# Patient Record
Sex: FEMALE | Race: WHITE | NOT HISPANIC OR LATINO | Employment: FULL TIME | URBAN - METROPOLITAN AREA
[De-identification: names, ages, dates, MRNs, and addresses within clinical notes are randomized per-mention and may not be internally consistent; named-entity substitution may affect disease eponyms.]

---

## 2017-02-08 ENCOUNTER — ALLSCRIPTS OFFICE VISIT (OUTPATIENT)
Dept: OTHER | Facility: OTHER | Age: 47
End: 2017-02-08

## 2017-02-22 ENCOUNTER — ALLSCRIPTS OFFICE VISIT (OUTPATIENT)
Dept: OTHER | Facility: OTHER | Age: 47
End: 2017-02-22

## 2017-03-08 ENCOUNTER — ALLSCRIPTS OFFICE VISIT (OUTPATIENT)
Dept: OTHER | Facility: OTHER | Age: 47
End: 2017-03-08

## 2017-03-24 ENCOUNTER — TRANSCRIBE ORDERS (OUTPATIENT)
Dept: ADMINISTRATIVE | Facility: HOSPITAL | Age: 47
End: 2017-03-24

## 2017-03-24 DIAGNOSIS — Z12.31 SCREENING MAMMOGRAM FOR HIGH-RISK PATIENT: Primary | ICD-10-CM

## 2017-03-30 ENCOUNTER — HOSPITAL ENCOUNTER (OUTPATIENT)
Dept: RADIOLOGY | Facility: HOSPITAL | Age: 47
Discharge: HOME | End: 2017-03-30
Attending: OBSTETRICS & GYNECOLOGY
Payer: COMMERCIAL

## 2017-03-30 DIAGNOSIS — Z12.31 SCREENING MAMMOGRAM FOR HIGH-RISK PATIENT: ICD-10-CM

## 2017-03-30 PROCEDURE — G0202 SCR MAMMO BI INCL CAD: HCPCS

## 2017-04-10 ENCOUNTER — ALLSCRIPTS OFFICE VISIT (OUTPATIENT)
Dept: OTHER | Facility: OTHER | Age: 47
End: 2017-04-10

## 2017-05-23 ENCOUNTER — ALLSCRIPTS OFFICE VISIT (OUTPATIENT)
Dept: OTHER | Facility: OTHER | Age: 47
End: 2017-05-23

## 2017-06-28 ENCOUNTER — ALLSCRIPTS OFFICE VISIT (OUTPATIENT)
Dept: OTHER | Facility: OTHER | Age: 47
End: 2017-06-28

## 2017-10-05 ENCOUNTER — ALLSCRIPTS OFFICE VISIT (OUTPATIENT)
Dept: OTHER | Facility: OTHER | Age: 47
End: 2017-10-05

## 2017-10-27 NOTE — PROGRESS NOTES
Assessment  1  BMI 26 0-26 9,adult (V85 22) (Z68 26)   2  Obesity (278 00) (E66 9)    Discussion/Summary  The patient was counseled regarding risk factor reductions,-- impressions,-- risks and benefits of treatment options  Chief Complaint  pt present to follow up on medications  af/rma      History of Present Illness  10d vacationa lot of beer, ate a lot toobeen weighing selfgain later noted after vacationon TLCstopped fastin for over 1m prior to the ioakyrfy4d [place] on itto have controlled wt while off for the time not takennot gain but did not lose       Review of Systems    Cardiovascular: no chest pain,-- no palpitations-- and-- no lower extremity edema  Respiratory: no orthopnea-- and-- no shortness of breath during exertion  Psychiatric: no anxiety  Active Problems  1  BMI 26 0-26 9,adult (V85 22) (Z68 26)   2  Hearing loss (389 9) (H91 90)   3  Holosystolic murmur (869 9) (A14 3)   4  Immunization due (V05 9) (Z23)   5  Obesity (278 00) (E66 9)   6  S/P hysterectomy with oophorectomy (V88 01) (Z90 710,Z90 721)   7  Screening breast examination (V76 10) (Z12 31)   8  Screening for cardiovascular, respiratory, and genitourinary diseases   (V81 2,V81 4,V81 6) (Z13 6,Z13 83,Z13 89)   9  Screening for diabetes mellitus (DM) (V77 1) (Z13 1)   10  Well adult on routine health check (V70 0) (Z00 00)    Past Medical History  1  History of Acute maxillary sinusitis (461 0) (J01 00)   2  History of acute pharyngitis (V12 69) (Z87 09)   3  History of acute sinusitis (V12 69) (Z87 09)   4  History of Stye In Right Upper Eyelid (373 11)    Surgical History  1  History of Appendectomy   2  History of Hysteroscopy With Endometrial Ablation   3  History of Tubal Ligation    Family History  Mother    1  Family history of Breast CA   2  Family history of Motor neuron disease  Father    3  Family history of Cancer of neck  Family History    4   Family history of [place]'s disease    The family history was reviewed and updated today  Social History   · Former smoker (P47 98) (F30 775)  The social history was reviewed and updated today  Current Meds   1  Aspirin 81 MG Oral Tablet Chewable; CHEW AND SWALLOW 1 TABLET DAILY Recorded   2  Calcium Citrate + TABS; 1 tablet daily; Therapy: (Alex Stanley) to Recorded   3  Estradiol 2 MG Oral Tablet; TAKE 1 TABLET DAILY AS DIRECTED; Therapy: 82SHI8147 to (Evaluate:20Myn1009); Last Rx:02Wuw1213 Ordered   4  Magnesium 500 MG Oral Capsule; Take as directed Recorded   5  Phentermine HCl - 37 5 MG Oral Tablet; 1 Every Day in am;   Therapy: 37BXV5132 to (Last Rx:80Jgz8063) Ordered   6  Vitamin D3 5000 UNIT Oral Tablet; Take 1 tablet daily Recorded    Allergies  1  No Known Drug Allergies    Vitals  Vital Signs    Recorded: 60ZPD1140 09:02AM   Temperature 97 6 F   Heart Rate 76   Respiration 16   Systolic 889   Diastolic 76   Height 5 ft 6 in   Weight 164 lb 8 oz   BMI Calculated 26 55   BSA Calculated 1 84     Physical Exam    Constitutional   General appearance: No acute distress, well appearing and well nourished  Eyes   Conjunctiva and lids: No swelling, erythema or discharge  Pupils and irises: Equal, round and reactive to light  Ears, Nose, Mouth, and Throat   External inspection of ears and nose: Normal     Otoscopic examination: Tympanic membranes translucent with normal light reflex  Canals patent without erythema  Nasal mucosa, septum, and turbinates: Normal without edema or erythema  Oropharynx: Normal with no erythema, edema, exudate or lesions  Pulmonary   Respiratory effort: No increased work of breathing or signs of respiratory distress  Auscultation of lungs: Clear to auscultation  Cardiovascular   Auscultation of heart: Normal rate and rhythm, normal S1 and S2, without murmurs  Examination of extremities for edema and/or varicosities: Normal     Carotid pulses: Normal     Abdomen   Abdomen: Non-tender, no masses  Lymphatic   Palpation of lymph nodes in neck: No lymphadenopathy  Musculoskeletal   Gait and station: Normal     Digits and nails: Normal without clubbing or cyanosis  Skin   Skin and subcutaneous tissue: Normal without rashes or lesions  Psychiatric   Mood and affect: Normal          Results/Data  PHQ-2 Adult Depression Screening 93TSM2591 06:17PM Jesse De Paz     Test Name Result Flag Reference   PHQ-2 Adult Depression Score 0     Over the last two weeks, how often have you been bothered by any of the following problems?   Little interest or pleasure in doing things: Not at all - 0  Feeling down, depressed, or hopeless: Not at all - 0   PHQ-2 Adult Depression Screening Negative         Provider Comments    cont fastincont TLCshow wt loss during txwean in futureto lose another 8-10# as goal      Future Appointments    Date/Time Provider Specialty Site   11/06/2017 03:15 PM Jesse De Paz, [address] [place] [address]     Signatures   Electronically signed by : Suraj Holt DO; Oct  5 2017  6:31PM EST                       (Author)

## 2018-01-12 VITALS
TEMPERATURE: 98.2 F | BODY MASS INDEX: 27.54 KG/M2 | HEIGHT: 66 IN | RESPIRATION RATE: 22 BRPM | WEIGHT: 171.38 LBS | HEART RATE: 86 BPM | DIASTOLIC BLOOD PRESSURE: 76 MMHG | SYSTOLIC BLOOD PRESSURE: 124 MMHG

## 2018-01-12 VITALS
TEMPERATURE: 97.2 F | DIASTOLIC BLOOD PRESSURE: 80 MMHG | HEIGHT: 66 IN | HEART RATE: 100 BPM | BODY MASS INDEX: 31.02 KG/M2 | RESPIRATION RATE: 16 BRPM | WEIGHT: 193 LBS | SYSTOLIC BLOOD PRESSURE: 122 MMHG

## 2018-01-13 VITALS
HEIGHT: 66 IN | HEART RATE: 76 BPM | TEMPERATURE: 98.5 F | SYSTOLIC BLOOD PRESSURE: 128 MMHG | DIASTOLIC BLOOD PRESSURE: 86 MMHG | WEIGHT: 177 LBS | BODY MASS INDEX: 28.45 KG/M2 | RESPIRATION RATE: 20 BRPM

## 2018-01-13 VITALS
HEART RATE: 76 BPM | HEIGHT: 66 IN | WEIGHT: 164.5 LBS | RESPIRATION RATE: 16 BRPM | TEMPERATURE: 97.6 F | SYSTOLIC BLOOD PRESSURE: 104 MMHG | BODY MASS INDEX: 26.44 KG/M2 | DIASTOLIC BLOOD PRESSURE: 76 MMHG

## 2018-01-14 VITALS
TEMPERATURE: 98.2 F | HEART RATE: 70 BPM | RESPIRATION RATE: 18 BRPM | DIASTOLIC BLOOD PRESSURE: 84 MMHG | BODY MASS INDEX: 30.05 KG/M2 | WEIGHT: 187 LBS | HEIGHT: 66 IN | SYSTOLIC BLOOD PRESSURE: 148 MMHG

## 2018-01-14 NOTE — PROGRESS NOTES
Assessment    1  BMI 30 0-30 9,adult (V85 30) (Z68 30)   2  Obesity, Class I, BMI 30-34 9 (278 00) (E66 9)   3  S/P hysterectomy with oophorectomy (V88 01) (Z90 710,Z90 721)   4  Holosystolic murmur (190 9) (B21 3)    Plan  BMI 30 0-30 9,adult    · Phentermine HCl - 37 5 MG Oral Tablet; 1 Every Day in am  Obesity, Class I, BMI 30-34 9    · Begin a limited exercise program ; Status:Complete;   Done: 28VZA1486   · Eat a low fat and low cholesterol diet ; Status:Complete;   Done: 70YQY7710   · Shared Decision Making Aid given; Status:Complete;   Done: 25HEF5742   · Some eating tips that can help you lose weight ; Status:Complete;   Done: 76KNI6855   · We recommend that you bring your body mass index down to 26 ; Status:Complete;    Done: 26KXL8554  S/P hysterectomy with oophorectomy    · Estradiol 2 MG Oral Tablet; TAKE 1 TABLET DAILY AS DIRECTED    Discussion/Summary  The patient was counseled regarding risk factor reductions, impressions, risks and benefits of treatment options  Chief Complaint  pt present for a follow up on medications  hg      History of Present Illness  Denies palpitations, chest pain, mood changes, irritability, WILHELM, edema or syncope  Use as an adjunct to diet/exercise program aware  Accepts risks of medication  continues with diet  exercises regularly  no significant valvulopathy on past echo  no anxiety  happy with progress  bp improved       Review of Systems    Constitutional: not feeling poorly and not feeling tired  Cardiovascular: no chest pain and no lower extremity edema  Respiratory: no orthopnea and no shortness of breath during exertion  Active Problems    1  BMI 30 0-30 9,adult (V85 30) (Z68 30)   2  Breast cancer screening, high risk patient (V76 11) (Z12 39)   3  Hearing loss (389 9) (H91 90)   4  Holosystolic murmur (813 8) (T28 2)   5  Immunization due (V05 9) (Z23)   6  Obesity, Class I, BMI 30-34 9 (278 00) (E66 9)   7   S/P hysterectomy with oophorectomy (V88 01) (Z90 710,Z90 721)   8  Screening for cardiovascular, respiratory, and genitourinary diseases   (V81 2,V81 4,V81 6) (Z13 6,Z13 83,Z13 89)   9  Well adult on routine health check (V70 0) (Z00 00)    Past Medical History    1  History of Acute maxillary sinusitis (461 0) (J01 00)   2  History of acute pharyngitis (V12 69) (Z87 09)   3  History of acute sinusitis (V12 69) (Z87 09)   4  History of Stye In Right Upper Eyelid (373 11)    Surgical History    1  History of Appendectomy   2  History of Hysteroscopy With Endometrial Ablation   3  History of Tubal Ligation    Family History  Mother    1  Family history of Breast CA   2  Family history of Motor neuron disease  Father    3  Family history of Cancer of neck  Family History    4  Family history of [place]'s disease    The family history was reviewed and updated today  Social History    · Former smoker (U57 27) (S13 218)  The social history was reviewed and updated today  Current Meds   1  Aspirin 81 MG Oral Tablet Chewable; CHEW AND SWALLOW 1 TABLET DAILY Recorded   2  Calcium Citrate + TABS; Take as directed Recorded   3  Estradiol 2 MG Oral Tablet; TAKE 1 TABLET DAILY AS DIRECTED; Therapy: 92XNY6871 to (Evaluate:17Nov2016); Last Rx:16Nov2016 Ordered   4  Magnesium 500 MG Oral Capsule; Take as directed Recorded   5  Phentermine HCl - 37 5 MG Oral Tablet; 1 Every Day in am;   Therapy: 97SGB5769 to (Evaluate:69Wrh2984); Last Rx:89Joy1881 Ordered   6  Vitamin D3 5000 UNIT Oral Tablet; Take 1 tablet daily Recorded    Allergies    1  No Known Drug Allergies    Vitals  Vital Signs    Recorded: 74BKK0304 04:26PM Recorded: 06DOE4945 04:06PM   Temperature  98 2 F   Heart Rate  72   Respiration  16   Systolic 604 402   Diastolic 80 82   Height  5 ft 6 in   Weight  182 lb    BMI Calculated  29 38   BSA Calculated  1 92     Physical Exam    Constitutional   General appearance: No acute distress, well appearing and well nourished      Eyes   Conjunctiva and lids: No swelling, erythema or discharge  Pupils and irises: Equal, round and reactive to light  Ears, Nose, Mouth, and Throat   External inspection of ears and nose: Normal     Otoscopic examination: Tympanic membranes translucent with normal light reflex  Canals patent without erythema  Nasal mucosa, septum, and turbinates: Normal without edema or erythema  Oropharynx: Normal with no erythema, edema, exudate or lesions  Pulmonary   Respiratory effort: No increased work of breathing or signs of respiratory distress  Auscultation of lungs: Clear to auscultation  Cardiovascular   Auscultation of heart: Normal rate and rhythm, normal S1 and S2, without murmurs  Examination of extremities for edema and/or varicosities: Normal     Carotid pulses: Normal     Abdomen   Abdomen: Non-tender, no masses  Lymphatic   Palpation of lymph nodes in neck: No lymphadenopathy  Musculoskeletal   Gait and station: Normal     Digits and nails: Normal without clubbing or cyanosis  Inspection/palpation of joints, bones, and muscles: Normal     Skin   Skin and subcutaneous tissue: Normal without rashes or lesions      Psychiatric   Mood and affect: Normal          Provider Comments    bp improved  recheck monthly  short term plan  watch bp  cc echo given to pt  endocarditis prophylaxis not needed  bmi improving  goals advised  s/p hyster stable        Future Appointments    Date/Time Provider Specialty Site   04/10/2017 04:30 PM [facility] [address]     Signatures   Electronically signed by : Garry Duane, DO; Mar  8 2017  8:14PM EST                       (Author)

## 2018-01-16 NOTE — PROGRESS NOTES
Assessment    1  Well adult on routine health check (V70 0) (Z00 00)   2  S/P hysterectomy with oophorectomy (V88 01) (Z90 710,Z90 721)   3  History of Appendectomy   4  Former smoker (V15 82) (V57 940)   5  Encounter for preventive health examination (V70 0) (Z00 00)   6  Need for vaccination (V05 9) (Z23)   7  BMI 30 0-30 9,adult (V85 30) (Z68 30)   8  Obesity, Class I, BMI 30-34 9 (278 00) (E66 9)   9  Encounter for screening for cardiovascular disorders (V81 2) (Z13 6)    Plan  Encounter for screening for cardiovascular disorders    · (1) COMPREHENSIVE METABOLIC PANEL; Status:Active; Requested for:16Nov2016;    · (1) LIPID PANEL, FASTING; Status:Active; Requested for:16Nov2016;   Need for vaccination    · Fluzone Quadrivalent Intramuscular Suspension; 0 5ml; To Be Done:  03LMN8043  Obesity, Class I, BMI 30-34 9    · Begin a limited exercise program ; Status:Complete;   Done: 14VGG9535 04:01PM   · Begin or continue regular aerobic exercise  Gradually work up to at least 3 sessions of 30  minutes of exercise a week ; Status:Complete;   Done: 67QAB0632 04:01PM   · Eat a low fat and low cholesterol diet ; Status:Complete;   Done: 24YXE8087 04:01PM   · Keep a diary of when and what you eat ; Status:Complete;   Done: 62QOG3833 04:01PM   · Shared Decision Making Aid given; Status:Complete;   Done: 22AZE2028 04:01PM   · Some eating tips that can help you lose weight ; Status:Complete;   Done: 92ENX0386  04:01PM   · Stretch and warm up your muscles during the first 10 minutes , then cool down your  muscles for the last 10 minutes of exercise ; Status:Complete;   Done: 63OWO7708  04:01PM   · There are many exercise options for seniors ; Status:Complete;   Done: 25SDK8115  04:01PM   · We encourage all of our patients to exercise regularly    30 minutes of exercise or physical  activity five or more days a week is recommended for children and adults ;  Status:Complete;   Done: 73BUA5011 04:01PM   · We recommend that you bring your body mass index down to 26 ; Status:Complete;    Done: 24DRM5956 04:01PM   · We recommend you modify your diet to achieve and maintain a healthy weight  Being  overweight may increase your risk for developing health problems such as diabetes,  heart disease, and cancer  Avoid high fat foods and eat a balanced diet rich  in fruits and vegetables  The combination of a reduced-calorie diet and increased  physical activity is recommended    Please let us know if you would like to  learn more about your nutrition and calorie needs, and additional options including  weight loss programs that can help you achieve your goals ; Status:Complete;   Done:  82NOO1479 04:01PM   · We want you to follow the Therapeutic Lifestyle Changes (TLC) diet ; Status:Complete;    Done: 30DRO0247 04:01PM   · Call [phone] if: You are considering suicide ; Status:Complete;   Done:  73III9898 04:01PM   · Call [phone] if: You are having difficulty sleeping (insomnia) ; Status:Complete;    Done: 34VZJ4877 04:01PM   · Call [phone] if: You are urinating too frequently ; Status:Complete;   Done:  59AHI6820 04:01PM   · Call [phone] if: You feel thirsty most of the time ; Status:Complete;   Done:  24KGF4842 04:01PM   · Call [phone] if: You feel your heart is beating very fast or skipping beats ;  Status:Complete;   Done: 42DCK0168 04:01PM   · Call [phone] if: You have feelings of extreme sadness and feelings of  hopelessness ; Status:Complete;   Done: 03DVQ3203 04:01PM   · Call [phone] if: You have pain in your abdomen ; Status:Complete;   Done:  16XVY7366 04:01PM   · Call [phone] if: You have symptoms of sleep apnea ; Status:Complete;   Done:  67QTE0196 04:01PM   · Call 911 if: You have sudden or severe chest pain with shortness of breath, rapid  breathing, or cough ; Status:Complete;   Done: 41PII7815 04:01PM   · Seek Immediate Medical Attention if: You experience a new kind of chest pain (angina)  or pressure ; Status:Complete;   Done: 51RNQ4855 04:01PM  S/P hysterectomy with oophorectomy    · Estradiol 2 MG Oral Tablet; TAKE 1 TABLET DAILY AS DIRECTED    Discussion/Summary  health maintenance visit cervical cancer screening is managed by obgyn Breast cancer screening: breast cancer screening is managed by obgyn  Chief Complaint  pt present for CPE  ac/cma      History of Present Illness  HM, Adult Female: The patient is being seen for a health maintenance evaluation  General Health:   Screening:   HPI: pt is here for a full physical    pt would like her flu shot      Review of Systems    Constitutional: No fever, no chills, feels well, no tiredness, no recent weight gain or weight loss  Eyes: No complaints of eye pain, no red eyes, no eyesight problems, no discharge, no dry eyes, no itching of eyes  ENT: no complaints of earache, no loss of hearing, no nose bleeds, no nasal discharge, no sore throat, no hoarseness  Cardiovascular: No complaints of slow heart rate, no fast heart rate, no chest pain, no palpitations, no leg claudication, no lower extremity edema  Respiratory: No complaints of shortness of breath, no wheezing, no cough, no SOB on exertion, no orthopnea, no PND  Gastrointestinal: No complaints of abdominal pain, no constipation, no nausea or vomiting, no diarrhea, no bloody stools  Genitourinary: No complaints of dysuria, no incontinence, no pelvic pain, no dysmenorrhea, no vaginal discharge or bleeding  Musculoskeletal: No complaints of arthralgias, no myalgias, no joint swelling or stiffness, no limb pain or swelling  Integumentary: No complaints of skin rash or lesions, no itching, no skin wounds, no breast pain or lump  Neurological: No complaints of headache, no confusion, no convulsions, no numbness, no dizziness or fainting, no tingling, no limb weakness, no difficulty walking     Psychiatric: Not suicidal, no sleep disturbance, no anxiety or depression, no change in personality, no emotional problems  Endocrine: No complaints of proptosis, no hot flashes, no muscle weakness, no deepening of the voice, no feelings of weakness  Hematologic/Lymphatic: No complaints of swollen glands, no swollen glands in the neck, does not bleed easily, does not bruise easily  Active Problems    1  Acute maxillary sinusitis (461 0) (J01 00)   2  Encounter for screening mammogram for malignant neoplasm of breast (V76 12)   (Z12 31)   3  Hearing Loss (389 9)   4  Holosystolic murmur (510 1) (K56 9)   5  Well adult on routine health check (V70 0) (Z00 00)    Past Medical History    · History of acute pharyngitis (V12 69) (Z87 09)   · History of acute sinusitis (V12 69) (Z87 09)   · History of Stye In Right Upper Eyelid (373 11)    Surgical History    · History of Appendectomy   · History of Hysteroscopy With Endometrial Ablation   · History of Tubal Ligation    Family History  Mother    · Family history of Breast CA   · Family history of Motor neuron disease  Father    · Family history of Cancer of neck  Family History    · Family history of [place]'s disease    Social History    · Former smoker (L05 16) (Y22 548)    Current Meds   1  Aspirin 81 MG Oral Tablet Chewable; CHEW AND SWALLOW 1 TABLET DAILY   Recorded   2  Calcium Citrate + TABS; Take as directed Recorded   3  Magnesium 500 MG Oral Capsule; Take as directed Recorded   4  Vitamin D3 5000 UNIT Oral Tablet; Take 1 tablet daily Recorded    Allergies    1  No Known Drug Allergies    Vitals   Recorded: 27FVQ5779 54:55GK   Systolic 606   Diastolic 78   Heart Rate 76   Respiration 16   Temperature 97 4 F   Height 5 ft 6 in   Weight 189 lb    BMI Calculated 30 51   BSA Calculated 1 96     Physical Exam    Constitutional   General appearance: No acute distress, well appearing and well nourished  Head and Face   Head and face: Normal     Palpation of the face and sinuses: No sinus tenderness      Eyes Conjunctiva and lids: No swelling, erythema or discharge  Pupils and irises: Equal, round, reactive to light  Ears, Nose, Mouth, and Throat   External inspection of ears and nose: Normal     Otoscopic examination: Tympanic membranes translucent with normal light reflex  Canals patent without erythema  Hearing: Normal     Nasal mucosa, septum, and turbinates: Normal without edema or erythema  Lips, teeth, and gums: Normal, good dentition  Neck   Neck: Supple, symmetric, trachea midline, no masses  Thyroid: Normal, no thyromegaly  Pulmonary   Respiratory effort: No increased work of breathing or signs of respiratory distress  Auscultation of lungs: Clear to auscultation  Cardiovascular   Auscultation of heart: Normal rate and rhythm, normal S1 and S2, no murmurs  Abdomen   Abdomen: Non-tender, no masses  Liver and spleen: No hepatomegaly or splenomegaly  Lymphatic   Palpation of lymph nodes in neck: No lymphadenopathy  Palpation of lymph nodes in axillae: No lymphadenopathy  Palpation of lymph nodes in groin: No lymphadenopathy  Palpation of lymph nodes in other areas: No lymphadenopathy  Musculoskeletal   Gait and station: Normal     Digits and nails: Normal without clubbing or cyanosis  Skin   Skin and subcutaneous tissue: Normal without rashes or lesions  Neurologic   Cranial nerves: Cranial nerves II-XII intact  Results/Data  PHQ-2 Adult Depression Screening 30ABO6937 03:36PM User, s     Test Name Result Flag Reference   PHQ-2 Adult Depression Score 0     Over the last two weeks, how often have you been bothered by any of the following problems? Little interest or pleasure in doing things: Not at all - 0  Feeling down, depressed, or hopeless: Not at all - 0   PHQ-2 Adult Depression Screening Negative         Procedure    Procedure: Visual Acuity Test    Indication: routine screening  Inforrmation supplied by a Snellen chart     Results: 20/20 in both eyes with corrective device, 20/20 in the right eye with corrective device, 20/20 in the left eye with corrective device      Signatures   Electronically signed by : Ness Salcedo DO; Nov 16 2016  4:05PM EST                       (Author)

## 2018-01-16 NOTE — RESULT NOTES
Verified Results  (1) COMPREHENSIVE METABOLIC PANEL [id number] 09:32SL GetSet Order Number: [id number]     Test Name Result Flag Reference   GLUCOSE,RANDM 92 mg/dL     If the patient is fasting, the ADA then defines impaired fasting glucose as > 100 mg/dL and diabetes as > or equal to 123 mg/dL  SODIUM 144 mmol/L  136-145   POTASSIUM 4 4 mmol/L  3 5-5 3   CHLORIDE 106 mmol/L  100-108   CARBON DIOXIDE 29 mmol/L  21-32   ANION GAP (CALC) 9 mmol/L  4-13   BLOOD UREA NITROGEN 16 mg/dL  5-25   CREATININE 0 78 mg/dL  0 60-1 30   Standardized to IDMS reference method   CALCIUM 9 5 mg/dL  8 3-10 1   BILI, TOTAL 0 40 mg/dL  0 20-1 00   ALK PHOSPHATAS 75 U/L     ALT (SGPT) 52 U/L  12-78   AST(SGOT) 29 U/L  5-45   ALBUMIN 3 7 g/dL  3 5-5 0   TOTAL PROTEIN 7 1 g/dL  6 4-8 2   eGFR Non-African American      >60 0 ml/min/1 73sq m   - Patient Instructions: This is a fasting blood test  Water,black tea or black  coffee only after 9:00pm the night before test Drink 2 glasses of water the morning of test   National Kidney Disease Education Program recommendations are as follows:  GFR calculation is accurate only with a steady state creatinine  Chronic Kidney disease less than 60 ml/min/1 73 sq  meters  Kidney failure less than 15 ml/min/1 73 sq  meters  (1) LIPID PANEL, FASTING [id number] 07:37AM GetSet Order Number: [id number]     Test Name Result Flag Reference   CHOLESTEROL 212 mg/dL H    HDL,DIRECT 60 mg/dL  40-60   Specimen collection should occur prior to Metamizole administration due to the potential for falsely depressed results  LDL CHOLESTEROL CALCULATED 124 mg/dL H 0-100   - Patient Instructions: This is a fasting blood test  Water,black tea or black  coffee only after 9:00pm the night before test   Drink 2 glasses of water the morning of test     - Patient Instructions:  This is a fasting blood test  Water,black tea or black  coffee only after 9:00pm the night before test Drink 2 glasses of water the morning of test   Triglyceride:         Normal              <150 mg/dl       Borderline High    150-199 mg/dl       High               200-499 mg/dl       Very High          >499 mg/dl  Cholesterol:         Desirable        <200 mg/dl      Borderline High  200-239 mg/dl      High             >239 mg/dl  HDL Cholesterol:        High    >59 mg/dL      Low     <41 mg/dL  LDL CALCULATED:    This screening LDL is a calculated result  It does not have the accuracy of the Direct Measured LDL in the monitoring of patients with hyperlipidemia and/or statin therapy  Direct Measure LDL (CKO517) must be ordered separately in these patients  TRIGLYCERIDES 142 mg/dL  <=150   Specimen collection should occur prior to N-Acetylcysteine or Metamizole administration due to the potential for falsely depressed results  CHOLESTEROL 212 mg/dL H    HDL,DIRECT 60 mg/dL  40-60   Specimen collection should occur prior to Metamizole administration due to the potential for falsely depressed results  LDL CHOLESTEROL CALCULATED 124 mg/dL H 0-100   - Patient Instructions: This is a fasting blood test  Water,black tea or black  coffee only after 9:00pm the night before test   Drink 2 glasses of water the morning of test     - Patient Instructions: This is a fasting blood test  Water,black tea or black  coffee only after 9:00pm the night before test Drink 2 glasses of water the morning of test   Triglyceride:         Normal              <150 mg/dl       Borderline High    150-199 mg/dl       High               200-499 mg/dl       Very High          >499 mg/dl  Cholesterol:         Desirable        <200 mg/dl      Borderline High  200-239 mg/dl      High             >239 mg/dl  HDL Cholesterol:        High    >59 mg/dL      Low     <41 mg/dL  LDL CALCULATED:    This screening LDL is a calculated result    It does not have the accuracy of the Direct Measured LDL in the monitoring of patients with hyperlipidemia and/or statin therapy  Direct Measure LDL (QNQ359) must be ordered separately in these patients  TRIGLYCERIDES 142 mg/dL  <=150   Specimen collection should occur prior to N-Acetylcysteine or Metamizole administration due to the potential for falsely depressed results  Discussion/Summary   Sugar, kidneys, minerals and liver are normal       Cholesterol profile is normal/ok  Please contact Dr Rausch Said if you have any questions

## 2018-01-22 VITALS
RESPIRATION RATE: 16 BRPM | WEIGHT: 182 LBS | BODY MASS INDEX: 29.25 KG/M2 | DIASTOLIC BLOOD PRESSURE: 80 MMHG | SYSTOLIC BLOOD PRESSURE: 118 MMHG | HEIGHT: 66 IN | HEART RATE: 72 BPM | TEMPERATURE: 98.2 F

## 2018-03-16 ENCOUNTER — TRANSCRIBE ORDERS (OUTPATIENT)
Dept: ADMINISTRATIVE | Facility: HOSPITAL | Age: 48
End: 2018-03-16

## 2018-03-16 DIAGNOSIS — Z12.39 BREAST SCREENING: Primary | ICD-10-CM

## 2018-04-05 ENCOUNTER — HOSPITAL ENCOUNTER (OUTPATIENT)
Dept: RADIOLOGY | Facility: HOSPITAL | Age: 48
Discharge: HOME | End: 2018-04-05
Attending: OBSTETRICS & GYNECOLOGY
Payer: COMMERCIAL

## 2018-04-05 DIAGNOSIS — Z12.39 BREAST SCREENING: ICD-10-CM

## 2018-04-05 PROCEDURE — 77067 SCR MAMMO BI INCL CAD: CPT

## 2018-10-09 ENCOUNTER — APPOINTMENT (EMERGENCY)
Dept: RADIOLOGY | Facility: HOSPITAL | Age: 48
End: 2018-10-09
Payer: COMMERCIAL

## 2018-10-09 ENCOUNTER — HOSPITAL ENCOUNTER (EMERGENCY)
Facility: HOSPITAL | Age: 48
Discharge: HOME | End: 2018-10-09
Attending: EMERGENCY MEDICINE | Admitting: EMERGENCY MEDICINE
Payer: COMMERCIAL

## 2018-10-09 VITALS
OXYGEN SATURATION: 98 % | HEIGHT: 67 IN | WEIGHT: 175 LBS | RESPIRATION RATE: 15 BRPM | TEMPERATURE: 96.8 F | HEART RATE: 56 BPM | SYSTOLIC BLOOD PRESSURE: 147 MMHG | BODY MASS INDEX: 27.47 KG/M2 | DIASTOLIC BLOOD PRESSURE: 70 MMHG

## 2018-10-09 DIAGNOSIS — R42 DIZZINESS: Primary | ICD-10-CM

## 2018-10-09 LAB
ALBUMIN SERPL BCP-MCNC: 3.5 G/DL (ref 3.5–5)
ALP SERPL-CCNC: 64 U/L (ref 46–116)
ALT SERPL W P-5'-P-CCNC: 55 U/L (ref 12–78)
ANION GAP SERPL CALCULATED.3IONS-SCNC: 12 MMOL/L (ref 4–13)
AST SERPL W P-5'-P-CCNC: 36 U/L (ref 5–45)
BASOPHILS # BLD AUTO: 0.04 THOUSANDS/ΜL (ref 0–0.1)
BASOPHILS NFR BLD AUTO: 1 % (ref 0–1)
BILIRUB SERPL-MCNC: 0.5 MG/DL (ref 0.2–1)
BUN SERPL-MCNC: 16 MG/DL (ref 5–25)
CALCIUM SERPL-MCNC: 9.2 MG/DL (ref 8.3–10.1)
CHLORIDE SERPL-SCNC: 105 MMOL/L (ref 100–108)
CO2 SERPL-SCNC: 24 MMOL/L (ref 21–32)
CREAT SERPL-MCNC: 0.73 MG/DL (ref 0.6–1.3)
EOSINOPHIL # BLD AUTO: 0.29 THOUSAND/ΜL (ref 0–0.61)
EOSINOPHIL NFR BLD AUTO: 6 % (ref 0–6)
ERYTHROCYTE [DISTWIDTH] IN BLOOD BY AUTOMATED COUNT: 13.4 % (ref 11.6–15.1)
GFR SERPL CREATININE-BSD FRML MDRD: 98 ML/MIN/1.73SQ M
GLUCOSE SERPL-MCNC: 120 MG/DL (ref 65–140)
HCT VFR BLD AUTO: 39.1 % (ref 34.8–46.1)
HGB BLD-MCNC: 12.8 G/DL (ref 11.5–15.4)
IMM GRANULOCYTES # BLD AUTO: 0.02 THOUSAND/UL (ref 0–0.2)
IMM GRANULOCYTES NFR BLD AUTO: 0 % (ref 0–2)
LYMPHOCYTES # BLD AUTO: 1.49 THOUSANDS/ΜL (ref 0.6–4.47)
LYMPHOCYTES NFR BLD AUTO: 31 % (ref 14–44)
MCH RBC QN AUTO: 29 PG (ref 26.8–34.3)
MCHC RBC AUTO-ENTMCNC: 32.7 G/DL (ref 31.4–37.4)
MCV RBC AUTO: 89 FL (ref 82–98)
MONOCYTES # BLD AUTO: 0.4 THOUSAND/ΜL (ref 0.17–1.22)
MONOCYTES NFR BLD AUTO: 8 % (ref 4–12)
NEUTROPHILS # BLD AUTO: 2.5 THOUSANDS/ΜL (ref 1.85–7.62)
NEUTS SEG NFR BLD AUTO: 54 % (ref 43–75)
NRBC BLD AUTO-RTO: 0 /100 WBCS
PLATELET # BLD AUTO: 167 THOUSANDS/UL (ref 149–390)
PMV BLD AUTO: 11.4 FL (ref 8.9–12.7)
POTASSIUM SERPL-SCNC: 4.4 MMOL/L (ref 3.5–5.3)
PROT SERPL-MCNC: 7.1 G/DL (ref 6.4–8.2)
RBC # BLD AUTO: 4.41 MILLION/UL (ref 3.81–5.12)
SODIUM SERPL-SCNC: 141 MMOL/L (ref 136–145)
T4 FREE SERPL-MCNC: 0.94 NG/DL (ref 0.76–1.46)
TROPONIN I SERPL-MCNC: <0.02 NG/ML
TSH SERPL DL<=0.05 MIU/L-ACNC: 3.83 UIU/ML (ref 0.36–3.74)
WBC # BLD AUTO: 4.74 THOUSAND/UL (ref 4.31–10.16)

## 2018-10-09 PROCEDURE — 84484 ASSAY OF TROPONIN QUANT: CPT | Performed by: EMERGENCY MEDICINE

## 2018-10-09 PROCEDURE — 70498 CT ANGIOGRAPHY NECK: CPT

## 2018-10-09 PROCEDURE — 36415 COLL VENOUS BLD VENIPUNCTURE: CPT | Performed by: EMERGENCY MEDICINE

## 2018-10-09 PROCEDURE — 84439 ASSAY OF FREE THYROXINE: CPT | Performed by: EMERGENCY MEDICINE

## 2018-10-09 PROCEDURE — 96361 HYDRATE IV INFUSION ADD-ON: CPT

## 2018-10-09 PROCEDURE — 85025 COMPLETE CBC W/AUTO DIFF WBC: CPT | Performed by: EMERGENCY MEDICINE

## 2018-10-09 PROCEDURE — 80053 COMPREHEN METABOLIC PANEL: CPT | Performed by: EMERGENCY MEDICINE

## 2018-10-09 PROCEDURE — 93005 ELECTROCARDIOGRAM TRACING: CPT

## 2018-10-09 PROCEDURE — 99284 EMERGENCY DEPT VISIT MOD MDM: CPT

## 2018-10-09 PROCEDURE — 96374 THER/PROPH/DIAG INJ IV PUSH: CPT

## 2018-10-09 PROCEDURE — 70496 CT ANGIOGRAPHY HEAD: CPT

## 2018-10-09 PROCEDURE — 84443 ASSAY THYROID STIM HORMONE: CPT | Performed by: EMERGENCY MEDICINE

## 2018-10-09 RX ORDER — ESTRADIOL 2 MG/1
1 TABLET ORAL DAILY
COMMUNITY
Start: 2016-11-16 | End: 2022-07-14

## 2018-10-09 RX ORDER — LANOLIN ALCOHOL/MO/W.PET/CERES
1 CREAM (GRAM) TOPICAL DAILY
COMMUNITY
End: 2022-04-25

## 2018-10-09 RX ORDER — ONDANSETRON 2 MG/ML
4 INJECTION INTRAMUSCULAR; INTRAVENOUS ONCE
Status: COMPLETED | OUTPATIENT
Start: 2018-10-09 | End: 2018-10-09

## 2018-10-09 RX ORDER — ASPIRIN 81 MG/1
1 TABLET, CHEWABLE ORAL DAILY
COMMUNITY

## 2018-10-09 RX ORDER — FOLIC ACID 0.8 MG
TABLET ORAL DAILY
COMMUNITY
End: 2022-03-24 | Stop reason: ALTCHOICE

## 2018-10-09 RX ADMIN — SODIUM CHLORIDE 1000 ML: 0.9 INJECTION, SOLUTION INTRAVENOUS at 05:57

## 2018-10-09 RX ADMIN — ONDANSETRON 4 MG: 2 INJECTION INTRAMUSCULAR; INTRAVENOUS at 05:58

## 2018-10-09 RX ADMIN — IOHEXOL 85 ML: 350 INJECTION, SOLUTION INTRAVENOUS at 06:40

## 2018-10-09 NOTE — ED CARE HANDOFF
Emergency Department Sign Out Note        Sign out and transfer of care from Dr Arabella Almonte  See Separate Emergency Department note  The patient, Christine Ivory, was evaluated by the previous provider for Dizziness  Workup Completed:  CTA head and neck in addition to lab work were completed and results discussed with patient  ED Course / Workup Pending (followup): Patient was feeling better on re-exam and dizziness was resolving  Will give outpatient follow up as ER workup was negative for acute pathology  Stroke Assessment     Row Name 10/09/18 0605             NIH Stroke Scale    Interval Baseline      Level of Consciousness (1a ) 0      LOC Questions (1b ) 0      LOC Commands (1c ) 0      Best Gaze (2 ) 0      Visual (3 ) 0      Facial Palsy (4 ) 0      Motor Arm, Left (5a ) 0      Motor Arm, Right (5b ) 0      Motor Leg, Left (6a ) 0      Motor Leg, Right (6b ) 0      Limb Ataxia (7 ) 0      Sensory (8 ) 0      Best Language (9 ) 0      Dysarthria (10 ) 0      Extinction and Inattention (11 ) (Formerly Neglect) 0      Total 0                           Procedures  MDM  CritCare Time      Disposition  Final diagnoses:   Dizziness     Time reflects when diagnosis was documented in both MDM as applicable and the Disposition within this note     Time User Action Codes Description Comment    10/9/2018  7:43 AM Burton Shields Menasha Add [R42] Dizziness       ED Disposition     ED Disposition Condition Comment    Discharge  Christine Ivory discharge to home/self care      Condition at discharge: stable        Follow-up Information     Follow up With Specialties Details Why Contact Info Additional Information    Isabela Robles MD Neurology In 1 week  [address] [address] [address] [facility] Emergency Department Emergency Medicine  If symptoms worsen [address] [facility], [place], [place], [address]        Patient's Medications   Discharge Prescriptions    No medications on file     No discharge procedures on file         ED Provider  Electronically Signed by     Ana M Cerrato DO  10/09/18 7412

## 2018-10-09 NOTE — ED PROVIDER NOTES
History  Chief Complaint   Patient presents with    Dizziness     Pt c/o dizziness when soke up this morning  79-year-old white female presents with complaints of feeling lightheaded and dizzy  Patient states she has been having symptoms on off for the last couple days but significantly worse this morning when she woke up  Patient has remote history of head trauma approximately 2 weeks ago  At that time she had no loss of consciousness, no nausea, no vomiting, no change in vision  Patient's symptoms worse when she looks to the left  No new medicines, no recent illness, denies true vertigo  Patient states she feels off balance and falling to the side  No focal deficits  No slurred speech        History provided by:  Patient  Dizziness   Quality:  Lightheadedness and imbalance  Severity:  Moderate  Onset quality:  Unable to specify  Duration:  3 days  Timing:  Intermittent  Progression:  Worsening  Chronicity:  New  Context: head movement    Context: not when bending over and not with medication    Relieved by:  Nothing  Worsened by:  Turning head  Ineffective treatments:  Closing eyes and lying down  Associated symptoms: chest pain and nausea    Associated symptoms: no blood in stool, no diarrhea, no palpitations, no shortness of breath, no syncope, no tinnitus, no vision changes and no vomiting    Risk factors: no anemia, no heart disease, no hx of vertigo and no new medications        Prior to Admission Medications   Prescriptions Last Dose Informant Patient Reported? Taking?    Magnesium 500 MG CAPS   Yes Yes   Sig: Take by mouth   aspirin 81 mg chewable tablet   Yes Yes   Sig: Chew 1 tablet daily   calcium citrate-vitamin D (CITRACAL+D) 315-200 MG-UNIT per tablet   Yes Yes   Sig: Take 1 tablet by mouth daily   cholecalciferol (VITAMIN D3) 1,000 units tablet   Yes Yes   Sig: Take 1 tablet by mouth daily   estradiol (ESTRACE) 2 MG tablet   Yes Yes   Sig: Take 1 tablet by mouth daily Facility-Administered Medications: None       History reviewed  No pertinent past medical history  Past Surgical History:   Procedure Laterality Date    HYSTERECTOMY         History reviewed  No pertinent family history  I have reviewed and agree with the history as documented  Social History   Substance Use Topics    Smoking status: Never Smoker    Smokeless tobacco: Never Used    Alcohol use Yes        Review of Systems   Constitutional: Negative  Negative for chills and fever  HENT: Negative  Negative for tinnitus  Respiratory: Negative for shortness of breath  Cardiovascular: Positive for chest pain  Negative for palpitations and syncope  Gastrointestinal: Positive for nausea  Negative for blood in stool, diarrhea and vomiting  Genitourinary: Negative  Musculoskeletal: Negative  Skin: Negative  Neurological: Positive for dizziness  Hematological: Negative  Psychiatric/Behavioral: Negative  All other systems reviewed and are negative  Physical Exam  Physical Exam   Constitutional: She is oriented to person, place, and time  She appears well-developed and well-nourished  HENT:   Head: Normocephalic and atraumatic  Right Ear: External ear normal    Left Ear: External ear normal    Nose: Nose normal    Mouth/Throat: Oropharynx is clear and moist    Eyes: Pupils are equal, round, and reactive to light  Conjunctivae and EOM are normal    Neck: Normal range of motion  Neck supple  No bruits appreciated   Cardiovascular: Regular rhythm, normal heart sounds, intact distal pulses and normal pulses  Bradycardia present  Pulmonary/Chest: Effort normal and breath sounds normal    Abdominal: Soft  Bowel sounds are normal    Musculoskeletal: Normal range of motion  Neurological: She is alert and oriented to person, place, and time  Skin: Skin is warm and dry  Capillary refill takes less than 2 seconds  She is not diaphoretic     Psychiatric: She has a normal mood and affect  Her behavior is normal  Judgment and thought content normal    Nursing note and vitals reviewed        Vital Signs  ED Triage Vitals [10/09/18 0515]   Temperature Pulse Respirations Blood Pressure SpO2   (!) 96 8 °F (36 °C) 60 16 160/74 96 %      Temp Source Heart Rate Source Patient Position - Orthostatic VS BP Location FiO2 (%)   Tympanic Monitor Sitting Right arm --      Pain Score       No Pain           Vitals:    10/09/18 0700 10/09/18 0715 10/09/18 0730 10/09/18 0745   BP:  147/70 147/70    Pulse: 60 60 60 56   Patient Position - Orthostatic VS:  Lying         Visual Acuity      ED Medications  Medications   ondansetron (ZOFRAN) injection 4 mg (4 mg Intravenous Given 10/9/18 0558)   sodium chloride 0 9 % bolus 1,000 mL (0 mL Intravenous Stopped 10/9/18 0801)   iohexol (OMNIPAQUE) 350 MG/ML injection (MULTI-DOSE) 85 mL (85 mL Intravenous Given 10/9/18 0640)       Diagnostic Studies  Results Reviewed     Procedure Component Value Units Date/Time    T4, free [[id number]]  (Normal) Collected:  10/09/18 0521    Lab Status:  Final result Specimen:  Blood from Arm, Right Updated:  10/09/18 1144     Free T4 0 94 ng/dL     Comprehensive metabolic panel [[id number]] Collected:  10/09/18 0521    Lab Status:  Final result Specimen:  Blood from Arm, Right Updated:  10/09/18 8658     Sodium 141 mmol/L      Potassium 4 4 mmol/L      Chloride 105 mmol/L      CO2 24 mmol/L      ANION GAP 12 mmol/L      BUN 16 mg/dL      Creatinine 0 73 mg/dL      Glucose 120 mg/dL      Calcium 9 2 mg/dL      AST 36 U/L      ALT 55 U/L      Alkaline Phosphatase 64 U/L      Total Protein 7 1 g/dL      Albumin 3 5 g/dL      Total Bilirubin 0 50 mg/dL      eGFR 98 ml/min/1 73sq m     Narrative:         National Kidney Disease Education Program recommendations are as follows:  GFR calculation is accurate only with a steady state creatinine  Chronic Kidney disease less than 60 ml/min/1 73 sq  meters  Kidney failure less than 15 ml/min/1 73 sq  meters  TSH [[id number]]  (Abnormal) Collected:  10/09/18 0521    Lab Status:  Final result Specimen:  Blood from Arm, Right Updated:  10/09/18 0604     TSH 3RD GENERATON 3 829 (H) uIU/mL     Narrative:         Patients undergoing fluorescein dye angiography may retain small amounts of fluorescein in the body for 48-72 hours post procedure  Samples containing fluorescein can produce falsely depressed TSH values  If the patient had this procedure,a specimen should be resubmitted post fluorescein clearance  The recommended reference ranges for TSH during pregnancy are as follows:  First trimester 0 1 to 2 5 uIU/mL  Second trimester  0 2 to 3 0 uIU/mL  Third trimester 0 3 to 3 0 uIU/m      Troponin I [[id number]]  (Normal) Collected:  10/09/18 0521    Lab Status:  Final result Specimen:  Blood from Arm, Right Updated:  10/09/18 0553     Troponin I <0 02 ng/mL     CBC and differential [[id number]] Collected:  10/09/18 0521    Lab Status:  Final result Specimen:  Blood from Arm, Right Updated:  10/09/18 0531     WBC 4 74 Thousand/uL      RBC 4 41 Million/uL      Hemoglobin 12 8 g/dL      Hematocrit 39 1 %      MCV 89 fL      MCH 29 0 pg      MCHC 32 7 g/dL      RDW 13 4 %      MPV 11 4 fL      Platelets 580 Thousands/uL      nRBC 0 /100 WBCs      Neutrophils Relative 54 %      Immat GRANS % 0 %      Lymphocytes Relative 31 %      Monocytes Relative 8 %      Eosinophils Relative 6 %      Basophils Relative 1 %      Neutrophils Absolute 2 50 Thousands/µL      Immature Grans Absolute 0 02 Thousand/uL      Lymphocytes Absolute 1 49 Thousands/µL      Monocytes Absolute 0 40 Thousand/µL      Eosinophils Absolute 0 29 Thousand/µL      Basophils Absolute 0 04 Thousands/µL                  CTA head and neck with and without contrast   Final Result by Artur Pedroza DO (10/09 8236)      Normal CT of the brain  Normal CT angiogram of the head and neck  Findings were directly discussed with on Current Date  Workstation performed: [id number]                    Procedures  ECG 12 Lead Documentation  Date/Time: 10/9/2018 5:16 AM  Performed by: Pervis Net  Authorized by: P[employer]is Net     Indications / Diagnosis:  Dizzy  ECG reviewed by me, the ED Provider: yes    Patient location:  ED  Previous ECG:     Previous ECG:  Compared to current    Comparison to cardiac monitor: Yes    Interpretation:     Interpretation: normal    Rate:     ECG rate:  54    ECG rate assessment: bradycardic    Rhythm:     Rhythm: sinus bradycardia    Ectopy:     Ectopy: none    QRS:     QRS axis:  Normal    QRS intervals:  Normal  Conduction:     Conduction: normal    ST segments:     ST segments:  Normal  T waves:     T waves: normal             Phone Contacts  ED Phone Contact    ED Course                   Stroke Assessment     Row Name 10/09/18 0605             NIH Stroke Scale    Interval Baseline      Level of Consciousness (1a ) 0      LOC Questions (1b ) 0      LOC Commands (1c ) 0      Best Gaze (2 ) 0      Visual (3 ) 0      Facial Palsy (4 ) 0      Motor Arm, Left (5a ) 0      Motor Arm, Right (5b ) 0      Motor Leg, Left (6a ) 0      Motor Leg, Right (6b ) 0      Limb Ataxia (7 ) 0      Sensory (8 ) 0      Best Language (9 ) 0      Dysarthria (10 ) 0      Extinction and Inattention (11 ) (Formerly Neglect) 0      Total 0                          MDM  CritCare Time    Disposition  Final diagnoses:   Dizziness     Time reflects when diagnosis was documented in both MDM as applicable and the Disposition within this note     Time User Action Codes Description Comment    10/9/2018  7:43 AM Alejandra Campos Add [R42] Dizziness       ED Disposition     ED Disposition Condition Comment    Discharge  [address] discharge to home/self care      Condition at discharge: stable        Follow-up Information     Follow up With Specialties Details Why Contact Info Additional Salena Duron MD Neurology In 1 week  [address] [address]  [address] [facility] Emergency Department Emergency Medicine  If symptoms worsen [address] [facility] [address]  [phone] [facility], [place], [place], [address]          Discharge Medication List as of 10/9/2018  7:44 AM      CONTINUE these medications which have NOT CHANGED    Details   aspirin 81 mg chewable tablet Chew 1 tablet daily, Historical Med      calcium citrate-vitamin D (CITRACAL+D) 315-200 MG-UNIT per tablet Take 1 tablet by mouth daily, Historical Med      cholecalciferol (VITAMIN D3) 1,000 units tablet Take 1 tablet by mouth daily, Historical Med      estradiol (ESTRACE) 2 MG tablet Take 1 tablet by mouth daily, Starting Wed 11/16/2016, Historical Med      Magnesium 500 MG CAPS Take by mouth, Historical Med           No discharge procedures on file      ED Provider  Electronically Signed by           Rosa Ceja MD  10/09/18 1706

## 2018-10-09 NOTE — ED NOTES
Second IV started for CT contrast dye  Client transported to [address] scan     Katya Birmingham, RN  10/09/18 3758

## 2018-10-09 NOTE — DISCHARGE INSTRUCTIONS
Dizziness   WHAT YOU NEED TO KNOW:   Dizziness is a feeling of being off balance or unsteady  Common causes of dizziness are an inner ear fluid imbalance or a lack of oxygen in your blood  Dizziness may be acute (lasts 3 days or less) or chronic (lasts longer than 3 days)  You may have dizzy spells that last from seconds to a few hours  DISCHARGE INSTRUCTIONS:   Return to the emergency department if:   · You have a headache and a stiff neck  · You have shaking chills and a fever  · You vomit over and over with no relief  · Your vomit or bowel movements are red or black  · You have pain in your chest, back, or abdomen  · You have numbness, especially in your face, arms, or legs  · You have trouble moving your arms or legs  · You are confused  Contact your healthcare provider if:   · You have a fever  · Your symptoms do not get better with treatment  · You have questions or concerns about your condition or care  Manage your symptoms:   · Do not drive  or operate heavy machinery when you are dizzy  · Get up slowly  from sitting or lying down  · Drink plenty of liquids  Liquids help prevent dehydration  Ask how much liquid to drink each day and which liquids are best for you  Follow up with your healthcare provider as directed:  Write down your questions so you remember to ask them during your visits  © 2017 [employer]  Information is for End User's use only and may not be sold, redistributed or otherwise used for commercial purposes  All illustrations and images included in CareNotes® are the copyrighted property of A D [employer] M [employer]  or [employer]  The above information is an  only  It is not intended as medical advice for individual conditions or treatments  Talk to your doctor, nurse or pharmacist before following any medical regimen to see if it is safe and effective for you

## 2018-10-10 LAB
ATRIAL RATE: 54 BPM
P AXIS: 41 DEGREES
PR INTERVAL: 172 MS
QRS AXIS: 42 DEGREES
QRSD INTERVAL: 92 MS
QT INTERVAL: 446 MS
QTC INTERVAL: 422 MS
T WAVE AXIS: 24 DEGREES
VENTRICULAR RATE: 54 BPM

## 2018-10-10 PROCEDURE — 93010 ELECTROCARDIOGRAM REPORT: CPT | Performed by: INTERNAL MEDICINE

## 2018-11-26 ENCOUNTER — OFFICE VISIT (OUTPATIENT)
Dept: NEUROLOGY | Facility: CLINIC | Age: 48
End: 2018-11-26
Payer: COMMERCIAL

## 2018-11-26 VITALS
WEIGHT: 193 LBS | DIASTOLIC BLOOD PRESSURE: 74 MMHG | BODY MASS INDEX: 30.29 KG/M2 | HEIGHT: 67 IN | SYSTOLIC BLOOD PRESSURE: 116 MMHG | HEART RATE: 64 BPM

## 2018-11-26 DIAGNOSIS — R42 DIZZINESS AND GIDDINESS: Primary | ICD-10-CM

## 2018-11-26 DIAGNOSIS — H81.10 BENIGN PAROXYSMAL POSITIONAL VERTIGO, UNSPECIFIED LATERALITY: ICD-10-CM

## 2018-11-26 PROCEDURE — 99244 OFF/OP CNSLTJ NEW/EST MOD 40: CPT | Performed by: PSYCHIATRY & NEUROLOGY

## 2018-11-26 RX ORDER — MELOXICAM 7.5 MG/1
7.5 TABLET ORAL DAILY
Refills: 1 | COMMUNITY
Start: 2018-09-22 | End: 2019-07-29 | Stop reason: ALTCHOICE

## 2018-11-26 NOTE — PROGRESS NOTES
Patient ID: Kirstin Coombs is a 50 y o  female  Assessment/Plan:     Problem List Items Addressed This Visit        Nervous and Auditory    Benign paroxysmal positional vertigo    Relevant Orders    Ambulatory referral to Physical Therapy    BUN    Creatinine, serum    Ambulatory Referral to Otolaryngology       Other    Dizziness and giddiness - Primary    Relevant Orders    MRI brain IAC wo and w contrast         Today I had the pleasure of seeing your patient, Irma Koch, in consultation at [address]  Mrs Kaushik Gonzalez had with clinical presentation of Vertigo with lightheadedness and headache on 10/09/2018  CTA head and neck was completed with no pathology noted, no concern for VBI or focal intracranial stenosis/dissection  Concern for BPPV with ambulatory dysfunction on right side- now with complete resolution of her symptoms  MRI brain will be further scheduled for IAC w/wo evaluation to rule out demyelination, infectious and inflammatory symptoms  We also agreed to have PT for vestibular therapy is symptoms return  ENT referral was also provided if symptoms come back and imaging provide no significant pathology  No focal neurologic deficit noted on exam  No UMN or extrapyramidal signs noted  Follow up in 4 months with Renetta Ortega  Subjective: dizziness/vertigo    HPI/History of Present Illness  Mrs Kaushik Gonzalez has a history of obesity, hearing loss, heart murmur, hysterectomy, family history of MND with neuropathy in her mother and Rodney disease in several siblings,  who presented after her recent hospitalization for dizziness  Patient went to ER 10/09/18 for feeling lightheaded and dizzy   Patient woke up at 4:15 am - she felt dizzy, and she was having difficulties walking, she had a headache, with nausea  Patient stated she described vertigo with disequilibrium and lightheadedness     No focal neurologic deficit has been described, but was not able to ambulate  No other medical condition  No nystagmus has been described  No neck pain and no muscle     Patient stated her symptoms lasted 3 5 weeks and she has transient lig headedness on and off  No symptoms has been described for the last 2 weeks, no ENT followed either  Patient has not had any signs of tremor or chorea, no extrapyramidal syndrome  The following portions of the patient's history were reviewed and updated as appropriate:   She  has no past medical history on file  She   Patient Active Problem List    Diagnosis Date Noted    Dizziness and giddiness 11/26/2018    Benign paroxysmal positional vertigo 11/26/2018     She  has a past surgical history that includes Hysterectomy  Her family history is not on file  She  reports that she has never smoked  She has never used smokeless tobacco  She reports that she drinks alcohol  She reports that she does not use drugs  Current Outpatient Prescriptions   Medication Sig Dispense Refill    aspirin 81 mg chewable tablet Chew 1 tablet daily      calcium citrate-vitamin D (CITRACAL+D) 315-200 MG-UNIT per tablet Take 1 tablet by mouth daily      cholecalciferol (VITAMIN D3) 1,000 units tablet Take 5,000 tablets by mouth daily        estradiol (ESTRACE) 2 MG tablet Take 1 tablet by mouth daily      Magnesium 500 MG CAPS Take by mouth 2 (two) times a day        meloxicam (MOBIC) 7 5 mg tablet Take 7 5 mg by mouth daily With food  1     No current facility-administered medications for this visit        Current Outpatient Prescriptions on File Prior to Visit   Medication Sig    aspirin 81 mg chewable tablet Chew 1 tablet daily    calcium citrate-vitamin D (CITRACAL+D) 315-200 MG-UNIT per tablet Take 1 tablet by mouth daily    cholecalciferol (VITAMIN D3) 1,000 units tablet Take 5,000 tablets by mouth daily      estradiol (ESTRACE) 2 MG tablet Take 1 tablet by mouth daily    Magnesium 500 MG CAPS Take by mouth 2 (two) times a day       No current facility-administered medications on file prior to visit  She has No Known Allergies            Objective:    Blood pressure 116/74, pulse 64, height 5' 7" (1 702 m), weight 87 5 kg (193 lb)  Physical Exam/Neurological Exam  CONSTITUTIONAL: NAD, pleasant  NECK: supple, no lymphadenopathy, no thyromegaly, no JVD  CARDIOVASCULAR: RRR, normal S1S2, no murmurs, no rubs  RESP: clear to auscultation bilaterally, no wheezes/rhonchi/rales  ABDOMEN: soft, non tender, non distended  SKIN: no rash or skin lesions  EXTREMITIES: no edema, pulses 2+bilaterally  PSYCH: appropriate mood and affect  NEUROLOGIC COMPREHENSIVE EXAM: Patient is oriented to person, place and time, NAD; appropriate affect  CN II, III, IV, V, VI, VII,VIII,IX,X,XI-XII intact with EOMI, PERRLA, OKN intact, VF grossly intact, fundi poorly visualized secondary to pupillary constriction; symmetric face noted  Motor: 5/5 UE/LE bilateral symmetric; Sensory: intact to light touch and pinprick bilaterally; normal vibration sensation feet bilaterally; Coordination within normal limits on FTN and MARIO testing; DTR: 2/4 through, no Babinski, no clonus  Tandem gait is intact  Romberg: negative  ROS:  12 points of review of system was reviewed with the patient and was unremarkable with exception: see HPI  Review of Systems   Constitutional: Positive for unexpected weight change  Negative for appetite change and fever  HENT: Negative  Negative for hearing loss, tinnitus, trouble swallowing and voice change  Eyes: Positive for photophobia and visual disturbance (blurred vision)  Negative for pain  Respiratory: Negative  Negative for shortness of breath  Cardiovascular: Negative  Negative for palpitations  Gastrointestinal: Negative  Negative for nausea and vomiting  Endocrine: Negative  Negative for cold intolerance and heat intolerance  Genitourinary: Negative  Negative for dysuria, frequency and urgency     Musculoskeletal: Positive for gait problem (balance problems and difficulty walking)  Negative for myalgias and neck pain  Skin: Negative  Negative for rash  Neurological: Positive for dizziness, light-headedness and headaches  Negative for tremors, seizures, syncope, facial asymmetry, speech difficulty, weakness and numbness  Hematological: Negative  Does not bruise/bleed easily  Psychiatric/Behavioral: Negative  Negative for confusion, hallucinations and sleep disturbance

## 2019-03-01 ENCOUNTER — TELEPHONE (OUTPATIENT)
Dept: NEUROLOGY | Facility: CLINIC | Age: 49
End: 2019-03-01

## 2019-03-22 ENCOUNTER — TRANSCRIBE ORDERS (OUTPATIENT)
Dept: ADMINISTRATIVE | Facility: HOSPITAL | Age: 49
End: 2019-03-22

## 2019-03-22 DIAGNOSIS — Z12.39 BREAST SCREENING: Primary | ICD-10-CM

## 2019-04-09 ENCOUNTER — HOSPITAL ENCOUNTER (OUTPATIENT)
Dept: RADIOLOGY | Facility: HOSPITAL | Age: 49
Discharge: HOME | End: 2019-04-09
Attending: OBSTETRICS & GYNECOLOGY

## 2019-04-09 DIAGNOSIS — Z12.39 BREAST SCREENING: ICD-10-CM

## 2019-04-16 ENCOUNTER — HOSPITAL ENCOUNTER (OUTPATIENT)
Dept: RADIOLOGY | Facility: HOSPITAL | Age: 49
Discharge: HOME | End: 2019-04-16
Attending: OBSTETRICS & GYNECOLOGY
Payer: COMMERCIAL

## 2019-04-16 VITALS — WEIGHT: 170 LBS | BODY MASS INDEX: 26.68 KG/M2 | HEIGHT: 67 IN

## 2019-04-16 DIAGNOSIS — Z12.39 BREAST SCREENING: ICD-10-CM

## 2019-04-16 PROCEDURE — 77063 BREAST TOMOSYNTHESIS BI: CPT

## 2019-04-16 PROCEDURE — 77067 SCR MAMMO BI INCL CAD: CPT

## 2019-05-19 PROBLEM — H91.90 HEARING LOSS: Status: ACTIVE | Noted: 2017-03-05

## 2019-05-20 ENCOUNTER — OFFICE VISIT (OUTPATIENT)
Dept: FAMILY MEDICINE CLINIC | Facility: CLINIC | Age: 49
End: 2019-05-20
Payer: COMMERCIAL

## 2019-05-20 VITALS
HEART RATE: 68 BPM | RESPIRATION RATE: 16 BRPM | SYSTOLIC BLOOD PRESSURE: 136 MMHG | DIASTOLIC BLOOD PRESSURE: 84 MMHG | HEIGHT: 67 IN | BODY MASS INDEX: 28.66 KG/M2 | TEMPERATURE: 96.3 F | WEIGHT: 182.6 LBS

## 2019-05-20 DIAGNOSIS — Z13.83 SCREENING FOR CARDIOVASCULAR, RESPIRATORY, AND GENITOURINARY DISEASES: ICD-10-CM

## 2019-05-20 DIAGNOSIS — Z90.710 HX OF HYSTERECTOMY: ICD-10-CM

## 2019-05-20 DIAGNOSIS — Z13.6 SCREENING FOR CARDIOVASCULAR, RESPIRATORY, AND GENITOURINARY DISEASES: ICD-10-CM

## 2019-05-20 DIAGNOSIS — E66.3 OVERWEIGHT (BMI 25.0-29.9): ICD-10-CM

## 2019-05-20 DIAGNOSIS — Z90.722 H/O BILATERAL OOPHORECTOMY: ICD-10-CM

## 2019-05-20 DIAGNOSIS — Z13.89 SCREENING FOR CARDIOVASCULAR, RESPIRATORY, AND GENITOURINARY DISEASES: ICD-10-CM

## 2019-05-20 DIAGNOSIS — Z13.1 SCREENING FOR DIABETES MELLITUS (DM): ICD-10-CM

## 2019-05-20 DIAGNOSIS — Z00.00 HEALTHCARE MAINTENANCE: Primary | ICD-10-CM

## 2019-05-20 PROBLEM — R42 DIZZINESS AND GIDDINESS: Status: RESOLVED | Noted: 2018-11-26 | Resolved: 2019-05-20

## 2019-05-20 PROBLEM — H81.10 BENIGN PAROXYSMAL POSITIONAL VERTIGO: Status: RESOLVED | Noted: 2018-11-26 | Resolved: 2019-05-20

## 2019-05-20 PROCEDURE — 99396 PREV VISIT EST AGE 40-64: CPT | Performed by: FAMILY MEDICINE

## 2019-05-20 RX ORDER — PHENTERMINE HYDROCHLORIDE 37.5 MG/1
37.5 TABLET ORAL EVERY MORNING
Qty: 30 TABLET | Refills: 1 | Status: SHIPPED | OUTPATIENT
Start: 2019-05-20 | End: 2019-06-27 | Stop reason: SDUPTHER

## 2019-05-20 RX ORDER — PHENTERMINE HYDROCHLORIDE 37.5 MG/1
37.5 TABLET ORAL EVERY MORNING
Qty: 30 TABLET | Refills: 1 | Status: SHIPPED | OUTPATIENT
Start: 2019-05-20 | End: 2019-05-20 | Stop reason: SDUPTHER

## 2019-05-23 ENCOUNTER — TELEPHONE (OUTPATIENT)
Dept: FAMILY MEDICINE CLINIC | Facility: CLINIC | Age: 49
End: 2019-05-23

## 2019-06-27 ENCOUNTER — OFFICE VISIT (OUTPATIENT)
Dept: FAMILY MEDICINE CLINIC | Facility: CLINIC | Age: 49
End: 2019-06-27
Payer: COMMERCIAL

## 2019-06-27 VITALS
RESPIRATION RATE: 16 BRPM | BODY MASS INDEX: 28.38 KG/M2 | HEIGHT: 67 IN | SYSTOLIC BLOOD PRESSURE: 126 MMHG | WEIGHT: 180.8 LBS | HEART RATE: 72 BPM | TEMPERATURE: 97.2 F | DIASTOLIC BLOOD PRESSURE: 84 MMHG

## 2019-06-27 DIAGNOSIS — E66.3 OVERWEIGHT (BMI 25.0-29.9): Primary | ICD-10-CM

## 2019-06-27 DIAGNOSIS — Z91.89 FRAMINGHAM CARDIAC RISK <10% IN NEXT 10 YEARS: ICD-10-CM

## 2019-06-27 PROCEDURE — 3008F BODY MASS INDEX DOCD: CPT | Performed by: FAMILY MEDICINE

## 2019-06-27 PROCEDURE — 99213 OFFICE O/P EST LOW 20 MIN: CPT | Performed by: FAMILY MEDICINE

## 2019-06-27 PROCEDURE — 1036F TOBACCO NON-USER: CPT | Performed by: FAMILY MEDICINE

## 2019-06-27 RX ORDER — PHENTERMINE HYDROCHLORIDE 37.5 MG/1
37.5 TABLET ORAL EVERY MORNING
Qty: 30 TABLET | Refills: 1 | Status: SHIPPED | OUTPATIENT
Start: 2019-06-27 | End: 2019-07-29 | Stop reason: SDUPTHER

## 2019-07-29 ENCOUNTER — OFFICE VISIT (OUTPATIENT)
Dept: FAMILY MEDICINE CLINIC | Facility: CLINIC | Age: 49
End: 2019-07-29
Payer: COMMERCIAL

## 2019-07-29 VITALS
WEIGHT: 176 LBS | BODY MASS INDEX: 27.62 KG/M2 | RESPIRATION RATE: 16 BRPM | DIASTOLIC BLOOD PRESSURE: 80 MMHG | HEIGHT: 67 IN | SYSTOLIC BLOOD PRESSURE: 130 MMHG | HEART RATE: 68 BPM | TEMPERATURE: 96.3 F

## 2019-07-29 DIAGNOSIS — M72.2 BILATERAL PLANTAR FASCIITIS: ICD-10-CM

## 2019-07-29 DIAGNOSIS — S93.491A SPRAIN OF ANTERIOR TALOFIBULAR LIGAMENT OF RIGHT ANKLE, INITIAL ENCOUNTER: Primary | ICD-10-CM

## 2019-07-29 DIAGNOSIS — E66.3 OVERWEIGHT (BMI 25.0-29.9): ICD-10-CM

## 2019-07-29 PROCEDURE — 1036F TOBACCO NON-USER: CPT | Performed by: FAMILY MEDICINE

## 2019-07-29 PROCEDURE — 3008F BODY MASS INDEX DOCD: CPT | Performed by: FAMILY MEDICINE

## 2019-07-29 PROCEDURE — 99214 OFFICE O/P EST MOD 30 MIN: CPT | Performed by: FAMILY MEDICINE

## 2019-07-29 RX ORDER — PHENTERMINE HYDROCHLORIDE 37.5 MG/1
37.5 TABLET ORAL EVERY MORNING
Qty: 30 TABLET | Refills: 1 | Status: SHIPPED | OUTPATIENT
Start: 2019-07-29 | End: 2020-11-07

## 2019-07-29 NOTE — PROGRESS NOTES
Assessment/Plan:    No problem-specific Assessment & Plan notes found for this encounter  If ankle sprain fails to improve, she can call for PT order  bmi discussed, cont diet/exercise/fastin  Plantar fascitis care and prevention, ice and massage advised     Diagnoses and all orders for this visit:    Sprain of anterior talofibular ligament of right ankle, initial encounter    Overweight (BMI 25 0-29 9)  -     phentermine (ADIPEX-P) 37 5 MG tablet; Take 1 tablet (37 5 mg total) by mouth every morning (before breakfast)    Bilateral plantar fasciitis        Return in about 1 month (around 8/29/2019) for Recheck  Subjective:      Patient ID: Addison Rebollar is a 52 y o  female  Chief Complaint   Patient presents with    Follow-up     1 month f/u-wmcma       HPI  Counting calories  Sprained ankle recently, about 3w ago  Was running/walking  Starting to run more now, getting better  Denies palpitations, chest pain, mood changes, irritability, WILHELM, edema or syncope  Use of phentermine as an adjunct to diet/exercise program aware  Accepts risks of medications as discussed  Pants getting looser  Lost 4#  Heel pain, b/l, since ankle sprain, worse in am and first few steps, better with movt, did have right ankle swelling and bruising for a while, took aleve, no numbness    The following portions of the patient's history were reviewed and updated as appropriate: allergies, current medications, past family history, past medical history, past social history, past surgical history and problem list     Review of Systems   Respiratory: Negative for shortness of breath  Cardiovascular: Negative for chest pain and palpitations           Current Outpatient Medications   Medication Sig Dispense Refill    aspirin 81 mg chewable tablet Chew 1 tablet daily      calcium citrate-vitamin D (CITRACAL+D) 315-200 MG-UNIT per tablet Take 1 tablet by mouth daily      cholecalciferol (VITAMIN D3) 1,000 units tablet Take 5,000 tablets by mouth daily        estradiol (ESTRACE) 2 MG tablet Take 1 tablet by mouth daily      Magnesium 500 MG CAPS Take by mouth 2 (two) times a day        phentermine (ADIPEX-P) 37 5 MG tablet Take 1 tablet (37 5 mg total) by mouth every morning (before breakfast) 30 tablet 1     No current facility-administered medications for this visit  Objective:    /80   Pulse 68   Temp (!) 96 3 °F (35 7 °C)   Resp 16   Ht 5' 7" (1 702 m)   Wt 79 8 kg (176 lb)   BMI 27 57 kg/m²        Physical Exam   Constitutional: She appears well-developed  No distress  HENT:   Head: Normocephalic  Mouth/Throat: No oropharyngeal exudate  Eyes: Conjunctivae are normal  No scleral icterus  Neck: Neck supple  Cardiovascular: Normal rate, regular rhythm and intact distal pulses  Exam reveals no friction rub  No murmur heard  Pulmonary/Chest: Effort normal  No respiratory distress  She has no wheezes  She has no rales  Abdominal: Soft  Bowel sounds are normal  She exhibits no distension  Musculoskeletal: She exhibits tenderness  She exhibits no edema or deformity  Right ATF with plantar flex and inversion, no malleolar pain b/l   Neurological: She is alert  No sensory deficit  She exhibits normal muscle tone  Skin: Skin is warm and dry  No rash noted  No pallor  Psychiatric: Her behavior is normal  Thought content normal    Nursing note and vitals reviewed               Walker Sibley DO

## 2020-02-10 ENCOUNTER — TELEPHONE (OUTPATIENT)
Dept: FAMILY MEDICINE CLINIC | Facility: CLINIC | Age: 50
End: 2020-02-10

## 2020-02-10 DIAGNOSIS — Z12.11 COLON CANCER SCREENING: Primary | ICD-10-CM

## 2020-03-09 ENCOUNTER — TRANSCRIBE ORDERS (OUTPATIENT)
Dept: ADMINISTRATIVE | Facility: HOSPITAL | Age: 50
End: 2020-03-09

## 2020-03-09 DIAGNOSIS — Z12.31 ENCOUNTER FOR SCREENING MAMMOGRAM FOR BREAST CANCER: Primary | ICD-10-CM

## 2020-04-21 PROBLEM — K64.8 INTERNAL HEMORRHOIDS: Status: ACTIVE | Noted: 2020-04-21

## 2020-04-21 PROBLEM — K57.30 DIVERTICULOSIS OF COLON: Status: ACTIVE | Noted: 2020-04-21

## 2020-04-21 PROBLEM — Z86.0100 PERSONAL HISTORY OF COLONIC POLYPS: Status: ACTIVE | Noted: 2020-04-21

## 2020-04-21 PROBLEM — Z86.010 PERSONAL HISTORY OF COLONIC POLYPS: Status: ACTIVE | Noted: 2020-04-21

## 2020-06-05 ENCOUNTER — HOSPITAL ENCOUNTER (OUTPATIENT)
Dept: RADIOLOGY | Facility: HOSPITAL | Age: 50
Discharge: HOME | End: 2020-06-05
Attending: OBSTETRICS & GYNECOLOGY
Payer: COMMERCIAL

## 2020-06-05 VITALS — HEIGHT: 67 IN | WEIGHT: 180 LBS | BODY MASS INDEX: 28.25 KG/M2

## 2020-06-05 DIAGNOSIS — Z12.31 ENCOUNTER FOR SCREENING MAMMOGRAM FOR BREAST CANCER: ICD-10-CM

## 2020-06-05 PROCEDURE — 77063 BREAST TOMOSYNTHESIS BI: CPT

## 2020-06-05 PROCEDURE — 77067 SCR MAMMO BI INCL CAD: CPT

## 2020-09-15 ENCOUNTER — TELEPHONE (OUTPATIENT)
Dept: DERMATOLOGY | Facility: CLINIC | Age: 50
End: 2020-09-15

## 2020-11-07 PROBLEM — M72.2 BILATERAL PLANTAR FASCIITIS: Status: RESOLVED | Noted: 2019-07-29 | Resolved: 2020-11-07

## 2020-11-07 PROBLEM — S93.491A SPRAIN OF ANTERIOR TALOFIBULAR LIGAMENT OF RIGHT ANKLE: Status: RESOLVED | Noted: 2019-07-29 | Resolved: 2020-11-07

## 2021-05-10 ENCOUNTER — OFFICE VISIT (OUTPATIENT)
Dept: FAMILY MEDICINE CLINIC | Facility: CLINIC | Age: 51
End: 2021-05-10
Payer: COMMERCIAL

## 2021-05-10 VITALS
BODY MASS INDEX: 32.14 KG/M2 | HEIGHT: 66 IN | TEMPERATURE: 98 F | DIASTOLIC BLOOD PRESSURE: 68 MMHG | SYSTOLIC BLOOD PRESSURE: 126 MMHG | HEART RATE: 60 BPM | WEIGHT: 200 LBS

## 2021-05-10 DIAGNOSIS — Z00.00 HEALTHCARE MAINTENANCE: Primary | ICD-10-CM

## 2021-05-10 DIAGNOSIS — Z13.83 SCREENING FOR CARDIOVASCULAR, RESPIRATORY, AND GENITOURINARY DISEASES: ICD-10-CM

## 2021-05-10 DIAGNOSIS — Z13.6 SCREENING FOR CARDIOVASCULAR, RESPIRATORY, AND GENITOURINARY DISEASES: ICD-10-CM

## 2021-05-10 DIAGNOSIS — Z13.89 SCREENING FOR CARDIOVASCULAR, RESPIRATORY, AND GENITOURINARY DISEASES: ICD-10-CM

## 2021-05-10 DIAGNOSIS — Z13.1 SCREENING FOR DIABETES MELLITUS (DM): ICD-10-CM

## 2021-05-10 DIAGNOSIS — E66.9 OBESITY (BMI 30-39.9): ICD-10-CM

## 2021-05-10 PROCEDURE — 3725F SCREEN DEPRESSION PERFORMED: CPT | Performed by: FAMILY MEDICINE

## 2021-05-10 PROCEDURE — 99396 PREV VISIT EST AGE 40-64: CPT | Performed by: FAMILY MEDICINE

## 2021-05-10 PROCEDURE — 1036F TOBACCO NON-USER: CPT | Performed by: FAMILY MEDICINE

## 2021-05-10 PROCEDURE — 3008F BODY MASS INDEX DOCD: CPT | Performed by: FAMILY MEDICINE

## 2021-05-10 NOTE — PROGRESS NOTES
Assessment/Plan:    No problem-specific Assessment & Plan notes found for this encounter  cpe done  Labs ordered  Consider saxenda or contrave in future if covered     Diagnoses and all orders for this visit:    Healthcare maintenance    Screening for cardiovascular, respiratory, and genitourinary diseases  -     Lipid Panel with Direct LDL reflex; Future    Screening for diabetes mellitus (DM)  -     Comprehensive metabolic panel; Future    BMI 32 0-32 9,adult  -     TSH, 3rd generation; Future    Obesity (BMI 30-39 9)  -     TSH, 3rd generation; Future              Return if symptoms worsen or fail to improve  Subjective:      Patient ID: Gabrielle Elizondo is a 46 y o  female  Chief Complaint   Patient presents with   [facility] Dr Khushboo Crowder for annual GYN exams [id number]       HPI  [employer] job  Not much stress  Lifting for 1y, gym in basement  4-5x/w  Not much cardio  Diet is good  Low fat  Trying to watch carbs  25# gain in 2y  Had hyster/oopher 5y ago  On estradiol pills, Dr Khushboo Crowder, mammo due    The following portions of the patient's history were reviewed and updated as appropriate: allergies, current medications, past family history, past medical history, past social history, past surgical history and problem list     Review of Systems   Constitutional: Negative for fever  Respiratory: Negative for shortness of breath  Current Outpatient Medications   Medication Sig Dispense Refill    aspirin 81 mg chewable tablet Chew 1 tablet daily      calcium citrate-vitamin D (CITRACAL+D) 315-200 MG-UNIT per tablet Take 1 tablet by mouth daily      cholecalciferol (VITAMIN D3) 1,000 units tablet Take 5,000 tablets by mouth daily        estradiol (ESTRACE) 2 MG tablet Take 1 tablet by mouth daily      Magnesium 500 MG CAPS Take by mouth 2 (two) times a day         No current facility-administered medications for this visit          Objective:    /68   Pulse 60   Temp 98 °F (36 7 °C)   Ht 5' 6" (1 676 m)   Wt 90 7 kg (200 lb)   BMI 32 28 kg/m²        Physical Exam  Vitals signs and nursing note reviewed  Constitutional:       General: She is not in acute distress  Appearance: She is well-developed  She is obese  She is not ill-appearing  HENT:      Head: Normocephalic  Right Ear: Tympanic membrane normal       Left Ear: Tympanic membrane normal    Eyes:      General: No scleral icterus  Conjunctiva/sclera: Conjunctivae normal    Neck:      Musculoskeletal: Neck supple  No muscular tenderness  Thyroid: No thyromegaly  Cardiovascular:      Rate and Rhythm: Normal rate and regular rhythm  Heart sounds: No murmur  Pulmonary:      Effort: Pulmonary effort is normal  No respiratory distress  Breath sounds: No wheezing  Abdominal:      General: There is no distension  Palpations: Abdomen is soft  Musculoskeletal:         General: No deformity  Right lower leg: No edema  Left lower leg: No edema  Skin:     General: Skin is warm and dry  Neurological:      General: No focal deficit present  Mental Status: She is alert  Motor: No weakness  Gait: Gait normal    Psychiatric:         Mood and Affect: Mood normal          Behavior: Behavior normal          Thought Content: Thought content normal        BMI Counseling: Body mass index is 32 28 kg/m²  The BMI is above normal  Nutrition recommendations include decreasing portion sizes and moderation in carbohydrate intake  Exercise recommendations include exercising 3-5 times per week  No pharmacotherapy was ordered                  Airam Reed DO

## 2021-05-12 ENCOUNTER — TRANSCRIBE ORDERS (OUTPATIENT)
Dept: ADMINISTRATIVE | Facility: HOSPITAL | Age: 51
End: 2021-05-12

## 2021-05-12 ENCOUNTER — APPOINTMENT (OUTPATIENT)
Dept: LAB | Facility: HOSPITAL | Age: 51
End: 2021-05-12
Attending: FAMILY MEDICINE
Payer: COMMERCIAL

## 2021-05-12 DIAGNOSIS — Z13.1 SCREENING FOR DIABETES MELLITUS (DM): ICD-10-CM

## 2021-05-12 DIAGNOSIS — Z13.89 SCREENING FOR CARDIOVASCULAR, RESPIRATORY, AND GENITOURINARY DISEASES: ICD-10-CM

## 2021-05-12 DIAGNOSIS — Z13.6 SCREENING FOR CARDIOVASCULAR, RESPIRATORY, AND GENITOURINARY DISEASES: ICD-10-CM

## 2021-05-12 DIAGNOSIS — Z13.83 SCREENING FOR CARDIOVASCULAR, RESPIRATORY, AND GENITOURINARY DISEASES: ICD-10-CM

## 2021-05-12 DIAGNOSIS — E66.9 OBESITY (BMI 30-39.9): ICD-10-CM

## 2021-05-12 LAB
ALBUMIN SERPL BCP-MCNC: 3.9 G/DL (ref 3.5–5)
ALP SERPL-CCNC: 68 U/L (ref 46–116)
ALT SERPL W P-5'-P-CCNC: 37 U/L (ref 12–78)
ANION GAP SERPL CALCULATED.3IONS-SCNC: 5 MMOL/L (ref 4–13)
AST SERPL W P-5'-P-CCNC: 28 U/L (ref 5–45)
BILIRUB SERPL-MCNC: 0.55 MG/DL (ref 0.2–1)
BUN SERPL-MCNC: 20 MG/DL (ref 5–25)
CALCIUM SERPL-MCNC: 9.7 MG/DL (ref 8.3–10.1)
CHLORIDE SERPL-SCNC: 105 MMOL/L (ref 100–108)
CHOLEST SERPL-MCNC: 221 MG/DL (ref 50–200)
CO2 SERPL-SCNC: 31 MMOL/L (ref 21–32)
CREAT SERPL-MCNC: 0.86 MG/DL (ref 0.6–1.3)
GFR SERPL CREATININE-BSD FRML MDRD: 78 ML/MIN/1.73SQ M
GLUCOSE P FAST SERPL-MCNC: 103 MG/DL (ref 65–99)
HDLC SERPL-MCNC: 55 MG/DL
LDLC SERPL CALC-MCNC: 136 MG/DL (ref 0–100)
POTASSIUM SERPL-SCNC: 5.1 MMOL/L (ref 3.5–5.3)
PROT SERPL-MCNC: 7.6 G/DL (ref 6.4–8.2)
SODIUM SERPL-SCNC: 141 MMOL/L (ref 136–145)
TRIGL SERPL-MCNC: 152 MG/DL
TSH SERPL DL<=0.05 MIU/L-ACNC: 2.03 UIU/ML (ref 0.36–3.74)

## 2021-05-12 PROCEDURE — 80061 LIPID PANEL: CPT

## 2021-05-12 PROCEDURE — 80053 COMPREHEN METABOLIC PANEL: CPT

## 2021-05-12 PROCEDURE — 84443 ASSAY THYROID STIM HORMONE: CPT

## 2021-05-12 PROCEDURE — 36415 COLL VENOUS BLD VENIPUNCTURE: CPT

## 2021-05-20 ENCOUNTER — TELEPHONE (OUTPATIENT)
Dept: FAMILY MEDICINE CLINIC | Facility: CLINIC | Age: 51
End: 2021-05-20

## 2021-05-20 DIAGNOSIS — E66.9 OBESITY (BMI 30-39.9): Primary | ICD-10-CM

## 2021-05-20 NOTE — TELEPHONE ENCOUNTER
PT was returning you call back the medications and she can get the shingrix at the office  Neither medication was approved by her insurance   Pls advise     [phone]

## 2021-05-21 NOTE — TELEPHONE ENCOUNTER
Noted   Pt was asked to see if contrave or saxenda covered for wt loss  She is aware of phentermine option and need for f/u if she starts, as she was on this before

## 2021-05-25 ENCOUNTER — TRANSCRIBE ORDERS (OUTPATIENT)
Dept: ADMINISTRATIVE | Facility: HOSPITAL | Age: 51
End: 2021-05-25

## 2021-05-25 DIAGNOSIS — Z12.31 SCREENING MAMMOGRAM FOR HIGH-RISK PATIENT: Primary | ICD-10-CM

## 2021-05-27 RX ORDER — PHENTERMINE HYDROCHLORIDE 37.5 MG/1
37.5 TABLET ORAL EVERY MORNING
Qty: 90 TABLET | Refills: 0 | Status: SHIPPED | OUTPATIENT
Start: 2021-05-27 | End: 2021-07-14 | Stop reason: SDUPTHER

## 2021-05-27 NOTE — TELEPHONE ENCOUNTER
sw pt  Risks/benefits of phentermine adjunct rx understood  F/u 6w  Start with 1/2 tab x 2w then whole tab after

## 2021-05-27 NOTE — TELEPHONE ENCOUNTER
Patient called back and stated that she called her insurance and Contrave and Saxenda are both NOT covered  What is next option? She also stated her insurance verified that she can have the shingles vaccine at either a pharmacy OR in the office  Please have nurse review chart and then schedule her here in office  Please call patient  She would like to speak with you

## 2021-06-08 ENCOUNTER — HOSPITAL ENCOUNTER (OUTPATIENT)
Dept: RADIOLOGY | Facility: HOSPITAL | Age: 51
Discharge: HOME | End: 2021-06-08
Payer: COMMERCIAL

## 2021-06-08 VITALS — WEIGHT: 190 LBS | BODY MASS INDEX: 29.82 KG/M2 | HEIGHT: 67 IN

## 2021-06-08 DIAGNOSIS — Z12.31 SCREENING MAMMOGRAM FOR HIGH-RISK PATIENT: ICD-10-CM

## 2021-06-08 PROCEDURE — 77067 SCR MAMMO BI INCL CAD: CPT

## 2021-06-08 PROCEDURE — 77063 BREAST TOMOSYNTHESIS BI: CPT

## 2021-07-14 ENCOUNTER — OFFICE VISIT (OUTPATIENT)
Dept: FAMILY MEDICINE CLINIC | Facility: CLINIC | Age: 51
End: 2021-07-14
Payer: COMMERCIAL

## 2021-07-14 VITALS
WEIGHT: 189 LBS | DIASTOLIC BLOOD PRESSURE: 82 MMHG | SYSTOLIC BLOOD PRESSURE: 136 MMHG | HEIGHT: 67 IN | BODY MASS INDEX: 29.66 KG/M2 | RESPIRATION RATE: 18 BRPM | TEMPERATURE: 97.1 F | HEART RATE: 68 BPM

## 2021-07-14 DIAGNOSIS — E66.9 OBESITY (BMI 30-39.9): Primary | ICD-10-CM

## 2021-07-14 DIAGNOSIS — Z23 IMMUNIZATION DUE: ICD-10-CM

## 2021-07-14 PROBLEM — H91.90 HEARING LOSS: Status: RESOLVED | Noted: 2017-03-05 | Resolved: 2021-07-14

## 2021-07-14 PROCEDURE — 3008F BODY MASS INDEX DOCD: CPT | Performed by: FAMILY MEDICINE

## 2021-07-14 PROCEDURE — 90750 HZV VACC RECOMBINANT IM: CPT

## 2021-07-14 PROCEDURE — 90471 IMMUNIZATION ADMIN: CPT

## 2021-07-14 PROCEDURE — 99213 OFFICE O/P EST LOW 20 MIN: CPT | Performed by: FAMILY MEDICINE

## 2021-07-14 PROCEDURE — 1036F TOBACCO NON-USER: CPT | Performed by: FAMILY MEDICINE

## 2021-07-14 RX ORDER — ESTRADIOL 0.1 MG/G
CREAM VAGINAL
COMMUNITY
Start: 2021-05-14 | End: 2022-02-03 | Stop reason: ALTCHOICE

## 2021-07-14 RX ORDER — PHENTERMINE HYDROCHLORIDE 37.5 MG/1
37.5 TABLET ORAL EVERY MORNING
Qty: 90 TABLET | Refills: 0 | Status: SHIPPED | OUTPATIENT
Start: 2021-07-14 | End: 2021-09-28 | Stop reason: SDUPTHER

## 2021-07-14 NOTE — PROGRESS NOTES
Assessment/Plan:    No problem-specific Assessment & Plan notes found for this encounter  Obesity improving, cont meds and diet and exercise  F/u in 1m     Diagnoses and all orders for this visit:    Obesity (BMI 30-39 9)  -     phentermine (ADIPEX-P) 37 5 MG tablet; Take 1 tablet (37 5 mg total) by mouth every morning (before breakfast)    BMI 29 0-29 9,adult    Immunization due  -     Zoster Vaccine Recombinant IM    Other orders  -     estradiol (ESTRACE) 0 1 mg/g vaginal cream; every 7 days        Return in about 1 month (around 8/14/2021) for Recheck  Subjective:      Patient ID: Wilmar Zurita is a 46 y o  female  Chief Complaint   Patient presents with    Follow-up     medication check nm/lpn       HPI  Phentermine whole pill now  Tolerating w/o side effects    Denies palpitations, chest pain, mood changes, irritability, WILHELM, edema or syncope  Use of phentermine as an adjunct to diet/exercise program aware  Accepts risks of medications as discussed  Exercises 5x/w    Lost 11#    Drinks water well    The following portions of the patient's history were reviewed and updated as appropriate: allergies, current medications, past family history, past medical history, past social history, past surgical history and problem list     Review of Systems   Cardiovascular: Negative for chest pain, palpitations and leg swelling           Current Outpatient Medications   Medication Sig Dispense Refill    aspirin 81 mg chewable tablet Chew 1 tablet daily      calcium citrate-vitamin D (CITRACAL+D) 315-200 MG-UNIT per tablet Take 1 tablet by mouth daily      cholecalciferol (VITAMIN D3) 1,000 units tablet Take 5,000 tablets by mouth daily        estradiol (ESTRACE) 0 1 mg/g vaginal cream every 7 days      estradiol (ESTRACE) 2 MG tablet Take 1 tablet by mouth daily      Magnesium 500 MG CAPS Take by mouth 2 (two) times a day        phentermine (ADIPEX-P) 37 5 MG tablet Take 1 tablet (37 5 mg total) by mouth every morning (before breakfast) 90 tablet 0     No current facility-administered medications for this visit  Objective:    /82   Pulse 68   Temp (!) 97 1 °F (36 2 °C)   Resp 18   Ht 5' 7" (1 702 m)   Wt 85 7 kg (189 lb)   BMI 29 60 kg/m²        Physical Exam  Vitals and nursing note reviewed  Constitutional:       General: She is not in acute distress  Appearance: She is well-developed  She is not ill-appearing  HENT:      Head: Normocephalic  Eyes:      Conjunctiva/sclera: Conjunctivae normal    Cardiovascular:      Rate and Rhythm: Normal rate and regular rhythm  Heart sounds: No murmur heard  Pulmonary:      Effort: Pulmonary effort is normal  No respiratory distress  Abdominal:      Palpations: Abdomen is soft  Musculoskeletal:         General: No deformity  Cervical back: Neck supple  Right lower leg: No edema  Left lower leg: No edema  Skin:     General: Skin is warm and dry  Neurological:      Mental Status: She is alert  Psychiatric:         Behavior: Behavior normal          Thought Content:  Thought content normal                 Franklin Carlos, DO

## 2021-08-06 ENCOUNTER — RA CDI HCC (OUTPATIENT)
Dept: OTHER | Facility: HOSPITAL | Age: 51
End: 2021-08-06

## 2021-08-06 NOTE — PROGRESS NOTES
[facility]  coding opportunities          Chart reviewed, no opportunity found: CHART REVIEWED, NO OPPORTUNITY FOUND                     Patients insurance company: [employer] (Medicare Advantage and Commercial)

## 2021-08-13 ENCOUNTER — OFFICE VISIT (OUTPATIENT)
Dept: FAMILY MEDICINE CLINIC | Facility: CLINIC | Age: 51
End: 2021-08-13
Payer: COMMERCIAL

## 2021-08-13 VITALS
BODY MASS INDEX: 29.19 KG/M2 | HEART RATE: 76 BPM | TEMPERATURE: 98 F | HEIGHT: 67 IN | SYSTOLIC BLOOD PRESSURE: 122 MMHG | WEIGHT: 186 LBS | OXYGEN SATURATION: 99 % | RESPIRATION RATE: 16 BRPM | DIASTOLIC BLOOD PRESSURE: 84 MMHG

## 2021-08-13 DIAGNOSIS — E66.3 OVERWEIGHT (BMI 25.0-29.9): ICD-10-CM

## 2021-08-13 PROCEDURE — 3008F BODY MASS INDEX DOCD: CPT | Performed by: FAMILY MEDICINE

## 2021-08-13 PROCEDURE — 99213 OFFICE O/P EST LOW 20 MIN: CPT | Performed by: FAMILY MEDICINE

## 2021-08-13 PROCEDURE — 1036F TOBACCO NON-USER: CPT | Performed by: FAMILY MEDICINE

## 2021-08-13 NOTE — PROGRESS NOTES
Assessment/Plan:    No problem-specific Assessment & Plan notes found for this encounter  bmi improving  Continue phentermine as adjunct  Skin texture changes, use sunscreen     Diagnoses and all orders for this visit:    BMI 29 0-29 9,adult    Overweight (BMI 25 0-29  9)        Return in about 1 month (around 9/13/2021) for Recheck  Subjective:      Patient ID: Parvin Kline is a 46 y o  female  Chief Complaint   Patient presents with    Follow-up     Medications mz cma       HPI    Missed meds for 1w  Felt hungrier w/o it  Controlled self though    Still dieting and exercising  Limits snacks  Did lose weight  Clothes looser    Denies palpitations, chest pain, mood changes, irritability, WILHELM, edema or syncope  Use of phentermine as an adjunct to diet/exercise program aware  Accepts risks of medications as discussed  The following portions of the patient's history were reviewed and updated as appropriate: allergies, current medications, past family history, past medical history, past social history, past surgical history and problem list     Review of Systems   Cardiovascular: Negative for chest pain, palpitations and leg swelling  Current Outpatient Medications   Medication Sig Dispense Refill    aspirin 81 mg chewable tablet Chew 1 tablet daily      calcium citrate-vitamin D (CITRACAL+D) 315-200 MG-UNIT per tablet Take 1 tablet by mouth daily      cholecalciferol (VITAMIN D3) 1,000 units tablet Take 5,000 tablets by mouth daily        estradiol (ESTRACE) 0 1 mg/g vaginal cream every 7 days      estradiol (ESTRACE) 2 MG tablet Take 1 tablet by mouth daily      Magnesium 500 MG CAPS Take by mouth 2 (two) times a day        phentermine (ADIPEX-P) 37 5 MG tablet Take 1 tablet (37 5 mg total) by mouth every morning (before breakfast) 90 tablet 0     No current facility-administered medications for this visit         Objective:    /84   Pulse 76   Temp 98 °F (36 7 °C)   Resp 16   Ht 5' 7" (1 702 m)   Wt 84 4 kg (186 lb)   SpO2 99%   BMI 29 13 kg/m²        Physical Exam  Vitals and nursing note reviewed  Constitutional:       General: She is not in acute distress  Appearance: She is well-developed  She is not ill-appearing  HENT:      Head: Normocephalic  Right Ear: Tympanic membrane normal       Left Ear: Tympanic membrane normal    Eyes:      General: No scleral icterus  Conjunctiva/sclera: Conjunctivae normal    Cardiovascular:      Rate and Rhythm: Normal rate and regular rhythm  Heart sounds: No murmur heard  Pulmonary:      Effort: Pulmonary effort is normal  No respiratory distress  Abdominal:      Palpations: Abdomen is soft  Musculoskeletal:         General: No deformity  Cervical back: Neck supple  Skin:     General: Skin is warm and dry  Coloration: Skin is not pale  Neurological:      Mental Status: She is alert  Motor: No weakness  Gait: Gait normal    Psychiatric:         Behavior: Behavior normal          Thought Content:  Thought content normal                 Erin Watson,

## 2021-09-28 ENCOUNTER — OFFICE VISIT (OUTPATIENT)
Dept: FAMILY MEDICINE CLINIC | Facility: CLINIC | Age: 51
End: 2021-09-28
Payer: COMMERCIAL

## 2021-09-28 VITALS
DIASTOLIC BLOOD PRESSURE: 78 MMHG | HEART RATE: 74 BPM | HEIGHT: 67 IN | OXYGEN SATURATION: 99 % | BODY MASS INDEX: 28.56 KG/M2 | SYSTOLIC BLOOD PRESSURE: 118 MMHG | WEIGHT: 182 LBS | RESPIRATION RATE: 16 BRPM | TEMPERATURE: 97.6 F

## 2021-09-28 DIAGNOSIS — Z23 IMMUNIZATION DUE: ICD-10-CM

## 2021-09-28 DIAGNOSIS — E66.3 OVERWEIGHT: Primary | ICD-10-CM

## 2021-09-28 PROCEDURE — 90750 HZV VACC RECOMBINANT IM: CPT

## 2021-09-28 PROCEDURE — 3008F BODY MASS INDEX DOCD: CPT | Performed by: FAMILY MEDICINE

## 2021-09-28 PROCEDURE — 1036F TOBACCO NON-USER: CPT | Performed by: FAMILY MEDICINE

## 2021-09-28 PROCEDURE — 99213 OFFICE O/P EST LOW 20 MIN: CPT | Performed by: FAMILY MEDICINE

## 2021-09-28 PROCEDURE — 90472 IMMUNIZATION ADMIN EACH ADD: CPT

## 2021-09-28 PROCEDURE — 90682 RIV4 VACC RECOMBINANT DNA IM: CPT

## 2021-09-28 PROCEDURE — 90471 IMMUNIZATION ADMIN: CPT

## 2021-09-28 RX ORDER — PHENTERMINE HYDROCHLORIDE 37.5 MG/1
37.5 TABLET ORAL EVERY MORNING
Qty: 90 TABLET | Refills: 0 | Status: SHIPPED | OUTPATIENT
Start: 2021-09-28 | End: 2021-12-30 | Stop reason: ALTCHOICE

## 2021-09-28 NOTE — PROGRESS NOTES
Assessment/Plan:    No problem-specific Assessment & Plan notes found for this encounter  bmi noted  Continue meds  F/u 1m    shingrix and flublok given     Diagnoses and all orders for this visit:    Overweight  -     phentermine (ADIPEX-P) 37 5 MG tablet; Take 1 tablet (37 5 mg total) by mouth every morning (before breakfast)    Immunization due  -     Zoster Vaccine Recombinant IM  -     influenza vaccine, quadrivalent, recombinant, PF, 0 5 mL, for patients 18 yr+ (FLUBLOK)    BMI 28 0-28 9,adult          Return in about 1 month (around 10/28/2021) for Recheck  Subjective:      Patient ID: Oscar Braun is a 46 y o  female  Chief Complaint   Patient presents with    Follow-up     on meds [id number]        HPI  Lost wt  On meds  Denies palpitations, chest pain, mood changes, irritability, WILHELM, edema or syncope  Use of phentermine as an adjunct to diet/exercise program aware  Accepts risks of medications as discussed  Did have lots of beer recently  Eating well  Exercising  Some hiking    The following portions of the patient's history were reviewed and updated as appropriate: allergies, current medications, past family history, past medical history, past social history, past surgical history and problem list     Review of Systems   Respiratory: Negative for shortness of breath  Cardiovascular: Negative for chest pain, palpitations and leg swelling           Current Outpatient Medications   Medication Sig Dispense Refill    aspirin 81 mg chewable tablet Chew 1 tablet daily      calcium citrate-vitamin D (CITRACAL+D) 315-200 MG-UNIT per tablet Take 1 tablet by mouth daily      cholecalciferol (VITAMIN D3) 1,000 units tablet Take 5,000 tablets by mouth daily        estradiol (ESTRACE) 0 1 mg/g vaginal cream every 7 days      estradiol (ESTRACE) 2 MG tablet Take 1 tablet by mouth daily      Magnesium 500 MG CAPS Take by mouth 2 (two) times a day        phentermine (ADIPEX-P) 37 5 MG tablet Take 1 tablet (37 5 mg total) by mouth every morning (before breakfast) 90 tablet 0     No current facility-administered medications for this visit  Objective:    /78   Pulse 74   Temp 97 6 °F (36 4 °C)   Resp 16   Ht 5' 7" (1 702 m)   Wt 82 6 kg (182 lb)   SpO2 99%   BMI 28 51 kg/m²        Physical Exam  Vitals and nursing note reviewed  Constitutional:       General: She is not in acute distress  Appearance: She is well-developed  She is not ill-appearing  HENT:      Head: Normocephalic  Right Ear: Tympanic membrane normal       Left Ear: Tympanic membrane normal       Nose: No congestion  Mouth/Throat:      Pharynx: No oropharyngeal exudate  Eyes:      Conjunctiva/sclera: Conjunctivae normal    Cardiovascular:      Rate and Rhythm: Normal rate and regular rhythm  Heart sounds: Murmur heard  Pulmonary:      Effort: Pulmonary effort is normal  No respiratory distress  Breath sounds: No wheezing  Abdominal:      Palpations: Abdomen is soft  Tenderness: There is no abdominal tenderness  Musculoskeletal:         General: No deformity  Cervical back: Neck supple  Right lower leg: No edema  Left lower leg: No edema  Skin:     General: Skin is warm and dry  Coloration: Skin is not jaundiced or pale  Neurological:      Mental Status: She is alert  Psychiatric:         Mood and Affect: Mood normal          Behavior: Behavior normal          Thought Content:  Thought content normal                 Roni Land, DO

## 2021-12-30 ENCOUNTER — OFFICE VISIT (OUTPATIENT)
Dept: OTOLARYNGOLOGY | Facility: CLINIC | Age: 51
End: 2021-12-30
Payer: COMMERCIAL

## 2021-12-30 VITALS — BODY MASS INDEX: 29.03 KG/M2 | HEIGHT: 67 IN | TEMPERATURE: 98 F | WEIGHT: 185 LBS

## 2021-12-30 DIAGNOSIS — K21.9 LARYNGOPHARYNGEAL REFLUX (LPR): Primary | ICD-10-CM

## 2021-12-30 PROCEDURE — 3008F BODY MASS INDEX DOCD: CPT | Performed by: OTOLARYNGOLOGY

## 2021-12-30 PROCEDURE — 31575 DIAGNOSTIC LARYNGOSCOPY: CPT | Performed by: OTOLARYNGOLOGY

## 2021-12-30 PROCEDURE — 99204 OFFICE O/P NEW MOD 45 MIN: CPT | Performed by: OTOLARYNGOLOGY

## 2021-12-30 PROCEDURE — 1036F TOBACCO NON-USER: CPT | Performed by: OTOLARYNGOLOGY

## 2021-12-30 RX ORDER — OMEPRAZOLE 40 MG/1
40 CAPSULE, DELAYED RELEASE ORAL DAILY
Qty: 30 CAPSULE | Refills: 3 | Status: SHIPPED | OUTPATIENT
Start: 2021-12-30 | End: 2022-03-31 | Stop reason: SDUPTHER

## 2022-02-03 ENCOUNTER — OFFICE VISIT (OUTPATIENT)
Dept: OTOLARYNGOLOGY | Facility: CLINIC | Age: 52
End: 2022-02-03
Payer: COMMERCIAL

## 2022-02-03 VITALS — BODY MASS INDEX: 28.25 KG/M2 | TEMPERATURE: 97.6 F | HEIGHT: 67 IN | WEIGHT: 180 LBS

## 2022-02-03 DIAGNOSIS — K21.9 LARYNGOPHARYNGEAL REFLUX (LPR): Primary | ICD-10-CM

## 2022-02-03 PROCEDURE — 99213 OFFICE O/P EST LOW 20 MIN: CPT | Performed by: OTOLARYNGOLOGY

## 2022-02-03 PROCEDURE — 1036F TOBACCO NON-USER: CPT | Performed by: OTOLARYNGOLOGY

## 2022-02-03 RX ORDER — FAMOTIDINE 40 MG/1
40 TABLET, FILM COATED ORAL
Qty: 30 TABLET | Refills: 3 | Status: SHIPPED | OUTPATIENT
Start: 2022-02-03 | End: 2022-03-31 | Stop reason: SDUPTHER

## 2022-02-03 NOTE — PROGRESS NOTES
Otolaryngology-- Head and Neck Surgery Follow up visit      CC: globus      Time interval of problem since last visit:  6 weeks    Pertinent interval elements of the history:  She returns after 6 weeks without improvement in morning globus sensation despite PPI therapy  Symptoms on the right side of her throat, despite dietary changes, including decaf coffee, less spicy and acidic foods  Review of any relevant imaging:      Interval Review of systems: Pertinent review of systems documented in the HPI  Interval Social History:  Social History     Socioeconomic History    Marital status: /Civil Union     Spouse name: Not on file    Number of children: Not on file    Years of education: Not on file    Highest education level: Not on file   Occupational History    Not on file   Tobacco Use    Smoking status: Former Smoker     Quit date:      Years since quittin 1    Smokeless tobacco: Never Used   Vaping Use    Vaping Use: Never used   Substance and Sexual Activity    Alcohol use: Yes    Drug use: No    Sexual activity: Not on file   Other Topics Concern    Not on file   Social History Narrative    Former smoker - As per [facility]      Social Determinants of Health     Financial Resource Strain: Not on file   Food Insecurity: Not on file   Transportation Needs: Not on file   Physical Activity: Not on file   Stress: Not on file   Social Connections: Not on file   Intimate Partner Violence: Not on file   Housing Stability: Not on file       Interval Physical Examination:  Temp 97 6 °F (36 4 °C) (Temporal)   Ht 5' 7" (1 702 m)   Wt 81 6 kg (180 lb)   BMI 28 19 kg/m²   NAD      Procedure:      Assessment:  1  Laryngopharyngeal reflux (LPR)  famotidine (PEPCID) 40 MG tablet       Plan:  1  Despite PPI therapy, her symptoms have been persistent  Thus, I asked that she continue Omeprazole in the morning and also add Pepcid to her regimen at night  Follow up PRN in 6 weeks

## 2022-02-08 ENCOUNTER — ANESTHESIA EVENT (EMERGENCY)
Dept: PERIOP | Facility: HOSPITAL | Age: 52
DRG: 329 | End: 2022-02-08
Payer: COMMERCIAL

## 2022-02-08 ENCOUNTER — ANESTHESIA (EMERGENCY)
Dept: PERIOP | Facility: HOSPITAL | Age: 52
DRG: 329 | End: 2022-02-08
Payer: COMMERCIAL

## 2022-02-08 ENCOUNTER — HOSPITAL ENCOUNTER (INPATIENT)
Facility: HOSPITAL | Age: 52
LOS: 7 days | Discharge: HOME | DRG: 329 | End: 2022-02-15
Attending: EMERGENCY MEDICINE | Admitting: SURGERY
Payer: COMMERCIAL

## 2022-02-08 ENCOUNTER — APPOINTMENT (EMERGENCY)
Dept: RADIOLOGY | Facility: HOSPITAL | Age: 52
DRG: 329 | End: 2022-02-08
Payer: COMMERCIAL

## 2022-02-08 ENCOUNTER — APPOINTMENT (EMERGENCY)
Dept: CT IMAGING | Facility: HOSPITAL | Age: 52
DRG: 329 | End: 2022-02-08
Payer: COMMERCIAL

## 2022-02-08 DIAGNOSIS — K55.069 MESENTERIC VENOUS THROMBOSIS (HCC): ICD-10-CM
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
## 2022-02-08 DIAGNOSIS — K55.9 SMALL BOWEL ISCHEMIA (HCC): ICD-10-CM
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
## 2022-02-08 PROBLEM — K92.2 GI BLEEDING: Status: ACTIVE | Noted: 2022-02-08

## 2022-02-08 LAB
ABO GROUP BLD: NORMAL
ABO GROUP BLD: NORMAL
ALBUMIN SERPL BCP-MCNC: 3.7 G/DL (ref 3.5–5)
ALP SERPL-CCNC: 68 U/L (ref 46–116)
ALT SERPL W P-5'-P-CCNC: 35 U/L (ref 12–78)
ANION GAP SERPL CALCULATED.3IONS-SCNC: 11 MMOL/L (ref 4–13)
ANION GAP SERPL CALCULATED.3IONS-SCNC: 8 MMOL/L (ref 4–13)
APTT PPP: 24 SECONDS (ref 23–37)
AST SERPL W P-5'-P-CCNC: 32 U/L (ref 5–45)
BASE EXCESS BLDA CALC-SCNC: 0 MMOL/L (ref -2–3)
BASOPHILS # BLD AUTO: 0.03 THOUSANDS/ΜL (ref 0–0.1)
BASOPHILS # BLD AUTO: 0.05 THOUSANDS/ΜL (ref 0–0.1)
BASOPHILS NFR BLD AUTO: 0 % (ref 0–1)
BASOPHILS NFR BLD AUTO: 0 % (ref 0–1)
BILIRUB SERPL-MCNC: 0.34 MG/DL (ref 0.2–1)
BLD GP AB SCN SERPL QL: NEGATIVE
BUN SERPL-MCNC: 17 MG/DL (ref 5–25)
BUN SERPL-MCNC: 19 MG/DL (ref 5–25)
CA-I BLD-SCNC: 1.1 MMOL/L (ref 1.12–1.32)
CALCIUM SERPL-MCNC: 7.9 MG/DL (ref 8.3–10.1)
CALCIUM SERPL-MCNC: 9.4 MG/DL (ref 8.3–10.1)
CARDIAC TROPONIN I PNL SERPL HS: <2 NG/L
CHLORIDE SERPL-SCNC: 103 MMOL/L (ref 100–108)
CHLORIDE SERPL-SCNC: 107 MMOL/L (ref 100–108)
CO2 SERPL-SCNC: 23 MMOL/L (ref 21–32)
CO2 SERPL-SCNC: 25 MMOL/L (ref 21–32)
CREAT SERPL-MCNC: 0.78 MG/DL (ref 0.6–1.3)
CREAT SERPL-MCNC: 0.87 MG/DL (ref 0.6–1.3)
EOSINOPHIL # BLD AUTO: 0.01 THOUSAND/ΜL (ref 0–0.61)
EOSINOPHIL # BLD AUTO: 0.29 THOUSAND/ΜL (ref 0–0.61)
EOSINOPHIL NFR BLD AUTO: 0 % (ref 0–6)
EOSINOPHIL NFR BLD AUTO: 2 % (ref 0–6)
ERYTHROCYTE [DISTWIDTH] IN BLOOD BY AUTOMATED COUNT: 13.2 % (ref 11.6–15.1)
ERYTHROCYTE [DISTWIDTH] IN BLOOD BY AUTOMATED COUNT: 13.3 % (ref 11.6–15.1)
GFR SERPL CREATININE-BSD FRML MDRD: 76 ML/MIN/1.73SQ M
GFR SERPL CREATININE-BSD FRML MDRD: 87 ML/MIN/1.73SQ M
GLUCOSE SERPL-MCNC: 138 MG/DL (ref 65–140)
GLUCOSE SERPL-MCNC: 146 MG/DL (ref 65–140)
GLUCOSE SERPL-MCNC: 173 MG/DL (ref 65–140)
HCO3 BLDA-SCNC: 23.9 MMOL/L (ref 24–30)
HCT VFR BLD AUTO: 27.7 % (ref 34.8–46.1)
HCT VFR BLD AUTO: 37.8 % (ref 34.8–46.1)
HCT VFR BLD CALC: 37 % (ref 34.8–46.1)
HGB BLD-MCNC: 12.4 G/DL (ref 11.5–15.4)
HGB BLD-MCNC: 9.1 G/DL (ref 11.5–15.4)
HGB BLDA-MCNC: 12.6 G/DL (ref 11.5–15.4)
IMM GRANULOCYTES # BLD AUTO: 0.11 THOUSAND/UL (ref 0–0.2)
IMM GRANULOCYTES # BLD AUTO: 0.13 THOUSAND/UL (ref 0–0.2)
IMM GRANULOCYTES NFR BLD AUTO: 1 % (ref 0–2)
IMM GRANULOCYTES NFR BLD AUTO: 1 % (ref 0–2)
INR PPP: 1.04 (ref 0.84–1.19)
LYMPHOCYTES # BLD AUTO: 0.68 THOUSANDS/ΜL (ref 0.6–4.47)
LYMPHOCYTES # BLD AUTO: 1.62 THOUSANDS/ΜL (ref 0.6–4.47)
LYMPHOCYTES NFR BLD AUTO: 14 % (ref 14–44)
LYMPHOCYTES NFR BLD AUTO: 5 % (ref 14–44)
MAGNESIUM SERPL-MCNC: 1.7 MG/DL (ref 1.6–2.6)
MCH RBC QN AUTO: 28.5 PG (ref 26.8–34.3)
MCH RBC QN AUTO: 29.4 PG (ref 26.8–34.3)
MCHC RBC AUTO-ENTMCNC: 32.8 G/DL (ref 31.4–37.4)
MCHC RBC AUTO-ENTMCNC: 32.9 G/DL (ref 31.4–37.4)
MCV RBC AUTO: 87 FL (ref 82–98)
MCV RBC AUTO: 90 FL (ref 82–98)
MONOCYTES # BLD AUTO: 0.57 THOUSAND/ΜL (ref 0.17–1.22)
MONOCYTES # BLD AUTO: 0.69 THOUSAND/ΜL (ref 0.17–1.22)
MONOCYTES NFR BLD AUTO: 5 % (ref 4–12)
MONOCYTES NFR BLD AUTO: 6 % (ref 4–12)
NEUTROPHILS # BLD AUTO: 11.09 THOUSANDS/ΜL (ref 1.85–7.62)
NEUTROPHILS # BLD AUTO: 9.27 THOUSANDS/ΜL (ref 1.85–7.62)
NEUTS SEG NFR BLD AUTO: 78 % (ref 43–75)
NEUTS SEG NFR BLD AUTO: 88 % (ref 43–75)
NRBC BLD AUTO-RTO: 0 /100 WBCS
NRBC BLD AUTO-RTO: 0 /100 WBCS
PCO2 BLD: 25 MMOL/L (ref 21–32)
PCO2 BLD: 34.2 MM HG (ref 42–50)
PH BLD: 7.45 [PH] (ref 7.3–7.4)
PHOSPHATE SERPL-MCNC: 3.2 MG/DL (ref 2.7–4.5)
PLATELET # BLD AUTO: 213 THOUSANDS/UL (ref 149–390)
PLATELET # BLD AUTO: 273 THOUSANDS/UL (ref 149–390)
PMV BLD AUTO: 10.9 FL (ref 8.9–12.7)
PMV BLD AUTO: 11.5 FL (ref 8.9–12.7)
PO2 BLD: 71 MM HG (ref 35–45)
POTASSIUM BLD-SCNC: 3.9 MMOL/L (ref 3.5–5.3)
POTASSIUM SERPL-SCNC: 3.8 MMOL/L (ref 3.5–5.3)
POTASSIUM SERPL-SCNC: 3.8 MMOL/L (ref 3.5–5.3)
PROT SERPL-MCNC: 7.3 G/DL (ref 6.4–8.2)
PROTHROMBIN TIME: 13.6 SECONDS (ref 11.6–14.5)
RBC # BLD AUTO: 3.09 MILLION/UL (ref 3.81–5.12)
RBC # BLD AUTO: 4.35 MILLION/UL (ref 3.81–5.12)
RH BLD: POSITIVE
RH BLD: POSITIVE
SAO2 % BLD FROM PO2: 95 % (ref 60–85)
SODIUM BLD-SCNC: 138 MMOL/L (ref 136–145)
SODIUM SERPL-SCNC: 137 MMOL/L (ref 136–145)
SODIUM SERPL-SCNC: 140 MMOL/L (ref 136–145)
SPECIMEN EXPIRATION DATE: NORMAL
SPECIMEN SOURCE: ABNORMAL
WBC # BLD AUTO: 11.91 THOUSAND/UL (ref 4.31–10.16)
WBC # BLD AUTO: 12.63 THOUSAND/UL (ref 4.31–10.16)

## 2022-02-08 PROCEDURE — NC001 PR NO CHARGE: Performed by: NURSE PRACTITIONER

## 2022-02-08 PROCEDURE — 85025 COMPLETE CBC W/AUTO DIFF WBC: CPT | Performed by: STUDENT IN AN ORGANIZED HEALTH CARE EDUCATION/TRAINING PROGRAM

## 2022-02-08 PROCEDURE — 0DBA0ZZ EXCISION OF JEJUNUM, OPEN APPROACH: ICD-10-PCS | Performed by: SURGERY

## 2022-02-08 PROCEDURE — NC001 PR NO CHARGE: Performed by: EMERGENCY MEDICINE

## 2022-02-08 PROCEDURE — 85025 COMPLETE CBC W/AUTO DIFF WBC: CPT | Performed by: SURGERY

## 2022-02-08 PROCEDURE — 96375 TX/PRO/DX INJ NEW DRUG ADDON: CPT

## 2022-02-08 PROCEDURE — 82330 ASSAY OF CALCIUM: CPT | Performed by: STUDENT IN AN ORGANIZED HEALTH CARE EDUCATION/TRAINING PROGRAM

## 2022-02-08 PROCEDURE — 84295 ASSAY OF SERUM SODIUM: CPT

## 2022-02-08 PROCEDURE — 85014 HEMATOCRIT: CPT

## 2022-02-08 PROCEDURE — 76705 ECHO EXAM OF ABDOMEN: CPT | Performed by: SURGERY

## 2022-02-08 PROCEDURE — 85610 PROTHROMBIN TIME: CPT | Performed by: SURGERY

## 2022-02-08 PROCEDURE — 70450 CT HEAD/BRAIN W/O DYE: CPT

## 2022-02-08 PROCEDURE — 99285 EMERGENCY DEPT VISIT HI MDM: CPT

## 2022-02-08 PROCEDURE — 88307 TISSUE EXAM BY PATHOLOGIST: CPT | Performed by: PATHOLOGY

## 2022-02-08 PROCEDURE — 74177 CT ABD & PELVIS W/CONTRAST: CPT

## 2022-02-08 PROCEDURE — 84100 ASSAY OF PHOSPHORUS: CPT | Performed by: STUDENT IN AN ORGANIZED HEALTH CARE EDUCATION/TRAINING PROGRAM

## 2022-02-08 PROCEDURE — 85730 THROMBOPLASTIN TIME PARTIAL: CPT | Performed by: SURGERY

## 2022-02-08 PROCEDURE — 72125 CT NECK SPINE W/O DYE: CPT

## 2022-02-08 PROCEDURE — 44120 REMOVAL OF SMALL INTESTINE: CPT | Performed by: SURGERY

## 2022-02-08 PROCEDURE — 84484 ASSAY OF TROPONIN QUANT: CPT | Performed by: STUDENT IN AN ORGANIZED HEALTH CARE EDUCATION/TRAINING PROGRAM

## 2022-02-08 PROCEDURE — 84132 ASSAY OF SERUM POTASSIUM: CPT

## 2022-02-08 PROCEDURE — 86900 BLOOD TYPING SEROLOGIC ABO: CPT | Performed by: EMERGENCY MEDICINE

## 2022-02-08 PROCEDURE — 71260 CT THORAX DX C+: CPT

## 2022-02-08 PROCEDURE — 71045 X-RAY EXAM CHEST 1 VIEW: CPT

## 2022-02-08 PROCEDURE — 86901 BLOOD TYPING SEROLOGIC RH(D): CPT | Performed by: EMERGENCY MEDICINE

## 2022-02-08 PROCEDURE — 96374 THER/PROPH/DIAG INJ IV PUSH: CPT

## 2022-02-08 PROCEDURE — G1004 CDSM NDSC: HCPCS

## 2022-02-08 PROCEDURE — 80053 COMPREHEN METABOLIC PANEL: CPT | Performed by: SURGERY

## 2022-02-08 PROCEDURE — 80048 BASIC METABOLIC PNL TOTAL CA: CPT | Performed by: STUDENT IN AN ORGANIZED HEALTH CARE EDUCATION/TRAINING PROGRAM

## 2022-02-08 PROCEDURE — 93005 ELECTROCARDIOGRAM TRACING: CPT

## 2022-02-08 PROCEDURE — 83735 ASSAY OF MAGNESIUM: CPT | Performed by: STUDENT IN AN ORGANIZED HEALTH CARE EDUCATION/TRAINING PROGRAM

## 2022-02-08 PROCEDURE — 93308 TTE F-UP OR LMTD: CPT | Performed by: SURGERY

## 2022-02-08 PROCEDURE — 86850 RBC ANTIBODY SCREEN: CPT | Performed by: EMERGENCY MEDICINE

## 2022-02-08 PROCEDURE — 0DBB0ZZ EXCISION OF ILEUM, OPEN APPROACH: ICD-10-PCS | Performed by: SURGERY

## 2022-02-08 PROCEDURE — 36415 COLL VENOUS BLD VENIPUNCTURE: CPT | Performed by: EMERGENCY MEDICINE

## 2022-02-08 PROCEDURE — 84484 ASSAY OF TROPONIN QUANT: CPT | Performed by: EMERGENCY MEDICINE

## 2022-02-08 PROCEDURE — 82803 BLOOD GASES ANY COMBINATION: CPT

## 2022-02-08 PROCEDURE — 82947 ASSAY GLUCOSE BLOOD QUANT: CPT

## 2022-02-08 PROCEDURE — 99223 1ST HOSP IP/OBS HIGH 75: CPT | Performed by: SURGERY

## 2022-02-08 RX ORDER — PROPOFOL 10 MG/ML
INJECTION, EMULSION INTRAVENOUS AS NEEDED
Status: DISCONTINUED | OUTPATIENT
Start: 2022-02-08 | End: 2022-02-08

## 2022-02-08 RX ORDER — ONDANSETRON 2 MG/ML
4 INJECTION INTRAMUSCULAR; INTRAVENOUS ONCE
Status: COMPLETED | OUTPATIENT
Start: 2022-02-08 | End: 2022-02-08

## 2022-02-08 RX ORDER — LIDOCAINE HYDROCHLORIDE 10 MG/ML
INJECTION, SOLUTION EPIDURAL; INFILTRATION; INTRACAUDAL; PERINEURAL AS NEEDED
Status: DISCONTINUED | OUTPATIENT
Start: 2022-02-08 | End: 2022-02-08

## 2022-02-08 RX ORDER — SUCCINYLCHOLINE/SOD CL,ISO/PF 100 MG/5ML
SYRINGE (ML) INTRAVENOUS AS NEEDED
Status: DISCONTINUED | OUTPATIENT
Start: 2022-02-08 | End: 2022-02-08

## 2022-02-08 RX ORDER — HYDROMORPHONE HCL/PF 1 MG/ML
SYRINGE (ML) INJECTION AS NEEDED
Status: DISCONTINUED | OUTPATIENT
Start: 2022-02-08 | End: 2022-02-08

## 2022-02-08 RX ORDER — FENTANYL CITRATE/PF 50 MCG/ML
50 SYRINGE (ML) INJECTION
Status: DISCONTINUED | OUTPATIENT
Start: 2022-02-08 | End: 2022-02-08

## 2022-02-08 RX ORDER — MAGNESIUM HYDROXIDE 1200 MG/15ML
LIQUID ORAL AS NEEDED
Status: DISCONTINUED | OUTPATIENT
Start: 2022-02-08 | End: 2022-02-08 | Stop reason: HOSPADM

## 2022-02-08 RX ORDER — ONDANSETRON 2 MG/ML
INJECTION INTRAMUSCULAR; INTRAVENOUS AS NEEDED
Status: DISCONTINUED | OUTPATIENT
Start: 2022-02-08 | End: 2022-02-08

## 2022-02-08 RX ORDER — GLYCOPYRROLATE 0.2 MG/ML
INJECTION INTRAMUSCULAR; INTRAVENOUS AS NEEDED
Status: DISCONTINUED | OUTPATIENT
Start: 2022-02-08 | End: 2022-02-08

## 2022-02-08 RX ORDER — SODIUM CHLORIDE 9 MG/ML
INJECTION, SOLUTION INTRAVENOUS CONTINUOUS PRN
Status: DISCONTINUED | OUTPATIENT
Start: 2022-02-08 | End: 2022-02-08

## 2022-02-08 RX ORDER — HYDROMORPHONE HCL/PF 1 MG/ML
0.5 SYRINGE (ML) INJECTION
Status: COMPLETED | OUTPATIENT
Start: 2022-02-08 | End: 2022-02-08

## 2022-02-08 RX ORDER — FENTANYL CITRATE 50 UG/ML
50 INJECTION, SOLUTION INTRAMUSCULAR; INTRAVENOUS ONCE
Status: COMPLETED | OUTPATIENT
Start: 2022-02-08 | End: 2022-02-08

## 2022-02-08 RX ORDER — CALCIUM GLUCONATE 20 MG/ML
1 INJECTION, SOLUTION INTRAVENOUS ONCE
Status: COMPLETED | OUTPATIENT
Start: 2022-02-09 | End: 2022-02-09

## 2022-02-08 RX ORDER — FENTANYL CITRATE 50 UG/ML
50 INJECTION, SOLUTION INTRAMUSCULAR; INTRAVENOUS
Status: DISCONTINUED | OUTPATIENT
Start: 2022-02-08 | End: 2022-02-09

## 2022-02-08 RX ORDER — CEFAZOLIN SODIUM 1 G/3ML
INJECTION, POWDER, FOR SOLUTION INTRAMUSCULAR; INTRAVENOUS AS NEEDED
Status: DISCONTINUED | OUTPATIENT
Start: 2022-02-08 | End: 2022-02-08

## 2022-02-08 RX ORDER — MAGNESIUM SULFATE HEPTAHYDRATE 40 MG/ML
2 INJECTION, SOLUTION INTRAVENOUS ONCE
Status: COMPLETED | OUTPATIENT
Start: 2022-02-09 | End: 2022-02-09

## 2022-02-08 RX ORDER — ONDANSETRON 2 MG/ML
4 INJECTION INTRAMUSCULAR; INTRAVENOUS ONCE AS NEEDED
Status: COMPLETED | OUTPATIENT
Start: 2022-02-08 | End: 2022-02-08

## 2022-02-08 RX ORDER — FENTANYL CITRATE 50 UG/ML
INJECTION, SOLUTION INTRAMUSCULAR; INTRAVENOUS AS NEEDED
Status: DISCONTINUED | OUTPATIENT
Start: 2022-02-08 | End: 2022-02-08

## 2022-02-08 RX ORDER — NEOSTIGMINE METHYLSULFATE 1 MG/ML
INJECTION INTRAVENOUS AS NEEDED
Status: DISCONTINUED | OUTPATIENT
Start: 2022-02-08 | End: 2022-02-08

## 2022-02-08 RX ORDER — MIDAZOLAM HYDROCHLORIDE 2 MG/2ML
INJECTION, SOLUTION INTRAMUSCULAR; INTRAVENOUS AS NEEDED
Status: DISCONTINUED | OUTPATIENT
Start: 2022-02-08 | End: 2022-02-08

## 2022-02-08 RX ORDER — ALBUMIN, HUMAN INJ 5% 5 %
SOLUTION INTRAVENOUS CONTINUOUS PRN
Status: DISCONTINUED | OUTPATIENT
Start: 2022-02-08 | End: 2022-02-08

## 2022-02-08 RX ORDER — KETOROLAC TROMETHAMINE 30 MG/ML
30 INJECTION, SOLUTION INTRAMUSCULAR; INTRAVENOUS ONCE
Status: COMPLETED | OUTPATIENT
Start: 2022-02-08 | End: 2022-02-08

## 2022-02-08 RX ORDER — ROCURONIUM BROMIDE 10 MG/ML
INJECTION, SOLUTION INTRAVENOUS AS NEEDED
Status: DISCONTINUED | OUTPATIENT
Start: 2022-02-08 | End: 2022-02-08

## 2022-02-08 RX ORDER — EPHEDRINE SULFATE 50 MG/ML
INJECTION INTRAVENOUS AS NEEDED
Status: DISCONTINUED | OUTPATIENT
Start: 2022-02-08 | End: 2022-02-08

## 2022-02-08 RX ORDER — SODIUM CHLORIDE, SODIUM LACTATE, POTASSIUM CHLORIDE, CALCIUM CHLORIDE 600; 310; 30; 20 MG/100ML; MG/100ML; MG/100ML; MG/100ML
INJECTION, SOLUTION INTRAVENOUS CONTINUOUS PRN
Status: DISCONTINUED | OUTPATIENT
Start: 2022-02-08 | End: 2022-02-08

## 2022-02-08 RX ORDER — HYDROMORPHONE HCL IN WATER/PF 6 MG/30 ML
0.2 PATIENT CONTROLLED ANALGESIA SYRINGE INTRAVENOUS
Status: DISCONTINUED | OUTPATIENT
Start: 2022-02-08 | End: 2022-02-08

## 2022-02-08 RX ADMIN — HYDROMORPHONE HYDROCHLORIDE 0.5 MG: 1 INJECTION, SOLUTION INTRAMUSCULAR; INTRAVENOUS; SUBCUTANEOUS at 21:50

## 2022-02-08 RX ADMIN — CEFAZOLIN SODIUM 2000 MG: 1 INJECTION, POWDER, FOR SOLUTION INTRAMUSCULAR; INTRAVENOUS at 19:28

## 2022-02-08 RX ADMIN — PROPOFOL 200 MG: 10 INJECTION, EMULSION INTRAVENOUS at 19:21

## 2022-02-08 RX ADMIN — EPHEDRINE SULFATE 10 MG: 50 INJECTION, SOLUTION INTRAVENOUS at 21:00

## 2022-02-08 RX ADMIN — ROCURONIUM BROMIDE 50 MG: 10 SOLUTION INTRAVENOUS at 19:30

## 2022-02-08 RX ADMIN — SODIUM CHLORIDE, SODIUM LACTATE, POTASSIUM CHLORIDE, AND CALCIUM CHLORIDE: .6; .31; .03; .02 INJECTION, SOLUTION INTRAVENOUS at 19:15

## 2022-02-08 RX ADMIN — ONDANSETRON 4 MG: 2 INJECTION INTRAMUSCULAR; INTRAVENOUS at 21:57

## 2022-02-08 RX ADMIN — MIDAZOLAM HYDROCHLORIDE 2 MG: 1 INJECTION, SOLUTION INTRAMUSCULAR; INTRAVENOUS at 19:14

## 2022-02-08 RX ADMIN — DESMOPRESSIN ACETATE 28.8 MCG: 4 SOLUTION INTRAVENOUS at 19:10

## 2022-02-08 RX ADMIN — HYDROMORPHONE HYDROCHLORIDE 0.5 MG: 1 INJECTION, SOLUTION INTRAMUSCULAR; INTRAVENOUS; SUBCUTANEOUS at 21:34

## 2022-02-08 RX ADMIN — SODIUM CHLORIDE: 0.9 INJECTION, SOLUTION INTRAVENOUS at 19:24

## 2022-02-08 RX ADMIN — HYDROMORPHONE HYDROCHLORIDE 0.5 MG: 1 INJECTION, SOLUTION INTRAMUSCULAR; INTRAVENOUS; SUBCUTANEOUS at 21:55

## 2022-02-08 RX ADMIN — FENTANYL CITRATE 50 MCG: 50 INJECTION, SOLUTION INTRAMUSCULAR; INTRAVENOUS at 19:21

## 2022-02-08 RX ADMIN — IOHEXOL 100 ML: 350 INJECTION, SOLUTION INTRAVENOUS at 18:12

## 2022-02-08 RX ADMIN — Medication 100 MG: at 19:21

## 2022-02-08 RX ADMIN — ALBUMIN HUMAN: 0.05 INJECTION, SOLUTION INTRAVENOUS at 19:52

## 2022-02-08 RX ADMIN — MORPHINE SULFATE 5 MG: 2 INJECTION, SOLUTION INTRAMUSCULAR; INTRAVENOUS at 22:50

## 2022-02-08 RX ADMIN — ONDANSETRON 4 MG: 2 INJECTION INTRAMUSCULAR; INTRAVENOUS at 17:50

## 2022-02-08 RX ADMIN — HYDROMORPHONE HYDROCHLORIDE: 10 INJECTION, SOLUTION INTRAMUSCULAR; INTRAVENOUS; SUBCUTANEOUS at 23:28

## 2022-02-08 RX ADMIN — LIDOCAINE HYDROCHLORIDE 100 MG: 10 INJECTION, SOLUTION EPIDURAL; INFILTRATION; INTRACAUDAL at 19:21

## 2022-02-08 RX ADMIN — ALBUMIN HUMAN: 0.05 INJECTION, SOLUTION INTRAVENOUS at 19:38

## 2022-02-08 RX ADMIN — NEOSTIGMINE METHYLSULFATE 3 MG: 1 INJECTION INTRAVENOUS at 21:11

## 2022-02-08 RX ADMIN — FENTANYL CITRATE 50 MCG: 50 INJECTION, SOLUTION INTRAMUSCULAR; INTRAVENOUS at 19:57

## 2022-02-08 RX ADMIN — GLYCOPYRROLATE 0.4 MG: 0.2 INJECTION, SOLUTION INTRAMUSCULAR; INTRAVENOUS at 21:11

## 2022-02-08 RX ADMIN — ONDANSETRON 4 MG: 2 INJECTION INTRAMUSCULAR; INTRAVENOUS at 19:30

## 2022-02-08 RX ADMIN — HYDROMORPHONE HYDROCHLORIDE 0.5 MG: 1 INJECTION, SOLUTION INTRAMUSCULAR; INTRAVENOUS; SUBCUTANEOUS at 20:08

## 2022-02-08 RX ADMIN — HYDROMORPHONE HYDROCHLORIDE 0.5 MG: 1 INJECTION, SOLUTION INTRAMUSCULAR; INTRAVENOUS; SUBCUTANEOUS at 21:40

## 2022-02-08 RX ADMIN — EPHEDRINE SULFATE 5 MG: 50 INJECTION, SOLUTION INTRAVENOUS at 19:21

## 2022-02-08 RX ADMIN — FENTANYL CITRATE 50 MCG: 50 INJECTION INTRAMUSCULAR; INTRAVENOUS at 18:39

## 2022-02-08 RX ADMIN — KETOROLAC TROMETHAMINE 30 MG: 30 INJECTION, SOLUTION INTRAMUSCULAR at 22:59

## 2022-02-08 NOTE — PROCEDURES
POC FAST US    Date/Time: 2/8/2022 6:18 PM  Performed by: Martinez Molina PA-C  Authorized by: Martinez Molina PA-C     Patient location:  ED and Trauma  Procedure details:     Exam Type:  Diagnostic    Indications: blunt abdominal trauma and blunt chest trauma      Assess for:  Intra-abdominal fluid, pericardial effusion and pneumothorax    Technique: extended FAST      Views obtained:  Heart - Pericardial sac, RUQ - Mcdonough's Pouch, LUQ - Splenorenal space and Suprapubic - Pouch of Kevin    Image quality: diagnostic      Image availability:  Images available in PACS  FAST Findings:     RUQ (Hepatorenal) free fluid: large      LUQ (Splenorenal) free fluid: trace      Suprapubic free fluid: absent      Cardiac wall motion: identified      Pericardial effusion: absent    extended FAST (Pulmonary) findings:     Left lung sliding: Present      Right lung sliding: Present    Interpretation:     Impressions: positive
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
## 2022-02-08 NOTE — ED NOTES
Daughter Niya Kincaid) called for update on mother and would like further contact with updates as they result  She can be reached at [phone]       Renetta Whitman RN  02/08/22 [id number]

## 2022-02-08 NOTE — LETTER
[address] [address]  Dept: [phone]    February 15, 2022     Patient: Lavena Labor   YOB: 1970   Date of Visit: 2/8/2022       To Whom it May Concern:    Larry Scales is under my professional care  She was seen in the hospital from 2/8/2022   to 02/15/22  She is not cleared to return to work at this time  She will have follow-up in approximately 1 week at which point we will determine whether or not patient is appropriate for return to work clearance  Please allow patient appropriately recover at this time  If you have any questions or concerns, please don't hesitate to call           Sincerely,          Valarie Woodard PA-C

## 2022-02-08 NOTE — ED NOTES
Daughter calling for updates, was told that Evelio De Guzman (patients ) was point of contact/healthcare proxy, and all questions, concerns, and updates will be with him       April Lisa Nelson RN  02/08/22 [id number]

## 2022-02-09 ENCOUNTER — APPOINTMENT (INPATIENT)
Dept: RADIOLOGY | Facility: HOSPITAL | Age: 52
DRG: 329 | End: 2022-02-09
Payer: COMMERCIAL

## 2022-02-09 PROBLEM — R52 ACUTE PAIN: Status: ACTIVE | Noted: 2022-02-09

## 2022-02-09 PROBLEM — K21.9 LARYNGOPHARYNGEAL REFLUX (LPR): Status: ACTIVE | Noted: 2022-02-09

## 2022-02-09 PROBLEM — G89.18 ACUTE POST-OPERATIVE PAIN: Status: ACTIVE | Noted: 2022-02-09

## 2022-02-09 PROBLEM — K55.069 MESENTERIC VENOUS THROMBOSIS (HCC): Status: ACTIVE | Noted: 2022-02-09

## 2022-02-09 PROBLEM — D64.9 ANEMIA: Status: ACTIVE | Noted: 2022-02-09

## 2022-02-09 PROBLEM — K55.9 SMALL BOWEL ISCHEMIA (HCC): Status: ACTIVE | Noted: 2022-02-09

## 2022-02-09 LAB
ANION GAP SERPL CALCULATED.3IONS-SCNC: 8 MMOL/L (ref 4–13)
ATRIAL RATE: 66 BPM
BASOPHILS # BLD AUTO: 0.01 THOUSANDS/ΜL (ref 0–0.1)
BASOPHILS NFR BLD AUTO: 0 % (ref 0–1)
BUN SERPL-MCNC: 12 MG/DL (ref 5–25)
CALCIUM SERPL-MCNC: 8.1 MG/DL (ref 8.3–10.1)
CHLORIDE SERPL-SCNC: 106 MMOL/L (ref 100–108)
CO2 SERPL-SCNC: 24 MMOL/L (ref 21–32)
CREAT SERPL-MCNC: 0.75 MG/DL (ref 0.6–1.3)
EOSINOPHIL # BLD AUTO: 0 THOUSAND/ΜL (ref 0–0.61)
EOSINOPHIL NFR BLD AUTO: 0 % (ref 0–6)
ERYTHROCYTE [DISTWIDTH] IN BLOOD BY AUTOMATED COUNT: 13.2 % (ref 11.6–15.1)
GFR SERPL CREATININE-BSD FRML MDRD: 91 ML/MIN/1.73SQ M
GLUCOSE SERPL-MCNC: 133 MG/DL (ref 65–140)
HCT VFR BLD AUTO: 22.8 % (ref 34.8–46.1)
HCT VFR BLD AUTO: 22.9 % (ref 34.8–46.1)
HCT VFR BLD AUTO: 25.8 % (ref 34.8–46.1)
HCT VFR BLD AUTO: 27.7 % (ref 34.8–46.1)
HGB BLD-MCNC: 7.2 G/DL (ref 11.5–15.4)
HGB BLD-MCNC: 7.4 G/DL (ref 11.5–15.4)
HGB BLD-MCNC: 8.1 G/DL (ref 11.5–15.4)
HGB BLD-MCNC: 8.7 G/DL (ref 11.5–15.4)
IMM GRANULOCYTES # BLD AUTO: 0.04 THOUSAND/UL (ref 0–0.2)
IMM GRANULOCYTES NFR BLD AUTO: 0 % (ref 0–2)
INR PPP: 1.17 (ref 0.84–1.19)
LACTATE SERPL-SCNC: 1.5 MMOL/L (ref 0.5–2)
LACTATE SERPL-SCNC: 2.7 MMOL/L (ref 0.5–2)
LACTATE SERPL-SCNC: 3.7 MMOL/L (ref 0.5–2)
LYMPHOCYTES # BLD AUTO: 0.42 THOUSANDS/ΜL (ref 0.6–4.47)
LYMPHOCYTES NFR BLD AUTO: 4 % (ref 14–44)
MAGNESIUM SERPL-MCNC: 2.7 MG/DL (ref 1.6–2.6)
MCH RBC QN AUTO: 28.6 PG (ref 26.8–34.3)
MCHC RBC AUTO-ENTMCNC: 31.4 G/DL (ref 31.4–37.4)
MCV RBC AUTO: 91 FL (ref 82–98)
MONOCYTES # BLD AUTO: 0.53 THOUSAND/ΜL (ref 0.17–1.22)
MONOCYTES NFR BLD AUTO: 5 % (ref 4–12)
NEUTROPHILS # BLD AUTO: 8.81 THOUSANDS/ΜL (ref 1.85–7.62)
NEUTS SEG NFR BLD AUTO: 91 % (ref 43–75)
NRBC BLD AUTO-RTO: 0 /100 WBCS
P AXIS: 50 DEGREES
PLATELET # BLD AUTO: 159 THOUSANDS/UL (ref 149–390)
PLATELET # BLD AUTO: 185 THOUSANDS/UL (ref 149–390)
PMV BLD AUTO: 11.3 FL (ref 8.9–12.7)
PMV BLD AUTO: 11.5 FL (ref 8.9–12.7)
POTASSIUM SERPL-SCNC: 4.8 MMOL/L (ref 3.5–5.3)
PR INTERVAL: 136 MS
PROTHROMBIN TIME: 14.9 SECONDS (ref 11.6–14.5)
QRS AXIS: 74 DEGREES
QRSD INTERVAL: 86 MS
QT INTERVAL: 424 MS
QTC INTERVAL: 444 MS
RBC # BLD AUTO: 2.83 MILLION/UL (ref 3.81–5.12)
SODIUM SERPL-SCNC: 138 MMOL/L (ref 136–145)
T WAVE AXIS: 44 DEGREES
VENTRICULAR RATE: 66 BPM
WBC # BLD AUTO: 9.81 THOUSAND/UL (ref 4.31–10.16)

## 2022-02-09 PROCEDURE — 85018 HEMOGLOBIN: CPT | Performed by: NURSE PRACTITIONER

## 2022-02-09 PROCEDURE — 85610 PROTHROMBIN TIME: CPT | Performed by: NURSE PRACTITIONER

## 2022-02-09 PROCEDURE — 83605 ASSAY OF LACTIC ACID: CPT | Performed by: EMERGENCY MEDICINE

## 2022-02-09 PROCEDURE — 85049 AUTOMATED PLATELET COUNT: CPT | Performed by: STUDENT IN AN ORGANIZED HEALTH CARE EDUCATION/TRAINING PROGRAM

## 2022-02-09 PROCEDURE — 85025 COMPLETE CBC W/AUTO DIFF WBC: CPT | Performed by: STUDENT IN AN ORGANIZED HEALTH CARE EDUCATION/TRAINING PROGRAM

## 2022-02-09 PROCEDURE — 83605 ASSAY OF LACTIC ACID: CPT | Performed by: NURSE PRACTITIONER

## 2022-02-09 PROCEDURE — 73564 X-RAY EXAM KNEE 4 OR MORE: CPT

## 2022-02-09 PROCEDURE — 85014 HEMATOCRIT: CPT | Performed by: NURSE PRACTITIONER

## 2022-02-09 PROCEDURE — NC001 PR NO CHARGE: Performed by: STUDENT IN AN ORGANIZED HEALTH CARE EDUCATION/TRAINING PROGRAM

## 2022-02-09 PROCEDURE — 99232 SBSQ HOSP IP/OBS MODERATE 35: CPT | Performed by: SURGERY

## 2022-02-09 PROCEDURE — 93010 ELECTROCARDIOGRAM REPORT: CPT | Performed by: INTERNAL MEDICINE

## 2022-02-09 PROCEDURE — 83735 ASSAY OF MAGNESIUM: CPT | Performed by: STUDENT IN AN ORGANIZED HEALTH CARE EDUCATION/TRAINING PROGRAM

## 2022-02-09 PROCEDURE — 80048 BASIC METABOLIC PNL TOTAL CA: CPT | Performed by: STUDENT IN AN ORGANIZED HEALTH CARE EDUCATION/TRAINING PROGRAM

## 2022-02-09 RX ORDER — LIDOCAINE 50 MG/G
1 PATCH TOPICAL DAILY
Status: DISCONTINUED | OUTPATIENT
Start: 2022-02-09 | End: 2022-02-09

## 2022-02-09 RX ORDER — FAMOTIDINE 20 MG/1
20 TABLET, FILM COATED ORAL DAILY
Status: DISCONTINUED | OUTPATIENT
Start: 2022-02-09 | End: 2022-02-09

## 2022-02-09 RX ORDER — HYDROMORPHONE HCL/PF 1 MG/ML
0.5 SYRINGE (ML) INJECTION ONCE
Status: COMPLETED | OUTPATIENT
Start: 2022-02-09 | End: 2022-02-09

## 2022-02-09 RX ORDER — FENTANYL CITRATE 50 UG/ML
50 INJECTION, SOLUTION INTRAMUSCULAR; INTRAVENOUS
Status: DISCONTINUED | OUTPATIENT
Start: 2022-02-09 | End: 2022-02-09

## 2022-02-09 RX ORDER — SODIUM CHLORIDE, SODIUM GLUCONATE, SODIUM ACETATE, POTASSIUM CHLORIDE, MAGNESIUM CHLORIDE, SODIUM PHOSPHATE, DIBASIC, AND POTASSIUM PHOSPHATE .53; .5; .37; .037; .03; .012; .00082 G/100ML; G/100ML; G/100ML; G/100ML; G/100ML; G/100ML; G/100ML
50 INJECTION, SOLUTION INTRAVENOUS CONTINUOUS
Status: DISCONTINUED | OUTPATIENT
Start: 2022-02-09 | End: 2022-02-13

## 2022-02-09 RX ORDER — LIDOCAINE 50 MG/G
1 PATCH TOPICAL DAILY
Status: DISCONTINUED | OUTPATIENT
Start: 2022-02-09 | End: 2022-02-15 | Stop reason: HOSPADM

## 2022-02-09 RX ORDER — SODIUM CHLORIDE, SODIUM LACTATE, POTASSIUM CHLORIDE, CALCIUM CHLORIDE 600; 310; 30; 20 MG/100ML; MG/100ML; MG/100ML; MG/100ML
100 INJECTION, SOLUTION INTRAVENOUS CONTINUOUS
Status: DISCONTINUED | OUTPATIENT
Start: 2022-02-09 | End: 2022-02-09

## 2022-02-09 RX ORDER — ONDANSETRON 2 MG/ML
4 INJECTION INTRAMUSCULAR; INTRAVENOUS EVERY 4 HOURS PRN
Status: DISCONTINUED | OUTPATIENT
Start: 2022-02-09 | End: 2022-02-15 | Stop reason: HOSPADM

## 2022-02-09 RX ORDER — SODIUM CHLORIDE, SODIUM GLUCONATE, SODIUM ACETATE, POTASSIUM CHLORIDE, MAGNESIUM CHLORIDE, SODIUM PHOSPHATE, DIBASIC, AND POTASSIUM PHOSPHATE .53; .5; .37; .037; .03; .012; .00082 G/100ML; G/100ML; G/100ML; G/100ML; G/100ML; G/100ML; G/100ML
1000 INJECTION, SOLUTION INTRAVENOUS ONCE
Status: COMPLETED | OUTPATIENT
Start: 2022-02-09 | End: 2022-02-09

## 2022-02-09 RX ORDER — HEPARIN SODIUM 5000 [USP'U]/ML
5000 INJECTION, SOLUTION INTRAVENOUS; SUBCUTANEOUS EVERY 8 HOURS SCHEDULED
Status: DISCONTINUED | OUTPATIENT
Start: 2022-02-09 | End: 2022-02-09

## 2022-02-09 RX ORDER — CHLORHEXIDINE GLUCONATE 0.12 MG/ML
15 RINSE ORAL EVERY 12 HOURS SCHEDULED
Status: DISCONTINUED | OUTPATIENT
Start: 2022-02-09 | End: 2022-02-10

## 2022-02-09 RX ADMIN — CHLORHEXIDINE GLUCONATE 0.12% ORAL RINSE 15 ML: 1.2 LIQUID ORAL at 08:06

## 2022-02-09 RX ADMIN — SODIUM CHLORIDE, SODIUM GLUCONATE, SODIUM ACETATE, POTASSIUM CHLORIDE, MAGNESIUM CHLORIDE, SODIUM PHOSPHATE, DIBASIC, AND POTASSIUM PHOSPHATE 1000 ML: .53; .5; .37; .037; .03; .012; .00082 INJECTION, SOLUTION INTRAVENOUS at 02:20

## 2022-02-09 RX ADMIN — SODIUM CHLORIDE, SODIUM GLUCONATE, SODIUM ACETATE, POTASSIUM CHLORIDE, MAGNESIUM CHLORIDE, SODIUM PHOSPHATE, DIBASIC, AND POTASSIUM PHOSPHATE 125 ML/HR: .53; .5; .37; .037; .03; .012; .00082 INJECTION, SOLUTION INTRAVENOUS at 00:55

## 2022-02-09 RX ADMIN — CALCIUM GLUCONATE 1 G: 20 INJECTION, SOLUTION INTRAVENOUS at 00:49

## 2022-02-09 RX ADMIN — HYDROMORPHONE HYDROCHLORIDE 0.5 MG: 1 INJECTION, SOLUTION INTRAMUSCULAR; INTRAVENOUS; SUBCUTANEOUS at 11:24

## 2022-02-09 RX ADMIN — LIDOCAINE 5% 1 PATCH: 700 PATCH TOPICAL at 01:06

## 2022-02-09 RX ADMIN — FAMOTIDINE 20 MG: 10 INJECTION INTRAVENOUS at 08:06

## 2022-02-09 RX ADMIN — CHLORHEXIDINE GLUCONATE 0.12% ORAL RINSE 15 ML: 1.2 LIQUID ORAL at 01:01

## 2022-02-09 RX ADMIN — FAMOTIDINE 20 MG: 10 INJECTION INTRAVENOUS at 00:57

## 2022-02-09 RX ADMIN — MAGNESIUM SULFATE HEPTAHYDRATE 2 G: 40 INJECTION, SOLUTION INTRAVENOUS at 01:46

## 2022-02-09 RX ADMIN — CHLORHEXIDINE GLUCONATE 0.12% ORAL RINSE 15 ML: 1.2 LIQUID ORAL at 21:44

## 2022-02-09 RX ADMIN — HEPARIN SODIUM 5000 UNITS: 5000 INJECTION INTRAVENOUS; SUBCUTANEOUS at 21:44

## 2022-02-09 RX ADMIN — HEPARIN SODIUM 5000 UNITS: 5000 INJECTION INTRAVENOUS; SUBCUTANEOUS at 05:51

## 2022-02-09 RX ADMIN — FAMOTIDINE 20 MG: 10 INJECTION INTRAVENOUS at 21:44

## 2022-02-09 RX ADMIN — SODIUM CHLORIDE, SODIUM GLUCONATE, SODIUM ACETATE, POTASSIUM CHLORIDE, MAGNESIUM CHLORIDE, SODIUM PHOSPHATE, DIBASIC, AND POTASSIUM PHOSPHATE 125 ML/HR: .53; .5; .37; .037; .03; .012; .00082 INJECTION, SOLUTION INTRAVENOUS at 03:47

## 2022-02-09 RX ADMIN — ONDANSETRON 4 MG: 2 INJECTION INTRAMUSCULAR; INTRAVENOUS at 12:50

## 2022-02-09 NOTE — ASSESSMENT & PLAN NOTE
· Hemoglobin 9 1 post-op, now 8 7  · Baseline 12  · Received 1 7 L crystalloid and 500 mL Albumin intra-op  · Monitor serial H/H q6h  · Transfuse for hgb < 7 or active bleeding
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
## 2022-02-09 NOTE — ANESTHESIA POSTPROCEDURE EVALUATION
Post-Op Assessment Note    No complications documented  /72 (02/08/22 2230)    Temp (!) 97 3 °F (36 3 °C) (02/08/22 2230)    Pulse 69 (02/08/22 2230)   Resp 16 (02/08/22 2230)    SpO2 100 % (02/08/22 2230)    Called for c/o chest pain  She is in discomfort in the PACU after hydromorphone 2mg  She describes pain as upper abdominal pain below the ribs and in to the lower ribs  It is mostly midline but was somewhat right sided before  Her EKG shows SR and non specific ST and T changes  I do not see any clear ischemic changes  The PACU nurses state that hydromorphone has not been effective and sometimes resulted in PVCs  I will switch to morphine and continue to observe for ischemic cardiac issues

## 2022-02-09 NOTE — QUICK NOTE
The patient had a CT scan of the cervical spine demonstrating no acute injury  On exam, the patient had no midline point tenderness or paresthesias/ numbness/ weakness in the extremities  The patient had full range of motion, able to flex, extend and rotate her head laterally, without pain  There were no distracting injuries and the patient was not intoxicated  The patient's cervical spine was cleared radiologically and clinically   Cervical collar removed at this time
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
## 2022-02-09 NOTE — ASSESSMENT & PLAN NOTE
· Following with ENT as outpatient    PTA regimen omeprazole and pepcid  · Start Pepcid IV until okay to take po
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
## 2022-02-09 NOTE — ED NOTES
Daughter called to speak to mother, but she was already on her way to the OR        Lukas Olmstead  02/08/22 0792
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
## 2022-02-09 NOTE — ANESTHESIA POSTPROCEDURE EVALUATION
Post-Op Assessment Note    CV Status:  Stable    Pain management: adequate     Mental Status:  Awake and sleepy   Hydration Status:  Euvolemic   PONV Controlled:  Controlled   Airway Patency:  Patent      Post Op Vitals Reviewed: Yes      Staff: Anesthesiologist, CRNA         No complications documented      BP      Temp     Pulse     Resp      SpO2
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
## 2022-02-09 NOTE — PHYSICAL THERAPY NOTE
Physical Therapy Cancellation Note         02/09/22 1259   PT Last Visit   PT Visit Date 02/09/22   Note Type   Note type Cancelled Session   Cancel Reasons Other   Additional Comments referral received for PT eval and tx  attempted to see pt eval but she is pending right knee x-ray  will await imaging results and perform eval as appropriate       Echo Wiley, PT

## 2022-02-09 NOTE — ASSESSMENT & PLAN NOTE
· Hemoglobin 9 1 post-op  · Baseline 12  · Received 1 7 L crystalloid and 500 mL Albumin intra-op  · Monitor serial H/H q6h  · Transfuse for hgb < 7 or active bleeding

## 2022-02-10 PROBLEM — K21.9 LARYNGOPHARYNGEAL REFLUX (LPR): Chronic | Status: ACTIVE | Noted: 2022-02-09

## 2022-02-10 LAB
ANION GAP SERPL CALCULATED.3IONS-SCNC: 3 MMOL/L (ref 4–13)
BUN SERPL-MCNC: 7 MG/DL (ref 5–25)
CA-I BLD-SCNC: 0.95 MMOL/L (ref 1.12–1.32)
CALCIUM SERPL-MCNC: 7.8 MG/DL (ref 8.3–10.1)
CHLORIDE SERPL-SCNC: 106 MMOL/L (ref 100–108)
CO2 SERPL-SCNC: 28 MMOL/L (ref 21–32)
CREAT SERPL-MCNC: 0.65 MG/DL (ref 0.6–1.3)
ERYTHROCYTE [DISTWIDTH] IN BLOOD BY AUTOMATED COUNT: 13.8 % (ref 11.6–15.1)
GFR SERPL CREATININE-BSD FRML MDRD: 102 ML/MIN/1.73SQ M
GLUCOSE SERPL-MCNC: 112 MG/DL (ref 65–140)
HCT VFR BLD AUTO: 21.8 % (ref 34.8–46.1)
HCT VFR BLD AUTO: 21.9 % (ref 34.8–46.1)
HGB BLD-MCNC: 7 G/DL (ref 11.5–15.4)
HGB BLD-MCNC: 7 G/DL (ref 11.5–15.4)
MCH RBC QN AUTO: 28.6 PG (ref 26.8–34.3)
MCHC RBC AUTO-ENTMCNC: 30.7 G/DL (ref 31.4–37.4)
MCV RBC AUTO: 93 FL (ref 82–98)
PLATELET # BLD AUTO: 142 THOUSANDS/UL (ref 149–390)
PMV BLD AUTO: 11.3 FL (ref 8.9–12.7)
POTASSIUM SERPL-SCNC: 3.8 MMOL/L (ref 3.5–5.3)
RBC # BLD AUTO: 2.34 MILLION/UL (ref 3.81–5.12)
SODIUM SERPL-SCNC: 137 MMOL/L (ref 136–145)
WBC # BLD AUTO: 5.68 THOUSAND/UL (ref 4.31–10.16)

## 2022-02-10 PROCEDURE — NC001 PR NO CHARGE: Performed by: STUDENT IN AN ORGANIZED HEALTH CARE EDUCATION/TRAINING PROGRAM

## 2022-02-10 PROCEDURE — 97163 PT EVAL HIGH COMPLEX 45 MIN: CPT

## 2022-02-10 PROCEDURE — 97116 GAIT TRAINING THERAPY: CPT

## 2022-02-10 PROCEDURE — 85018 HEMOGLOBIN: CPT | Performed by: NURSE PRACTITIONER

## 2022-02-10 PROCEDURE — 97167 OT EVAL HIGH COMPLEX 60 MIN: CPT

## 2022-02-10 PROCEDURE — 85027 COMPLETE CBC AUTOMATED: CPT | Performed by: PHYSICIAN ASSISTANT

## 2022-02-10 PROCEDURE — 80048 BASIC METABOLIC PNL TOTAL CA: CPT | Performed by: PHYSICIAN ASSISTANT

## 2022-02-10 PROCEDURE — 85014 HEMATOCRIT: CPT | Performed by: NURSE PRACTITIONER

## 2022-02-10 PROCEDURE — 82330 ASSAY OF CALCIUM: CPT | Performed by: PHYSICIAN ASSISTANT

## 2022-02-10 PROCEDURE — 99232 SBSQ HOSP IP/OBS MODERATE 35: CPT | Performed by: SURGERY

## 2022-02-10 PROCEDURE — 97535 SELF CARE MNGMENT TRAINING: CPT

## 2022-02-10 RX ORDER — ESTRADIOL 1 MG/1
2 TABLET ORAL DAILY
Status: DISCONTINUED | OUTPATIENT
Start: 2022-02-10 | End: 2022-02-15 | Stop reason: HOSPADM

## 2022-02-10 RX ORDER — HYDROMORPHONE HCL/PF 1 MG/ML
1 SYRINGE (ML) INJECTION ONCE
Status: COMPLETED | OUTPATIENT
Start: 2022-02-10 | End: 2022-02-10

## 2022-02-10 RX ORDER — POTASSIUM CHLORIDE 14.9 MG/ML
20 INJECTION INTRAVENOUS ONCE
Status: COMPLETED | OUTPATIENT
Start: 2022-02-10 | End: 2022-02-10

## 2022-02-10 RX ORDER — CALCIUM GLUCONATE 20 MG/ML
2 INJECTION, SOLUTION INTRAVENOUS ONCE
Status: COMPLETED | OUTPATIENT
Start: 2022-02-10 | End: 2022-02-10

## 2022-02-10 RX ADMIN — FAMOTIDINE 20 MG: 10 INJECTION INTRAVENOUS at 20:50

## 2022-02-10 RX ADMIN — HYDROMORPHONE HYDROCHLORIDE 1 MG: 1 INJECTION, SOLUTION INTRAMUSCULAR; INTRAVENOUS; SUBCUTANEOUS at 03:20

## 2022-02-10 RX ADMIN — ESTRADIOL 2 MG: 1 TABLET ORAL at 13:25

## 2022-02-10 RX ADMIN — ENOXAPARIN SODIUM 30 MG: 30 INJECTION SUBCUTANEOUS at 20:50

## 2022-02-10 RX ADMIN — FAMOTIDINE 20 MG: 10 INJECTION INTRAVENOUS at 08:07

## 2022-02-10 RX ADMIN — SODIUM CHLORIDE, SODIUM GLUCONATE, SODIUM ACETATE, POTASSIUM CHLORIDE, MAGNESIUM CHLORIDE, SODIUM PHOSPHATE, DIBASIC, AND POTASSIUM PHOSPHATE 125 ML/HR: .53; .5; .37; .037; .03; .012; .00082 INJECTION, SOLUTION INTRAVENOUS at 02:00

## 2022-02-10 RX ADMIN — LIDOCAINE 5% 1 PATCH: 700 PATCH TOPICAL at 05:09

## 2022-02-10 RX ADMIN — CHLORHEXIDINE GLUCONATE 0.12% ORAL RINSE 15 ML: 1.2 LIQUID ORAL at 08:07

## 2022-02-10 RX ADMIN — POTASSIUM CHLORIDE 20 MEQ: 14.9 INJECTION, SOLUTION INTRAVENOUS at 08:01

## 2022-02-10 RX ADMIN — CALCIUM GLUCONATE 2 G: 20 INJECTION, SOLUTION INTRAVENOUS at 06:51

## 2022-02-10 RX ADMIN — SODIUM CHLORIDE, SODIUM GLUCONATE, SODIUM ACETATE, POTASSIUM CHLORIDE, MAGNESIUM CHLORIDE, SODIUM PHOSPHATE, DIBASIC, AND POTASSIUM PHOSPHATE 50 ML/HR: .53; .5; .37; .037; .03; .012; .00082 INJECTION, SOLUTION INTRAVENOUS at 15:43

## 2022-02-10 RX ADMIN — ENOXAPARIN SODIUM 30 MG: 30 INJECTION SUBCUTANEOUS at 08:07
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
## 2022-02-10 NOTE — ASSESSMENT & PLAN NOTE
· CT c/a/p: Small amount of hemorrhagic fluid in the perihepatic, right paracolic gutter behind the cecum and in the mesentery which demonstrates haziness with focal blush suggesting mesenteric venous injury  · POD #2 s/p ex-lap, small bowel resection 2/2 mesenteric venous injury -- extensive small bowel bucket-handle injury with approximately 60 cm of non-viable distal jejunum/ileum  · NPO- Will discuss advancing diet to clears today with general surgery   · Local wound care per surgery  · DVT ppx with SCDs and Lovenox   · Continue dilaudid PCA for post operative pain  Change to PO medications with IV dilaudid prn when taking PO medications
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
## 2022-02-10 NOTE — ASSESSMENT & PLAN NOTE
· Secondary to acute blood secondary to mesenteric hemorrhage   · Hemoglobin 9 1 post-op  · Baseline 12  · Trend hgb
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
## 2022-02-10 NOTE — PLAN OF CARE
Problem: MOBILITY - ADULT  Goal: Maintain or return to baseline ADL function  Description: INTERVENTIONS:  -  Assess patient's ability to carry out ADLs; assess patient's baseline for ADL function and identify physical deficits which impact ability to perform ADLs (bathing, care of mouth/teeth, toileting, grooming, dressing, etc )  - Assess/evaluate cause of self-care deficits   - Assess range of motion  - Assess patient's mobility; develop plan if impaired  - Assess patient's need for assistive devices and provide as appropriate  - Encourage maximum independence but intervene and supervise when necessary  - Involve family in performance of ADLs  - Assess for home care needs following discharge   - Consider OT consult to assist with ADL evaluation and planning for discharge  - Provide patient education as appropriate  Outcome: Progressing  Goal: Maintains/Returns to pre admission functional level  Description: INTERVENTIONS:  - Perform BMAT or MOVE assessment daily    - Set and communicate daily mobility goal to care team and patient/family/caregiver     - Collaborate with rehabilitation services on mobility goals if consulted  - Out of bed for toileting  - Record patient progress and toleration of activity level   Outcome: Progressing     Problem: Potential for Falls  Goal: Patient will remain free of falls  Description: INTERVENTIONS:  - Educate patient/family on patient safety including physical limitations  - Instruct patient to call for assistance with activity   - Consult OT/PT to assist with strengthening/mobility   - Keep Call bell within reach  - Keep bed low and locked with side rails adjusted as appropriate  - Keep care items and personal belongings within reach  - Initiate and maintain comfort rounds  - Make Fall Risk Sign visible to staff  - Offer Toileting   - Obtain necessary fall risk management equipment  - Apply yellow socks and bracelet for high fall risk patients  - Consider moving patient to room near nurses station  Outcome: Progressing     Problem: Prexisting or High Potential for Compromised Skin Integrity  Goal: Skin integrity is maintained or improved  Description: INTERVENTIONS:  - Identify patients at risk for skin breakdown  - Assess and monitor skin integrity  - Assess and monitor nutrition and hydration status  - Monitor labs   - Assess for incontinence   - Turn and reposition patient  - Assist with mobility/ambulation  - Relieve pressure over bony prominences  - Avoid friction and shearing  - Provide appropriate hygiene as needed including keeping skin clean and dry  - Evaluate need for skin moisturizer/barrier cream  - Collaborate with interdisciplinary team   - Patient/family teaching  - Consider wound care consult   Outcome: Progressing

## 2022-02-11 LAB
ANION GAP SERPL CALCULATED.3IONS-SCNC: 5 MMOL/L (ref 4–13)
ATRIAL RATE: 99 BPM
BUN SERPL-MCNC: 6 MG/DL (ref 5–25)
CALCIUM SERPL-MCNC: 8.7 MG/DL (ref 8.3–10.1)
CHLORIDE SERPL-SCNC: 104 MMOL/L (ref 100–108)
CO2 SERPL-SCNC: 28 MMOL/L (ref 21–32)
CREAT SERPL-MCNC: 0.64 MG/DL (ref 0.6–1.3)
ERYTHROCYTE [DISTWIDTH] IN BLOOD BY AUTOMATED COUNT: 13.4 % (ref 11.6–15.1)
GFR SERPL CREATININE-BSD FRML MDRD: 102 ML/MIN/1.73SQ M
GLUCOSE SERPL-MCNC: 101 MG/DL (ref 65–140)
HCT VFR BLD AUTO: 23.4 % (ref 34.8–46.1)
HGB BLD-MCNC: 7.2 G/DL (ref 11.5–15.4)
MCH RBC QN AUTO: 28.6 PG (ref 26.8–34.3)
MCHC RBC AUTO-ENTMCNC: 30.8 G/DL (ref 31.4–37.4)
MCV RBC AUTO: 93 FL (ref 82–98)
P AXIS: 36 DEGREES
PLATELET # BLD AUTO: 147 THOUSANDS/UL (ref 149–390)
PMV BLD AUTO: 11.4 FL (ref 8.9–12.7)
POTASSIUM SERPL-SCNC: 3.7 MMOL/L (ref 3.5–5.3)
PR INTERVAL: 196 MS
QRS AXIS: 16 DEGREES
QRSD INTERVAL: 82 MS
QT INTERVAL: 368 MS
QTC INTERVAL: 472 MS
RBC # BLD AUTO: 2.52 MILLION/UL (ref 3.81–5.12)
SODIUM SERPL-SCNC: 137 MMOL/L (ref 136–145)
T WAVE AXIS: 89 DEGREES
VENTRICULAR RATE: 99 BPM
WBC # BLD AUTO: 6.46 THOUSAND/UL (ref 4.31–10.16)

## 2022-02-11 PROCEDURE — NC001 PR NO CHARGE: Performed by: STUDENT IN AN ORGANIZED HEALTH CARE EDUCATION/TRAINING PROGRAM

## 2022-02-11 PROCEDURE — 93010 ELECTROCARDIOGRAM REPORT: CPT | Performed by: INTERNAL MEDICINE

## 2022-02-11 PROCEDURE — 99232 SBSQ HOSP IP/OBS MODERATE 35: CPT | Performed by: SURGERY

## 2022-02-11 PROCEDURE — 85027 COMPLETE CBC AUTOMATED: CPT | Performed by: NURSE PRACTITIONER

## 2022-02-11 PROCEDURE — 93005 ELECTROCARDIOGRAM TRACING: CPT

## 2022-02-11 PROCEDURE — 80048 BASIC METABOLIC PNL TOTAL CA: CPT | Performed by: NURSE PRACTITIONER

## 2022-02-11 RX ADMIN — ENOXAPARIN SODIUM 30 MG: 30 INJECTION SUBCUTANEOUS at 09:17

## 2022-02-11 RX ADMIN — ENOXAPARIN SODIUM 30 MG: 30 INJECTION SUBCUTANEOUS at 20:44

## 2022-02-11 RX ADMIN — ESTRADIOL 2 MG: 1 TABLET ORAL at 09:51

## 2022-02-11 RX ADMIN — FAMOTIDINE 20 MG: 10 INJECTION INTRAVENOUS at 09:17

## 2022-02-11 RX ADMIN — SODIUM CHLORIDE, SODIUM GLUCONATE, SODIUM ACETATE, POTASSIUM CHLORIDE, MAGNESIUM CHLORIDE, SODIUM PHOSPHATE, DIBASIC, AND POTASSIUM PHOSPHATE 50 ML/HR: .53; .5; .37; .037; .03; .012; .00082 INJECTION, SOLUTION INTRAVENOUS at 11:34

## 2022-02-11 RX ADMIN — FAMOTIDINE 20 MG: 10 INJECTION INTRAVENOUS at 20:44

## 2022-02-11 NOTE — NUTRITION
02/11/22 1410   Recommendations/Interventions   Interventions Diet: to Advance  (Awaiting bowel function)   Nutrition Recommendations Continue diet as ordered; Other (Specify)  (Low Fiber; Low Residue (pending bowel function and tolerance of clear liquids) - Consider Ensure Clear (Mixed Berry) QD @ B to increase kcal/pro intake postop)
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
## 2022-02-11 NOTE — ASSESSMENT & PLAN NOTE
· APS consulted  · Weaned Dilaudid PCA today to 0 3 q 6 min  · Will likely transfer to PRN regimen tomorrow
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
## 2022-02-11 NOTE — PLAN OF CARE
Problem: MOBILITY - ADULT  Goal: Maintain or return to baseline ADL function  Description: INTERVENTIONS:  -  Assess patient's ability to carry out ADLs; assess patient's baseline for ADL function and identify physical deficits which impact ability to perform ADLs (bathing, care of mouth/teeth, toileting, grooming, dressing, etc )  - Assess/evaluate cause of self-care deficits   - Assess range of motion  - Assess patient's mobility; develop plan if impaired  - Assess patient's need for assistive devices and provide as appropriate  - Encourage maximum independence but intervene and supervise when necessary  - Involve family in performance of ADLs  - Assess for home care needs following discharge   - Consider OT consult to assist with ADL evaluation and planning for discharge  - Provide patient education as appropriate  Outcome: Progressing  Goal: Maintains/Returns to pre admission functional level  Description: INTERVENTIONS:  - Perform BMAT or MOVE assessment daily    - Set and communicate daily mobility goal to care team and patient/family/caregiver  - Collaborate with rehabilitation services on mobility goals if consulted  - Perform Range of Motion 3 times a day  - Reposition patient every 2 hours    - Dangle patient 3 times a day  - Stand patient 3 times a day  - Ambulate patient 3 times a day  - Out of bed to chair 3 times a day   - Out of bed for meals 3 times a day  - Out of bed for toileting  - Record patient progress and toleration of activity level   Outcome: Progressing     Problem: Potential for Falls  Goal: Patient will remain free of falls  Description: INTERVENTIONS:  - Educate patient/family on patient safety including physical limitations  - Instruct patient to call for assistance with activity   - Consult OT/PT to assist with strengthening/mobility   - Keep Call bell within reach  - Keep bed low and locked with side rails adjusted as appropriate  - Keep care items and personal belongings within reach  - Initiate and maintain comfort rounds  - Make Fall Risk Sign visible to staff  - Offer Toileting every 2 Hours, in advance of need  - Initiate/Maintain alarm  - Obtain necessary fall risk management equipment:   - Apply yellow socks and bracelet for high fall risk patients  - Consider moving patient to room near nurses station  Outcome: Progressing     Problem: Prexisting or High Potential for Compromised Skin Integrity  Goal: Skin integrity is maintained or improved  Description: INTERVENTIONS:  - Identify patients at risk for skin breakdown  - Assess and monitor skin integrity  - Assess and monitor nutrition and hydration status  - Monitor labs   - Assess for incontinence   - Turn and reposition patient  - Assist with mobility/ambulation  - Relieve pressure over bony prominences  - Avoid friction and shearing  - Provide appropriate hygiene as needed including keeping skin clean and dry  - Evaluate need for skin moisturizer/barrier cream  - Collaborate with interdisciplinary team   - Patient/family teaching  - Consider wound care consult   Outcome: Progressing

## 2022-02-12 ENCOUNTER — APPOINTMENT (INPATIENT)
Dept: RADIOLOGY | Facility: HOSPITAL | Age: 52
DRG: 329 | End: 2022-02-12
Payer: COMMERCIAL

## 2022-02-12 LAB
ANION GAP SERPL CALCULATED.3IONS-SCNC: 7 MMOL/L (ref 4–13)
BASOPHILS # BLD AUTO: 0.02 THOUSANDS/ΜL (ref 0–0.1)
BASOPHILS NFR BLD AUTO: 0 % (ref 0–1)
BUN SERPL-MCNC: 5 MG/DL (ref 5–25)
CALCIUM SERPL-MCNC: 9 MG/DL (ref 8.3–10.1)
CHLORIDE SERPL-SCNC: 106 MMOL/L (ref 100–108)
CO2 SERPL-SCNC: 28 MMOL/L (ref 21–32)
CREAT SERPL-MCNC: 0.72 MG/DL (ref 0.6–1.3)
EOSINOPHIL # BLD AUTO: 0.56 THOUSAND/ΜL (ref 0–0.61)
EOSINOPHIL NFR BLD AUTO: 11 % (ref 0–6)
ERYTHROCYTE [DISTWIDTH] IN BLOOD BY AUTOMATED COUNT: 13.5 % (ref 11.6–15.1)
GFR SERPL CREATININE-BSD FRML MDRD: 96 ML/MIN/1.73SQ M
GLUCOSE SERPL-MCNC: 117 MG/DL (ref 65–140)
HCT VFR BLD AUTO: 20.6 % (ref 34.8–46.1)
HCT VFR BLD AUTO: 21.4 % (ref 34.8–46.1)
HGB BLD-MCNC: 6.6 G/DL (ref 11.5–15.4)
HGB BLD-MCNC: 6.8 G/DL (ref 11.5–15.4)
IMM GRANULOCYTES # BLD AUTO: 0.03 THOUSAND/UL (ref 0–0.2)
IMM GRANULOCYTES NFR BLD AUTO: 1 % (ref 0–2)
LYMPHOCYTES # BLD AUTO: 0.94 THOUSANDS/ΜL (ref 0.6–4.47)
LYMPHOCYTES NFR BLD AUTO: 18 % (ref 14–44)
MCH RBC QN AUTO: 29.1 PG (ref 26.8–34.3)
MCHC RBC AUTO-ENTMCNC: 32 G/DL (ref 31.4–37.4)
MCV RBC AUTO: 91 FL (ref 82–98)
MONOCYTES # BLD AUTO: 0.44 THOUSAND/ΜL (ref 0.17–1.22)
MONOCYTES NFR BLD AUTO: 8 % (ref 4–12)
NEUTROPHILS # BLD AUTO: 3.36 THOUSANDS/ΜL (ref 1.85–7.62)
NEUTS SEG NFR BLD AUTO: 62 % (ref 43–75)
NRBC BLD AUTO-RTO: 0 /100 WBCS
PLATELET # BLD AUTO: 170 THOUSANDS/UL (ref 149–390)
PMV BLD AUTO: 11.5 FL (ref 8.9–12.7)
POTASSIUM SERPL-SCNC: 4.2 MMOL/L (ref 3.5–5.3)
RBC # BLD AUTO: 2.27 MILLION/UL (ref 3.81–5.12)
SODIUM SERPL-SCNC: 141 MMOL/L (ref 136–145)
WBC # BLD AUTO: 5.35 THOUSAND/UL (ref 4.31–10.16)

## 2022-02-12 PROCEDURE — 85025 COMPLETE CBC W/AUTO DIFF WBC: CPT | Performed by: NURSE PRACTITIONER

## 2022-02-12 PROCEDURE — 99231 SBSQ HOSP IP/OBS SF/LOW 25: CPT | Performed by: SURGERY

## 2022-02-12 PROCEDURE — 74018 RADEX ABDOMEN 1 VIEW: CPT

## 2022-02-12 PROCEDURE — 85014 HEMATOCRIT: CPT | Performed by: NURSE PRACTITIONER

## 2022-02-12 PROCEDURE — NC001 PR NO CHARGE: Performed by: SURGERY

## 2022-02-12 PROCEDURE — 80048 BASIC METABOLIC PNL TOTAL CA: CPT | Performed by: NURSE PRACTITIONER

## 2022-02-12 PROCEDURE — 85018 HEMOGLOBIN: CPT | Performed by: NURSE PRACTITIONER

## 2022-02-12 RX ORDER — OXYCODONE HYDROCHLORIDE 5 MG/1
5 TABLET ORAL EVERY 4 HOURS PRN
Status: DISCONTINUED | OUTPATIENT
Start: 2022-02-12 | End: 2022-02-15 | Stop reason: HOSPADM

## 2022-02-12 RX ORDER — POLYETHYLENE GLYCOL 3350 17 G/17G
17 POWDER, FOR SOLUTION ORAL DAILY
Status: DISCONTINUED | OUTPATIENT
Start: 2022-02-12 | End: 2022-02-12

## 2022-02-12 RX ORDER — AMOXICILLIN 250 MG
1 CAPSULE ORAL 2 TIMES DAILY
Status: DISCONTINUED | OUTPATIENT
Start: 2022-02-12 | End: 2022-02-12

## 2022-02-12 RX ORDER — OXYCODONE HYDROCHLORIDE 10 MG/1
10 TABLET ORAL EVERY 4 HOURS PRN
Status: DISCONTINUED | OUTPATIENT
Start: 2022-02-12 | End: 2022-02-15 | Stop reason: HOSPADM

## 2022-02-12 RX ORDER — METHOCARBAMOL 500 MG/1
500 TABLET, FILM COATED ORAL EVERY 6 HOURS PRN
Status: DISCONTINUED | OUTPATIENT
Start: 2022-02-12 | End: 2022-02-15 | Stop reason: HOSPADM

## 2022-02-12 RX ORDER — HYDROMORPHONE HCL/PF 1 MG/ML
0.5 SYRINGE (ML) INJECTION EVERY 4 HOURS PRN
Status: DISCONTINUED | OUTPATIENT
Start: 2022-02-12 | End: 2022-02-13

## 2022-02-12 RX ADMIN — FAMOTIDINE 20 MG: 10 INJECTION INTRAVENOUS at 10:12

## 2022-02-12 RX ADMIN — OXYCODONE HYDROCHLORIDE 10 MG: 10 TABLET ORAL at 22:54

## 2022-02-12 RX ADMIN — ENOXAPARIN SODIUM 30 MG: 30 INJECTION SUBCUTANEOUS at 10:13

## 2022-02-12 RX ADMIN — ENOXAPARIN SODIUM 30 MG: 30 INJECTION SUBCUTANEOUS at 21:50

## 2022-02-12 RX ADMIN — ESTRADIOL 2 MG: 1 TABLET ORAL at 10:13

## 2022-02-12 RX ADMIN — OXYCODONE HYDROCHLORIDE 5 MG: 5 TABLET ORAL at 13:05

## 2022-02-12 RX ADMIN — HYDROMORPHONE HYDROCHLORIDE 0.5 MG: 1 INJECTION, SOLUTION INTRAMUSCULAR; INTRAVENOUS; SUBCUTANEOUS at 16:37

## 2022-02-12 RX ADMIN — SODIUM CHLORIDE, SODIUM GLUCONATE, SODIUM ACETATE, POTASSIUM CHLORIDE, MAGNESIUM CHLORIDE, SODIUM PHOSPHATE, DIBASIC, AND POTASSIUM PHOSPHATE 50 ML/HR: .53; .5; .37; .037; .03; .012; .00082 INJECTION, SOLUTION INTRAVENOUS at 06:15

## 2022-02-12 RX ADMIN — FAMOTIDINE 20 MG: 10 INJECTION INTRAVENOUS at 21:50

## 2022-02-12 NOTE — ASSESSMENT & PLAN NOTE
· Hemoglobin down this am to 6 6 patient remains hemodynamically stable   · Recheck at noon  · Continue to monitor for hemodynamic instability
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
## 2022-02-13 ENCOUNTER — APPOINTMENT (INPATIENT)
Dept: RADIOLOGY | Facility: HOSPITAL | Age: 52
DRG: 329 | End: 2022-02-13
Payer: COMMERCIAL

## 2022-02-13 PROBLEM — M25.571 ACUTE RIGHT ANKLE PAIN: Status: ACTIVE | Noted: 2022-02-13

## 2022-02-13 LAB
ANION GAP SERPL CALCULATED.3IONS-SCNC: 6 MMOL/L (ref 4–13)
BASOPHILS # BLD AUTO: 0.03 THOUSANDS/ΜL (ref 0–0.1)
BASOPHILS NFR BLD AUTO: 1 % (ref 0–1)
BUN SERPL-MCNC: 5 MG/DL (ref 5–25)
CALCIUM SERPL-MCNC: 8.9 MG/DL (ref 8.3–10.1)
CHLORIDE SERPL-SCNC: 105 MMOL/L (ref 100–108)
CO2 SERPL-SCNC: 28 MMOL/L (ref 21–32)
CREAT SERPL-MCNC: 0.66 MG/DL (ref 0.6–1.3)
EOSINOPHIL # BLD AUTO: 0.57 THOUSAND/ΜL (ref 0–0.61)
EOSINOPHIL NFR BLD AUTO: 12 % (ref 0–6)
ERYTHROCYTE [DISTWIDTH] IN BLOOD BY AUTOMATED COUNT: 13.4 % (ref 11.6–15.1)
GFR SERPL CREATININE-BSD FRML MDRD: 101 ML/MIN/1.73SQ M
GLUCOSE SERPL-MCNC: 110 MG/DL (ref 65–140)
HCT VFR BLD AUTO: 20.4 % (ref 34.8–46.1)
HGB BLD-MCNC: 6.6 G/DL (ref 11.5–15.4)
IMM GRANULOCYTES # BLD AUTO: 0.02 THOUSAND/UL (ref 0–0.2)
IMM GRANULOCYTES NFR BLD AUTO: 0 % (ref 0–2)
LYMPHOCYTES # BLD AUTO: 1.1 THOUSANDS/ΜL (ref 0.6–4.47)
LYMPHOCYTES NFR BLD AUTO: 23 % (ref 14–44)
MCH RBC QN AUTO: 29.3 PG (ref 26.8–34.3)
MCHC RBC AUTO-ENTMCNC: 32.4 G/DL (ref 31.4–37.4)
MCV RBC AUTO: 91 FL (ref 82–98)
MONOCYTES # BLD AUTO: 0.45 THOUSAND/ΜL (ref 0.17–1.22)
MONOCYTES NFR BLD AUTO: 9 % (ref 4–12)
NEUTROPHILS # BLD AUTO: 2.6 THOUSANDS/ΜL (ref 1.85–7.62)
NEUTS SEG NFR BLD AUTO: 55 % (ref 43–75)
NRBC BLD AUTO-RTO: 0 /100 WBCS
PLATELET # BLD AUTO: 198 THOUSANDS/UL (ref 149–390)
PMV BLD AUTO: 10.8 FL (ref 8.9–12.7)
POTASSIUM SERPL-SCNC: 4 MMOL/L (ref 3.5–5.3)
RBC # BLD AUTO: 2.25 MILLION/UL (ref 3.81–5.12)
SODIUM SERPL-SCNC: 139 MMOL/L (ref 136–145)
WBC # BLD AUTO: 4.77 THOUSAND/UL (ref 4.31–10.16)

## 2022-02-13 PROCEDURE — 99232 SBSQ HOSP IP/OBS MODERATE 35: CPT | Performed by: NURSE PRACTITIONER

## 2022-02-13 PROCEDURE — NC001 PR NO CHARGE: Performed by: SURGERY

## 2022-02-13 PROCEDURE — 73630 X-RAY EXAM OF FOOT: CPT

## 2022-02-13 PROCEDURE — 73610 X-RAY EXAM OF ANKLE: CPT

## 2022-02-13 PROCEDURE — 85025 COMPLETE CBC W/AUTO DIFF WBC: CPT | Performed by: SURGERY

## 2022-02-13 PROCEDURE — 80048 BASIC METABOLIC PNL TOTAL CA: CPT | Performed by: SURGERY

## 2022-02-13 RX ADMIN — FAMOTIDINE 20 MG: 10 INJECTION INTRAVENOUS at 20:57

## 2022-02-13 RX ADMIN — ENOXAPARIN SODIUM 30 MG: 30 INJECTION SUBCUTANEOUS at 09:04

## 2022-02-13 RX ADMIN — FAMOTIDINE 20 MG: 10 INJECTION INTRAVENOUS at 09:04

## 2022-02-13 RX ADMIN — OXYCODONE HYDROCHLORIDE 5 MG: 5 TABLET ORAL at 16:25

## 2022-02-13 RX ADMIN — ENOXAPARIN SODIUM 30 MG: 30 INJECTION SUBCUTANEOUS at 20:56

## 2022-02-13 RX ADMIN — OXYCODONE HYDROCHLORIDE 5 MG: 5 TABLET ORAL at 20:56

## 2022-02-13 RX ADMIN — ESTRADIOL 2 MG: 1 TABLET ORAL at 09:05

## 2022-02-13 RX ADMIN — SODIUM CHLORIDE, SODIUM GLUCONATE, SODIUM ACETATE, POTASSIUM CHLORIDE, MAGNESIUM CHLORIDE, SODIUM PHOSPHATE, DIBASIC, AND POTASSIUM PHOSPHATE 50 ML/HR: .53; .5; .37; .037; .03; .012; .00082 INJECTION, SOLUTION INTRAVENOUS at 09:02

## 2022-02-13 RX ADMIN — OXYCODONE HYDROCHLORIDE 5 MG: 5 TABLET ORAL at 06:20

## 2022-02-13 RX ADMIN — OXYCODONE HYDROCHLORIDE 5 MG: 5 TABLET ORAL at 11:10

## 2022-02-13 NOTE — PLAN OF CARE
Problem: MOBILITY - ADULT  Goal: Maintain or return to baseline ADL function  Description: INTERVENTIONS:  -  Assess patient's ability to carry out ADLs; assess patient's baseline for ADL function and identify physical deficits which impact ability to perform ADLs (bathing, care of mouth/teeth, toileting, grooming, dressing, etc )  - Assess/evaluate cause of self-care deficits   - Assess range of motion  - Assess patient's mobility; develop plan if impaired  - Assess patient's need for assistive devices and provide as appropriate  - Encourage maximum independence but intervene and supervise when necessary  - Involve family in performance of ADLs  - Assess for home care needs following discharge   - Consider OT consult to assist with ADL evaluation and planning for discharge  - Provide patient education as appropriate  Outcome: Progressing  Goal: Maintains/Returns to pre admission functional level  Description: INTERVENTIONS:  - Perform BMAT or MOVE assessment daily    - Set and communicate daily mobility goal to care team and patient/family/caregiver  - Collaborate with rehabilitation services on mobility goals if consulted  - Perform Range of Motion 3 times a day  - Reposition patient every 2 hours    - Dangle patient 3 times a day  - Stand patient 3 times a day  - Ambulate patient 3 times a day  - Out of bed to chair 3 times a day   - Out of bed for meals 3 times a day  - Out of bed for toileting  - Record patient progress and toleration of activity level   Outcome: Progressing     Problem: Potential for Falls  Goal: Patient will remain free of falls  Description: INTERVENTIONS:  - Educate patient/family on patient safety including physical limitations  - Instruct patient to call for assistance with activity   - Consult OT/PT to assist with strengthening/mobility   - Keep Call bell within reach  - Keep bed low and locked with side rails adjusted as appropriate  - Keep care items and personal belongings within reach  - Initiate and maintain comfort rounds  - Make Fall Risk Sign visible to staff  - Offer Toileting every 2 Hours, in advance of need  - Initiate/Maintain alarm  - Obtain necessary fall risk management equipment:   - Apply yellow socks and bracelet for high fall risk patients  - Consider moving patient to room near nurses station  Outcome: Progressing     Problem: Prexisting or High Potential for Compromised Skin Integrity  Goal: Skin integrity is maintained or improved  Description: INTERVENTIONS:  - Identify patients at risk for skin breakdown  - Assess and monitor skin integrity  - Assess and monitor nutrition and hydration status  - Monitor labs   - Assess for incontinence   - Turn and reposition patient  - Assist with mobility/ambulation  - Relieve pressure over bony prominences  - Avoid friction and shearing  - Provide appropriate hygiene as needed including keeping skin clean and dry  - Evaluate need for skin moisturizer/barrier cream  - Collaborate with interdisciplinary team   - Patient/family teaching  - Consider wound care consult   Outcome: Progressing     Problem: Nutrition/Hydration-ADULT  Goal: Nutrient/Hydration intake appropriate for improving, restoring or maintaining nutritional needs  Description: Monitor and assess patient's nutrition/hydration status for malnutrition  Collaborate with interdisciplinary team and initiate plan and interventions as ordered  Monitor patient's weight and dietary intake as ordered or per policy  Utilize nutrition screening tool and intervene as necessary  Determine patient's food preferences and provide high-protein, high-caloric foods as appropriate       INTERVENTIONS:  - Monitor oral intake, urinary output, labs, and treatment plans  - Assess nutrition and hydration status and recommend course of action  - Evaluate amount of meals eaten  - Assist patient with eating if necessary   - Allow adequate time for meals  - Recommend/ encourage appropriate diets, oral nutritional supplements, and vitamin/mineral supplements  - Order, calculate, and assess calorie counts as needed  - Recommend, monitor, and adjust tube feedings and TPN/PPN based on assessed needs  - Assess need for intravenous fluids  - Provide specific nutrition/hydration education is appropriate  - Include patient/family/caregiver in decisions related to nutrition  Outcome: Progressing

## 2022-02-13 NOTE — ASSESSMENT & PLAN NOTE
· Hemoglobin down this am to 6 6 patient remains hemodynamically stable   · Continue to monitor for hemodynamic instability
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
## 2022-02-13 NOTE — ASSESSMENT & PLAN NOTE
· Complaining of right ankle pain, we brought on by ambulating  · No significant tenderness or pain with manipulation    · XR foot and ankle pending
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
## 2022-02-14 LAB
ABO GROUP BLD: NORMAL
ANION GAP SERPL CALCULATED.3IONS-SCNC: 4 MMOL/L (ref 4–13)
BASOPHILS # BLD AUTO: 0.03 THOUSANDS/ΜL (ref 0–0.1)
BASOPHILS NFR BLD AUTO: 1 % (ref 0–1)
BLD GP AB SCN SERPL QL: NEGATIVE
BUN SERPL-MCNC: 9 MG/DL (ref 5–25)
CALCIUM SERPL-MCNC: 8.9 MG/DL (ref 8.3–10.1)
CHLORIDE SERPL-SCNC: 107 MMOL/L (ref 100–108)
CO2 SERPL-SCNC: 28 MMOL/L (ref 21–32)
CREAT SERPL-MCNC: 0.72 MG/DL (ref 0.6–1.3)
EOSINOPHIL # BLD AUTO: 0.5 THOUSAND/ΜL (ref 0–0.61)
EOSINOPHIL NFR BLD AUTO: 12 % (ref 0–6)
ERYTHROCYTE [DISTWIDTH] IN BLOOD BY AUTOMATED COUNT: 13.6 % (ref 11.6–15.1)
GFR SERPL CREATININE-BSD FRML MDRD: 96 ML/MIN/1.73SQ M
GLUCOSE SERPL-MCNC: 106 MG/DL (ref 65–140)
HCT VFR BLD AUTO: 20.2 % (ref 34.8–46.1)
HCT VFR BLD AUTO: 24.5 % (ref 34.8–46.1)
HGB BLD-MCNC: 6.5 G/DL (ref 11.5–15.4)
HGB BLD-MCNC: 7.8 G/DL (ref 11.5–15.4)
IMM GRANULOCYTES # BLD AUTO: 0.02 THOUSAND/UL (ref 0–0.2)
IMM GRANULOCYTES NFR BLD AUTO: 1 % (ref 0–2)
LYMPHOCYTES # BLD AUTO: 1.06 THOUSANDS/ΜL (ref 0.6–4.47)
LYMPHOCYTES NFR BLD AUTO: 25 % (ref 14–44)
MCH RBC QN AUTO: 28.8 PG (ref 26.8–34.3)
MCHC RBC AUTO-ENTMCNC: 32.2 G/DL (ref 31.4–37.4)
MCV RBC AUTO: 89 FL (ref 82–98)
MONOCYTES # BLD AUTO: 0.42 THOUSAND/ΜL (ref 0.17–1.22)
MONOCYTES NFR BLD AUTO: 10 % (ref 4–12)
NEUTROPHILS # BLD AUTO: 2.15 THOUSANDS/ΜL (ref 1.85–7.62)
NEUTS SEG NFR BLD AUTO: 51 % (ref 43–75)
NRBC BLD AUTO-RTO: 0 /100 WBCS
PLATELET # BLD AUTO: 211 THOUSANDS/UL (ref 149–390)
PMV BLD AUTO: 10.7 FL (ref 8.9–12.7)
POTASSIUM SERPL-SCNC: 3.9 MMOL/L (ref 3.5–5.3)
RBC # BLD AUTO: 2.26 MILLION/UL (ref 3.81–5.12)
RH BLD: POSITIVE
SODIUM SERPL-SCNC: 139 MMOL/L (ref 136–145)
SPECIMEN EXPIRATION DATE: NORMAL
WBC # BLD AUTO: 4.18 THOUSAND/UL (ref 4.31–10.16)

## 2022-02-14 PROCEDURE — 85025 COMPLETE CBC W/AUTO DIFF WBC: CPT | Performed by: NURSE PRACTITIONER

## 2022-02-14 PROCEDURE — 86850 RBC ANTIBODY SCREEN: CPT | Performed by: PHYSICIAN ASSISTANT

## 2022-02-14 PROCEDURE — 86900 BLOOD TYPING SEROLOGIC ABO: CPT | Performed by: PHYSICIAN ASSISTANT

## 2022-02-14 PROCEDURE — 80048 BASIC METABOLIC PNL TOTAL CA: CPT | Performed by: NURSE PRACTITIONER

## 2022-02-14 PROCEDURE — P9016 RBC LEUKOCYTES REDUCED: HCPCS

## 2022-02-14 PROCEDURE — 86901 BLOOD TYPING SEROLOGIC RH(D): CPT | Performed by: PHYSICIAN ASSISTANT

## 2022-02-14 PROCEDURE — NC001 PR NO CHARGE: Performed by: SURGERY

## 2022-02-14 PROCEDURE — 93005 ELECTROCARDIOGRAM TRACING: CPT

## 2022-02-14 PROCEDURE — 85014 HEMATOCRIT: CPT | Performed by: SURGERY

## 2022-02-14 PROCEDURE — 99233 SBSQ HOSP IP/OBS HIGH 50: CPT | Performed by: SURGERY

## 2022-02-14 PROCEDURE — 85018 HEMOGLOBIN: CPT | Performed by: SURGERY

## 2022-02-14 PROCEDURE — 86923 COMPATIBILITY TEST ELECTRIC: CPT

## 2022-02-14 PROCEDURE — 30233N1 TRANSFUSION OF NONAUTOLOGOUS RED BLOOD CELLS INTO PERIPHERAL VEIN, PERCUTANEOUS APPROACH: ICD-10-PCS | Performed by: SURGERY

## 2022-02-14 RX ORDER — SODIUM CHLORIDE, SODIUM GLUCONATE, SODIUM ACETATE, POTASSIUM CHLORIDE, MAGNESIUM CHLORIDE, SODIUM PHOSPHATE, DIBASIC, AND POTASSIUM PHOSPHATE .53; .5; .37; .037; .03; .012; .00082 G/100ML; G/100ML; G/100ML; G/100ML; G/100ML; G/100ML; G/100ML
500 INJECTION, SOLUTION INTRAVENOUS ONCE
Status: COMPLETED | OUTPATIENT
Start: 2022-02-14 | End: 2022-02-14

## 2022-02-14 RX ORDER — DOCUSATE SODIUM 100 MG/1
100 CAPSULE, LIQUID FILLED ORAL 2 TIMES DAILY
Status: DISCONTINUED | OUTPATIENT
Start: 2022-02-14 | End: 2022-02-15 | Stop reason: HOSPADM

## 2022-02-14 RX ORDER — SENNOSIDES 8.6 MG
2 TABLET ORAL
Status: DISCONTINUED | OUTPATIENT
Start: 2022-02-14 | End: 2022-02-15 | Stop reason: HOSPADM

## 2022-02-14 RX ORDER — ACETAMINOPHEN 325 MG/1
975 TABLET ORAL EVERY 8 HOURS SCHEDULED
Status: DISCONTINUED | OUTPATIENT
Start: 2022-02-14 | End: 2022-02-15 | Stop reason: HOSPADM

## 2022-02-14 RX ADMIN — ENOXAPARIN SODIUM 30 MG: 30 INJECTION SUBCUTANEOUS at 20:39

## 2022-02-14 RX ADMIN — ENOXAPARIN SODIUM 30 MG: 30 INJECTION SUBCUTANEOUS at 09:12

## 2022-02-14 RX ADMIN — FAMOTIDINE 20 MG: 10 INJECTION INTRAVENOUS at 20:39

## 2022-02-14 RX ADMIN — STANDARDIZED SENNA CONCENTRATE 17.2 MG: 8.6 TABLET ORAL at 21:05

## 2022-02-14 RX ADMIN — OXYCODONE HYDROCHLORIDE 5 MG: 5 TABLET ORAL at 09:29

## 2022-02-14 RX ADMIN — ESTRADIOL 2 MG: 1 TABLET ORAL at 09:29

## 2022-02-14 RX ADMIN — SODIUM CHLORIDE, SODIUM GLUCONATE, SODIUM ACETATE, POTASSIUM CHLORIDE, MAGNESIUM CHLORIDE, SODIUM PHOSPHATE, DIBASIC, AND POTASSIUM PHOSPHATE 500 ML: .53; .5; .37; .037; .03; .012; .00082 INJECTION, SOLUTION INTRAVENOUS at 11:35

## 2022-02-14 RX ADMIN — DOCUSATE SODIUM 100 MG: 100 CAPSULE ORAL at 17:06

## 2022-02-14 RX ADMIN — OXYCODONE HYDROCHLORIDE 5 MG: 5 TABLET ORAL at 14:47

## 2022-02-14 RX ADMIN — OXYCODONE HYDROCHLORIDE 5 MG: 5 TABLET ORAL at 21:05

## 2022-02-14 RX ADMIN — FAMOTIDINE 20 MG: 10 INJECTION INTRAVENOUS at 09:13

## 2022-02-14 RX ADMIN — ACETAMINOPHEN 975 MG: 325 TABLET, FILM COATED ORAL at 21:05

## 2022-02-14 RX ADMIN — OXYCODONE HYDROCHLORIDE 5 MG: 5 TABLET ORAL at 04:39

## 2022-02-14 RX ADMIN — ACETAMINOPHEN 975 MG: 325 TABLET, FILM COATED ORAL at 14:24

## 2022-02-14 RX ADMIN — ACETAMINOPHEN 975 MG: 325 TABLET, FILM COATED ORAL at 09:29

## 2022-02-14 RX ADMIN — DOCUSATE SODIUM 100 MG: 100 CAPSULE ORAL at 09:30
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
## 2022-02-14 NOTE — ASSESSMENT & PLAN NOTE
· Complaining of right ankle pain, we brought on by ambulating  · No significant tenderness or pain with manipulation  · XR foot and ankle: No acute osseous abnormalities
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
## 2022-02-14 NOTE — ASSESSMENT & PLAN NOTE
· Hemoglobin down this am to 6 5 patient   · Pt reporting episodes of dizziness with ambulation  · Pt transfused with 1 unit  · Continue to monitor for hemodynamic instability
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
## 2022-02-15 ENCOUNTER — TRANSITIONAL CARE MANAGEMENT (OUTPATIENT)
Dept: FAMILY MEDICINE CLINIC | Facility: CLINIC | Age: 52
End: 2022-02-15

## 2022-02-15 VITALS
DIASTOLIC BLOOD PRESSURE: 70 MMHG | SYSTOLIC BLOOD PRESSURE: 122 MMHG | RESPIRATION RATE: 17 BRPM | OXYGEN SATURATION: 95 % | HEIGHT: 67 IN | HEART RATE: 72 BPM | WEIGHT: 194.6 LBS | BODY MASS INDEX: 30.54 KG/M2 | TEMPERATURE: 98.3 F

## 2022-02-15 LAB
ABO GROUP BLD BPU: NORMAL
BASOPHILS # BLD AUTO: 0.02 THOUSANDS/ΜL (ref 0–0.1)
BASOPHILS NFR BLD AUTO: 0 % (ref 0–1)
BPU ID: NORMAL
CROSSMATCH: NORMAL
EOSINOPHIL # BLD AUTO: 0.52 THOUSAND/ΜL (ref 0–0.61)
EOSINOPHIL NFR BLD AUTO: 11 % (ref 0–6)
ERYTHROCYTE [DISTWIDTH] IN BLOOD BY AUTOMATED COUNT: 14.1 % (ref 11.6–15.1)
HCT VFR BLD AUTO: 25.3 % (ref 34.8–46.1)
HGB BLD-MCNC: 7.9 G/DL (ref 11.5–15.4)
IMM GRANULOCYTES # BLD AUTO: 0.03 THOUSAND/UL (ref 0–0.2)
IMM GRANULOCYTES NFR BLD AUTO: 1 % (ref 0–2)
LYMPHOCYTES # BLD AUTO: 0.93 THOUSANDS/ΜL (ref 0.6–4.47)
LYMPHOCYTES NFR BLD AUTO: 20 % (ref 14–44)
MCH RBC QN AUTO: 28.6 PG (ref 26.8–34.3)
MCHC RBC AUTO-ENTMCNC: 31.2 G/DL (ref 31.4–37.4)
MCV RBC AUTO: 92 FL (ref 82–98)
MONOCYTES # BLD AUTO: 0.47 THOUSAND/ΜL (ref 0.17–1.22)
MONOCYTES NFR BLD AUTO: 10 % (ref 4–12)
NEUTROPHILS # BLD AUTO: 2.7 THOUSANDS/ΜL (ref 1.85–7.62)
NEUTS SEG NFR BLD AUTO: 58 % (ref 43–75)
NRBC BLD AUTO-RTO: 0 /100 WBCS
PLATELET # BLD AUTO: 228 THOUSANDS/UL (ref 149–390)
PMV BLD AUTO: 11 FL (ref 8.9–12.7)
RBC # BLD AUTO: 2.76 MILLION/UL (ref 3.81–5.12)
UNIT DISPENSE STATUS: NORMAL
UNIT PRODUCT CODE: NORMAL
UNIT PRODUCT VOLUME: 350 ML
UNIT RH: NORMAL
WBC # BLD AUTO: 4.67 THOUSAND/UL (ref 4.31–10.16)

## 2022-02-15 PROCEDURE — 99238 HOSP IP/OBS DSCHRG MGMT 30/<: CPT | Performed by: SURGERY

## 2022-02-15 PROCEDURE — NC001 PR NO CHARGE: Performed by: PHYSICIAN ASSISTANT

## 2022-02-15 PROCEDURE — 85025 COMPLETE CBC W/AUTO DIFF WBC: CPT | Performed by: PHYSICIAN ASSISTANT

## 2022-02-15 RX ORDER — DOCUSATE SODIUM 100 MG/1
100 CAPSULE, LIQUID FILLED ORAL 2 TIMES DAILY
Refills: 0
Start: 2022-02-15 | End: 2022-03-24 | Stop reason: ALTCHOICE

## 2022-02-15 RX ORDER — OXYCODONE HYDROCHLORIDE 5 MG/1
5 TABLET ORAL EVERY 4 HOURS PRN
Qty: 18 TABLET | Refills: 0 | Status: SHIPPED | OUTPATIENT
Start: 2022-02-15 | End: 2022-02-22

## 2022-02-15 RX ORDER — FAMOTIDINE 20 MG/1
20 TABLET, FILM COATED ORAL EVERY 12 HOURS
Status: DISCONTINUED | OUTPATIENT
Start: 2022-02-15 | End: 2022-02-15 | Stop reason: HOSPADM

## 2022-02-15 RX ORDER — POLYETHYLENE GLYCOL 3350 17 G/17G
17 POWDER, FOR SOLUTION ORAL ONCE
Status: COMPLETED | OUTPATIENT
Start: 2022-02-15 | End: 2022-02-15

## 2022-02-15 RX ORDER — SENNOSIDES 8.6 MG
17.2 TABLET ORAL
Refills: 0
Start: 2022-02-15 | End: 2022-03-24 | Stop reason: ALTCHOICE

## 2022-02-15 RX ORDER — ACETAMINOPHEN 325 MG/1
650 TABLET ORAL EVERY 6 HOURS PRN
Refills: 0
Start: 2022-02-15

## 2022-02-15 RX ADMIN — DOCUSATE SODIUM 100 MG: 100 CAPSULE ORAL at 09:32

## 2022-02-15 RX ADMIN — OXYCODONE HYDROCHLORIDE 5 MG: 5 TABLET ORAL at 11:34

## 2022-02-15 RX ADMIN — ACETAMINOPHEN 975 MG: 325 TABLET, FILM COATED ORAL at 05:30

## 2022-02-15 RX ADMIN — FAMOTIDINE 20 MG: 20 TABLET, FILM COATED ORAL at 09:32

## 2022-02-15 RX ADMIN — ESTRADIOL 2 MG: 1 TABLET ORAL at 09:35

## 2022-02-15 RX ADMIN — POLYETHYLENE GLYCOL 3350 17 G: 17 POWDER, FOR SOLUTION ORAL at 09:32

## 2022-02-15 RX ADMIN — ENOXAPARIN SODIUM 30 MG: 30 INJECTION SUBCUTANEOUS at 09:32

## 2022-02-15 NOTE — ASSESSMENT & PLAN NOTE
· Hemoglobin down this am to 7 8 to 7 9  · Pt reporting episodes of dizziness with ambulation have resolved  · Continue to monitor for hemodynamic instability
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
## 2022-02-15 NOTE — INCIDENTAL FINDINGS
The following findings require follow up:  Radiographic finding   Findin  LIVER/BILIARY TREE:  Cyst adjacent to middle hepatic vein measuring 1 4 cm  Follow up required: Yes   Follow up should be done within 2-4 week(s)    Please notify the following clinician to assist with the follow up:   Primary Care Provider  Discussed with patient at bedside

## 2022-02-15 NOTE — PLAN OF CARE
Problem: MOBILITY - ADULT  Goal: Maintain or return to baseline ADL function  Description: INTERVENTIONS:  -  Assess patient's ability to carry out ADLs; assess patient's baseline for ADL function and identify physical deficits which impact ability to perform ADLs (bathing, care of mouth/teeth, toileting, grooming, dressing, etc )  - Assess/evaluate cause of self-care deficits   - Assess range of motion  - Assess patient's mobility; develop plan if impaired  - Assess patient's need for assistive devices and provide as appropriate  - Encourage maximum independence but intervene and supervise when necessary  - Involve family in performance of ADLs  - Assess for home care needs following discharge   - Consider OT consult to assist with ADL evaluation and planning for discharge  - Provide patient education as appropriate  2/15/2022 1329 by Karolyn Parsons RN  Outcome: Completed  2/15/2022 1328 by Karolyn Parsons RN  Outcome: Progressing  Goal: Maintains/Returns to pre admission functional level  Description: INTERVENTIONS:  - Perform BMAT or MOVE assessment daily    - Set and communicate daily mobility goal to care team and patient/family/caregiver  - Collaborate with rehabilitation services on mobility goals if consulted  - Perform Range of Motion 3 times a day  - Reposition patient every 2 hours    - Dangle patient 3 times a day  - Stand patient 3 times a day  - Ambulate patient 3 times a day  - Out of bed to chair 3 times a day   - Out of bed for meals 3 times a day  - Out of bed for toileting  - Record patient progress and toleration of activity level   2/15/2022 1329 by Karolyn Parsons RN  Outcome: Completed  2/15/2022 1328 by Karolyn Parsons RN  Outcome: Progressing     Problem: Potential for Falls  Goal: Patient will remain free of falls  Description: INTERVENTIONS:  - Educate patient/family on patient safety including physical limitations  - Instruct patient to call for assistance with activity   - Consult OT/PT to assist with strengthening/mobility   - Keep Call bell within reach  - Keep bed low and locked with side rails adjusted as appropriate  - Keep care items and personal belongings within reach  - Initiate and maintain comfort rounds  - Make Fall Risk Sign visible to staff  - Offer Toileting every 2 Hours, in advance of need  - Initiate/Maintain alarm  - Obtain necessary fall risk management equipment:   - Apply yellow socks and bracelet for high fall risk patients  - Consider moving patient to room near nurses station  2/15/2022 1329 by Anabelle Flynn RN  Outcome: Completed  2/15/2022 1328 by Anabelle Flynn RN  Outcome: Progressing     Problem: Prexisting or High Potential for Compromised Skin Integrity  Goal: Skin integrity is maintained or improved  Description: INTERVENTIONS:  - Identify patients at risk for skin breakdown  - Assess and monitor skin integrity  - Assess and monitor nutrition and hydration status  - Monitor labs   - Assess for incontinence   - Turn and reposition patient  - Assist with mobility/ambulation  - Relieve pressure over bony prominences  - Avoid friction and shearing  - Provide appropriate hygiene as needed including keeping skin clean and dry  - Evaluate need for skin moisturizer/barrier cream  - Collaborate with interdisciplinary team   - Patient/family teaching  - Consider wound care consult   2/15/2022 1329 by Anabelle Flynn RN  Outcome: Completed  2/15/2022 1328 by Anabelle Flynn RN  Outcome: Progressing     Problem: Nutrition/Hydration-ADULT  Goal: Nutrient/Hydration intake appropriate for improving, restoring or maintaining nutritional needs  Description: Monitor and assess patient's nutrition/hydration status for malnutrition  Collaborate with interdisciplinary team and initiate plan and interventions as ordered  Monitor patient's weight and dietary intake as ordered or per policy  Utilize nutrition screening tool and intervene as necessary   Determine patient's food preferences and provide high-protein, high-caloric foods as appropriate       INTERVENTIONS:  - Monitor oral intake, urinary output, labs, and treatment plans  - Assess nutrition and hydration status and recommend course of action  - Evaluate amount of meals eaten  - Assist patient with eating if necessary   - Allow adequate time for meals  - Recommend/ encourage appropriate diets, oral nutritional supplements, and vitamin/mineral supplements  - Order, calculate, and assess calorie counts as needed  - Recommend, monitor, and adjust tube feedings and TPN/PPN based on assessed needs  - Assess need for intravenous fluids  - Provide specific nutrition/hydration education as appropriate  - Include patient/family/caregiver in decisions related to nutrition  2/15/2022 1329 by Fozia Fernandez RN  Outcome: Completed  2/15/2022 1328 by Fozia Fernandez RN  Outcome: Progressing

## 2022-02-15 NOTE — PROGRESS NOTES
The famotidine has been converted to Oral per [facility] IV-to-PO Auto-Conversion Protocol for Adults as approved by the Pharmacy and Therapeutics Committee  The patient met all eligible criteria:  3 Age = 25years old   2) Received at least one dose of the IV form   3) Receiving at least one other scheduled oral/enteral medication   4) Tolerating an oral/enteral diet   and did not have any exclusions:   1) Critical care patient   2) Active GI bleed (IF assessing H2RAs or PPIs)   3) Continuous tube feeding (IF assessing cipro, doxycycline, levofloxacin, minocycline, rifampin, or voriconazole)   4) Receiving PO vancomycin (IF assessing metronidazole)   5) Persistent nausea and/or vomiting   6) Ileus or gastrointestinal obstruction   7) Carmen/nasogastric tube set for continuous suction   8) Specific order not to automatically convert to PO (in the order's comments or if discussed in the most recent Infectious Disease or primary team's progress notes)
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
## 2022-02-16 NOTE — UTILIZATION REVIEW
Notification of Discharge   This is a Notification of Discharge from our facility [address]  Please be advised that this patient has been discharge from our facility  Below you will find the admission and discharge date and time including the patients disposition  UTILIZATION REVIEW CONTACT:  Rosi Butler  Utilization   Network Utilization Review Department  Phone: [phone] x carefully listen to the prompts  All voicemails are confidential   Email: [email or web address]     PHYSICIAN ADVISORY SERVICES:  FOR [id number] REVIEW - MEDICAL NECESSITY DENIAL  Phone: [phone]  Fax: [phone]  Email: [email or web address]     PRESENTATION DATE: 2/8/2022  5:17 PM  OBERVATION ADMISSION DATE:   INPATIENT ADMISSION DATE: 2/8/22  7:20 PM   DISCHARGE DATE: 2/15/2022  2:38 PM  DISPOSITION: Home/Self Care Home/Self Care      IMPORTANT INFORMATION:  Send all requests for admission clinical reviews, approved or denied determinations and any other requests to dedicated fax number below belonging to the campus where the patient is receiving treatment   List of dedicated fax numbers:  [address] DENIALS (Administrative/Medical Necessity) [phone]   [address] (Maternity/NICU/Pediatrics) [phone]   Olvin Yani [phone]   [place] [phone]   Kehinde Interiano [phone]   [address] [place] [address],[address] [phone]   [facility]  [phone]   [address] [place] [address]  [address] [facility] [address] [facility] [phone]

## 2022-02-17 LAB
ATRIAL RATE: 68 BPM
P AXIS: 41 DEGREES
PR INTERVAL: 150 MS
QRS AXIS: 53 DEGREES
QRSD INTERVAL: 92 MS
QT INTERVAL: 396 MS
QTC INTERVAL: 421 MS
T WAVE AXIS: 12 DEGREES
VENTRICULAR RATE: 68 BPM

## 2022-02-17 PROCEDURE — 93010 ELECTROCARDIOGRAM REPORT: CPT | Performed by: INTERNAL MEDICINE

## 2022-02-19 ENCOUNTER — NURSE TRIAGE (OUTPATIENT)
Dept: OTHER | Facility: OTHER | Age: 52
End: 2022-02-19
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
## 2022-02-19 NOTE — TELEPHONE ENCOUNTER
Reason for Disposition   [1] MODERATE diarrhea (e g , 4-6 times / day more than normal) AND [2] present > 48 hours (2 days)    Answer Assessment - Initial Assessment Questions  1  DIARRHEA SEVERITY: "How bad is the diarrhea?" "How many extra stools have you had in the past 24 hours than normal?"     - NO DIARRHEA (SCALE 0)    - MILD (SCALE 1-3): Few loose or mushy BMs; increase of 1-3 stools over normal daily number of stools; mild increase in ostomy output  -  MODERATE (SCALE 4-7): Increase of 4-6 stools daily over normal; moderate increase in ostomy output  * SEVERE (SCALE 8-10; OR 'WORST POSSIBLE'): Increase of 7 or more stools daily over normal; moderate increase in ostomy output; incontinence  Moderate diarrhea  2  ONSET: "When did the diarrhea begin?"       2/16 in the morning   3  BM CONSISTENCY: "How loose or watery is the diarrhea?"       Watery mustard   4  VOMITING: "Are you also vomiting?" If Yes, ask: "How many times in the past 24 hours?"       Denies  5  ABDOMINAL PAIN: "Are you having any abdominal pain?" If Yes, ask: "What does it feel like?" (e g , crampy, dull, intermittent, constant)      Intermittent cramping only when in need to have BM   6  ABDOMINAL PAIN SEVERITY: If present, ask: "How bad is the pain?"  (e g , Scale 1-10; mild, moderate, or severe)    - MILD (1-3): doesn't interfere with normal activities, abdomen soft and not tender to touch     - MODERATE (4-7): interferes with normal activities or awakens from sleep, tender to touch     - SEVERE (8-10): excruciating pain, doubled over, unable to do any normal activities        Mild 2-3/10 intermittent  7  ORAL INTAKE: If vomiting, "Have you been able to drink liquids?" "How much fluids have you had in the past 24 hours?"     90 ounces a day   8   HYDRATION: "Any signs of dehydration?" (e g , dry mouth [not just dry lips], too weak to stand, dizziness, new weight loss) "When did you last urinate?"     Light,headed, New weight loss  Dizziness  Urinated this morning  9  EXPOSURE: "Have you traveled to a foreign country recently?" "Have you been exposed to anyone with diarrhea?" "Could you have eaten any food that was spoiled?"      Denies  10  ANTIBIOTIC USE: "Are you taking antibiotics now or have you taken antibiotics in the past 2 months?"        Denies  11   OTHER SYMPTOMS: "Do you have any other symptoms?" (e g , fever, blood in stool)        Light-headed, Weight loss    Protocols used: DIARRHEA-ADULT-AH
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
## 2022-02-21 ENCOUNTER — OFFICE VISIT (OUTPATIENT)
Dept: SURGERY | Facility: CLINIC | Age: 52
End: 2022-02-21

## 2022-02-21 VITALS — TEMPERATURE: 97.6 F | HEIGHT: 67 IN | BODY MASS INDEX: 28.56 KG/M2 | WEIGHT: 182 LBS
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
## 2022-02-21 PROCEDURE — 99024 POSTOP FOLLOW-UP VISIT: CPT | Performed by: SURGERY
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
## 2022-02-21 NOTE — PROGRESS NOTES
Office Visit - General Surgery  Deny Stout MRN: [id number]  Encounter: [id number]    Assessment and Plan  Problem List Items Addressed This Visit     None          Chief Complaint:  Deny Stout is a 46 y o  female who presents for Post-op (p/o Bowel resection )    Subjective      History reviewed  No pertinent past medical history  Past Surgical History:   Procedure Laterality Date    APPENDECTOMY      Last assessed 2016     HYSTERECTOMY      HYSTEROSCOPY W/ ENDOMETRIAL ABLATION      Last assessed 12/15/2014     LAPAROTOMY N/A 2022    Procedure: LAPAROTOMY EXPLORATORY, EXTENSIVE SMALL BOWEL RESECTION;  Surgeon: Carlos Quigley MD;  [facility];  Service: General    OOPHORECTOMY      TUBAL LIGATION      Last assessed 12/15/2014        Family History   Problem Relation Age of Onset   [place] Breast cancer Mother 58    Motor neuron disease Mother     Breast cancer Paternal Grandmother 76    Cancer Father         Neck cancer    [place]'s disease Family     Colon cancer Maternal Aunt     Breast cancer Maternal Aunt 80    Basal cell carcinoma Sister     Basal cell carcinoma Brother        Social History     Tobacco Use    Smoking status: Former Smoker     Quit date:      Years since quittin 1    Smokeless tobacco: Never Used   Vaping Use    Vaping Use: Never used   Substance Use Topics    Alcohol use:  Yes    Drug use: No        Medications  Current Outpatient Medications on File Prior to Visit   Medication Sig Dispense Refill    acetaminophen (TYLENOL) 325 mg tablet Take 2 tablets (650 mg total) by mouth every 6 (six) hours as needed for mild pain  0    aspirin 81 mg chewable tablet Chew 1 tablet daily      calcium citrate-vitamin D (CITRACAL+D) 315-200 MG-UNIT per tablet Take 1 tablet by mouth daily      docusate sodium (COLACE) 100 mg capsule Take 1 capsule (100 mg total) by mouth 2 (two) times a day  0    estradiol (ESTRACE) 2 MG tablet Take 1 tablet by mouth daily      famotidine (PEPCID) 40 MG tablet Take 1 tablet (40 mg total) by mouth daily at bedtime 30 tablet 3    Magnesium 500 MG CAPS Take by mouth 2 (two) times a day        omeprazole (PriLOSEC) 40 MG capsule Take 1 capsule (40 mg total) by mouth daily 30 min or more prior to eating or drinking in the morning 30 capsule 3    oxyCODONE (ROXICODONE) 5 immediate release tablet Take 1 tablet (5 mg total) by mouth every 4 (four) hours as needed for moderate pain for up to 10 days Max Daily Amount: 30 mg 18 tablet 0    senna (SENOKOT) 8 6 mg Take 2 tablets (17 2 mg total) by mouth daily at bedtime  0     No current facility-administered medications on file prior to visit         Allergies  No Known Allergies    Review of Systems    Objective  Vitals:    02/21/22 0934   Temp: 97 6 °F (36 4 °C)       Physical Exam
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
## 2022-02-22 ENCOUNTER — OFFICE VISIT (OUTPATIENT)
Dept: FAMILY MEDICINE CLINIC | Facility: CLINIC | Age: 52
End: 2022-02-22
Payer: COMMERCIAL

## 2022-02-22 VITALS
RESPIRATION RATE: 16 BRPM | DIASTOLIC BLOOD PRESSURE: 70 MMHG | WEIGHT: 179 LBS | SYSTOLIC BLOOD PRESSURE: 110 MMHG | HEART RATE: 82 BPM | BODY MASS INDEX: 28.09 KG/M2 | TEMPERATURE: 99.6 F | HEIGHT: 67 IN

## 2022-02-22 DIAGNOSIS — M25.571 ACUTE RIGHT ANKLE PAIN: ICD-10-CM

## 2022-02-22 DIAGNOSIS — F41.8 SITUATIONAL ANXIETY: ICD-10-CM

## 2022-02-22 DIAGNOSIS — D64.9 ANEMIA, UNSPECIFIED TYPE: Primary | ICD-10-CM

## 2022-02-22 DIAGNOSIS — K55.069 MESENTERIC VENOUS THROMBOSIS (HCC): ICD-10-CM

## 2022-02-22 DIAGNOSIS — K55.9 SMALL BOWEL ISCHEMIA (HCC): ICD-10-CM

## 2022-02-22 DIAGNOSIS — F51.02 ADJUSTMENT INSOMNIA: ICD-10-CM

## 2022-02-22 DIAGNOSIS — R22.1 MASS OF LATERAL NECK: ICD-10-CM

## 2022-02-22 PROCEDURE — 99495 TRANSJ CARE MGMT MOD F2F 14D: CPT | Performed by: FAMILY MEDICINE

## 2022-02-22 RX ORDER — ALPRAZOLAM 0.25 MG/1
0.25 TABLET ORAL EVERY 6 HOURS PRN
Qty: 40 TABLET | Refills: 0 | Status: SHIPPED | OUTPATIENT
Start: 2022-02-22

## 2022-02-22 RX ORDER — ZOLPIDEM TARTRATE 10 MG/1
10 TABLET ORAL
Qty: 30 TABLET | Refills: 0 | Status: SHIPPED | OUTPATIENT
Start: 2022-02-22 | End: 2022-04-26
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
## 2022-02-23 ENCOUNTER — APPOINTMENT (OUTPATIENT)
Dept: LAB | Facility: HOSPITAL | Age: 52
End: 2022-02-23
Attending: FAMILY MEDICINE
Payer: COMMERCIAL

## 2022-02-23 DIAGNOSIS — D50.8 OTHER IRON DEFICIENCY ANEMIA: Primary | ICD-10-CM

## 2022-02-23 DIAGNOSIS — D64.9 ANEMIA, UNSPECIFIED TYPE: ICD-10-CM

## 2022-02-23 PROBLEM — R52 ACUTE PAIN: Status: RESOLVED | Noted: 2022-02-09 | Resolved: 2022-02-23

## 2022-02-23 LAB
ALBUMIN SERPL BCP-MCNC: 3.3 G/DL (ref 3.5–5)
ALP SERPL-CCNC: 110 U/L (ref 46–116)
ALT SERPL W P-5'-P-CCNC: 69 U/L (ref 12–78)
ANION GAP SERPL CALCULATED.3IONS-SCNC: 9 MMOL/L (ref 4–13)
AST SERPL W P-5'-P-CCNC: 44 U/L (ref 5–45)
BASOPHILS # BLD AUTO: 0.05 THOUSANDS/ΜL (ref 0–0.1)
BASOPHILS NFR BLD AUTO: 1 % (ref 0–1)
BILIRUB SERPL-MCNC: 0.45 MG/DL (ref 0.2–1)
BUN SERPL-MCNC: 14 MG/DL (ref 5–25)
CALCIUM ALBUM COR SERPL-MCNC: 10.2 MG/DL (ref 8.3–10.1)
CALCIUM SERPL-MCNC: 9.6 MG/DL (ref 8.3–10.1)
CHLORIDE SERPL-SCNC: 105 MMOL/L (ref 100–108)
CO2 SERPL-SCNC: 27 MMOL/L (ref 21–32)
CREAT SERPL-MCNC: 0.73 MG/DL (ref 0.6–1.3)
EOSINOPHIL # BLD AUTO: 0.36 THOUSAND/ΜL (ref 0–0.61)
EOSINOPHIL NFR BLD AUTO: 4 % (ref 0–6)
ERYTHROCYTE [DISTWIDTH] IN BLOOD BY AUTOMATED COUNT: 13.7 % (ref 11.6–15.1)
GFR SERPL CREATININE-BSD FRML MDRD: 94 ML/MIN/1.73SQ M
GLUCOSE SERPL-MCNC: 97 MG/DL (ref 65–140)
HCT VFR BLD AUTO: 32.8 % (ref 34.8–46.1)
HGB BLD-MCNC: 10.1 G/DL (ref 11.5–15.4)
IMM GRANULOCYTES # BLD AUTO: 0.05 THOUSAND/UL (ref 0–0.2)
IMM GRANULOCYTES NFR BLD AUTO: 1 % (ref 0–2)
IRON SATN MFR SERPL: 6 % (ref 15–50)
IRON SERPL-MCNC: 20 UG/DL (ref 50–170)
LYMPHOCYTES # BLD AUTO: 1.16 THOUSANDS/ΜL (ref 0.6–4.47)
LYMPHOCYTES NFR BLD AUTO: 12 % (ref 14–44)
MCH RBC QN AUTO: 28.1 PG (ref 26.8–34.3)
MCHC RBC AUTO-ENTMCNC: 30.8 G/DL (ref 31.4–37.4)
MCV RBC AUTO: 91 FL (ref 82–98)
MONOCYTES # BLD AUTO: 0.57 THOUSAND/ΜL (ref 0.17–1.22)
MONOCYTES NFR BLD AUTO: 6 % (ref 4–12)
NEUTROPHILS # BLD AUTO: 7.8 THOUSANDS/ΜL (ref 1.85–7.62)
NEUTS SEG NFR BLD AUTO: 76 % (ref 43–75)
NRBC BLD AUTO-RTO: 0 /100 WBCS
PLATELET # BLD AUTO: 311 THOUSANDS/UL (ref 149–390)
PMV BLD AUTO: 11.1 FL (ref 8.9–12.7)
POTASSIUM SERPL-SCNC: 4 MMOL/L (ref 3.5–5.3)
PROT SERPL-MCNC: 7.7 G/DL (ref 6.4–8.2)
RBC # BLD AUTO: 3.6 MILLION/UL (ref 3.81–5.12)
SODIUM SERPL-SCNC: 141 MMOL/L (ref 136–145)
TIBC SERPL-MCNC: 359 UG/DL (ref 250–450)
WBC # BLD AUTO: 9.99 THOUSAND/UL (ref 4.31–10.16)

## 2022-02-23 PROCEDURE — 80053 COMPREHEN METABOLIC PANEL: CPT

## 2022-02-23 PROCEDURE — 36415 COLL VENOUS BLD VENIPUNCTURE: CPT

## 2022-02-23 PROCEDURE — 85025 COMPLETE CBC W/AUTO DIFF WBC: CPT

## 2022-02-23 PROCEDURE — 83540 ASSAY OF IRON: CPT

## 2022-02-23 PROCEDURE — 83550 IRON BINDING TEST: CPT

## 2022-02-23 RX ORDER — FERROUS SULFATE TAB EC 324 MG (65 MG FE EQUIVALENT) 324 (65 FE) MG
324 TABLET DELAYED RESPONSE ORAL
Qty: 90 TABLET | Refills: 5 | Status: SHIPPED | OUTPATIENT
Start: 2022-02-23 | End: 2022-04-25

## 2022-02-24 ENCOUNTER — TELEPHONE (OUTPATIENT)
Dept: FAMILY MEDICINE CLINIC | Facility: CLINIC | Age: 52
End: 2022-02-24
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
## 2022-02-28 ENCOUNTER — APPOINTMENT (OUTPATIENT)
Dept: RADIOLOGY | Facility: CLINIC | Age: 52
End: 2022-02-28
Payer: COMMERCIAL

## 2022-02-28 ENCOUNTER — OFFICE VISIT (OUTPATIENT)
Dept: OBGYN CLINIC | Facility: CLINIC | Age: 52
End: 2022-02-28
Payer: COMMERCIAL

## 2022-02-28 ENCOUNTER — TELEPHONE (OUTPATIENT)
Dept: SURGERY | Facility: CLINIC | Age: 52
End: 2022-02-28

## 2022-02-28 VITALS
WEIGHT: 179 LBS | TEMPERATURE: 97.8 F | BODY MASS INDEX: 28.09 KG/M2 | DIASTOLIC BLOOD PRESSURE: 88 MMHG | HEART RATE: 83 BPM | SYSTOLIC BLOOD PRESSURE: 129 MMHG | HEIGHT: 67 IN

## 2022-02-28 DIAGNOSIS — M17.11 PATELLOFEMORAL ARTHRITIS OF RIGHT KNEE: Primary | ICD-10-CM

## 2022-02-28 DIAGNOSIS — S92.334A NONDISPLACED FRACTURE OF THIRD METATARSAL BONE, RIGHT FOOT, INITIAL ENCOUNTER FOR CLOSED FRACTURE: ICD-10-CM
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
## 2022-02-28 DIAGNOSIS — S80.01XA PATELLAR CONTUSION, RIGHT, INITIAL ENCOUNTER: ICD-10-CM

## 2022-02-28 DIAGNOSIS — M79.671 PAIN IN RIGHT FOOT: ICD-10-CM

## 2022-02-28 PROCEDURE — 3008F BODY MASS INDEX DOCD: CPT | Performed by: OTOLARYNGOLOGY

## 2022-02-28 PROCEDURE — 99204 OFFICE O/P NEW MOD 45 MIN: CPT | Performed by: ORTHOPAEDIC SURGERY

## 2022-02-28 PROCEDURE — 73630 X-RAY EXAM OF FOOT: CPT

## 2022-02-28 NOTE — TELEPHONE ENCOUNTER
Patient called to inform Dmitriy Amador that he [facility] paperwork was not completed correctly  I will inform Dmitriy Amador to call the patient  Update provided to Jennifer
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
## 2022-03-01 ENCOUNTER — HOSPITAL ENCOUNTER (OUTPATIENT)
Dept: RADIOLOGY | Facility: HOSPITAL | Age: 52
Discharge: HOME | End: 2022-03-01
Attending: FAMILY MEDICINE
Payer: COMMERCIAL

## 2022-03-01 DIAGNOSIS — R22.1 MASS OF LATERAL NECK: ICD-10-CM

## 2022-03-01 PROCEDURE — 76536 US EXAM OF HEAD AND NECK: CPT

## 2022-03-07 ENCOUNTER — OFFICE VISIT (OUTPATIENT)
Dept: SURGERY | Facility: CLINIC | Age: 52
End: 2022-03-07

## 2022-03-07 VITALS — BODY MASS INDEX: 28.41 KG/M2 | WEIGHT: 181 LBS | HEIGHT: 67 IN | TEMPERATURE: 97.3 F
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
## 2022-03-07 PROCEDURE — 99024 POSTOP FOLLOW-UP VISIT: CPT | Performed by: STUDENT IN AN ORGANIZED HEALTH CARE EDUCATION/TRAINING PROGRAM
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
## 2022-03-08 ENCOUNTER — APPOINTMENT (OUTPATIENT)
Dept: LAB | Facility: CLINIC | Age: 52
End: 2022-03-08
Payer: COMMERCIAL

## 2022-03-08 DIAGNOSIS — Z02.1 PRE-EMPLOYMENT EXAMINATION: ICD-10-CM

## 2022-03-08 LAB — RUBV IGG SERPL IA-ACNC: >175 IU/ML

## 2022-03-08 PROCEDURE — 86735 MUMPS ANTIBODY: CPT

## 2022-03-08 PROCEDURE — 86787 VARICELLA-ZOSTER ANTIBODY: CPT

## 2022-03-08 PROCEDURE — 86706 HEP B SURFACE ANTIBODY: CPT

## 2022-03-08 PROCEDURE — 86765 RUBEOLA ANTIBODY: CPT

## 2022-03-08 PROCEDURE — 86480 TB TEST CELL IMMUN MEASURE: CPT

## 2022-03-08 PROCEDURE — 36415 COLL VENOUS BLD VENIPUNCTURE: CPT

## 2022-03-08 PROCEDURE — 86762 RUBELLA ANTIBODY: CPT

## 2022-03-09 LAB
HBV SURFACE AB SER-ACNC: >1000 MIU/ML
MEV IGG SER QL: NORMAL

## 2022-03-10 LAB
GAMMA INTERFERON BACKGROUND BLD IA-ACNC: 0.03 IU/ML
M TB IFN-G BLD-IMP: NEGATIVE
M TB IFN-G CD4+ BCKGRND COR BLD-ACNC: 0 IU/ML
M TB IFN-G CD4+ BCKGRND COR BLD-ACNC: 0.01 IU/ML
MITOGEN IGNF BCKGRD COR BLD-ACNC: 3.13 IU/ML
MUV IGG SER QL: ABNORMAL
VZV IGG SER IA-ACNC: NORMAL

## 2022-03-23 ENCOUNTER — TELEPHONE (OUTPATIENT)
Dept: GASTROENTEROLOGY | Facility: CLINIC | Age: 52
End: 2022-03-23

## 2022-03-23 ENCOUNTER — TELEPHONE (OUTPATIENT)
Dept: FAMILY MEDICINE CLINIC | Facility: CLINIC | Age: 52
End: 2022-03-23

## 2022-03-23 NOTE — TELEPHONE ENCOUNTER
Pt LMOM asking for OV appt  With Dr Rylie Fong  She is not due for colon until 2025  LM and asked her to return the call

## 2022-03-24 ENCOUNTER — CLINICAL SUPPORT (OUTPATIENT)
Dept: FAMILY MEDICINE CLINIC | Facility: CLINIC | Age: 52
End: 2022-03-24
Payer: COMMERCIAL

## 2022-03-24 ENCOUNTER — OFFICE VISIT (OUTPATIENT)
Dept: OBGYN CLINIC | Facility: CLINIC | Age: 52
End: 2022-03-24
Payer: COMMERCIAL

## 2022-03-24 ENCOUNTER — OFFICE VISIT (OUTPATIENT)
Dept: GASTROENTEROLOGY | Facility: CLINIC | Age: 52
End: 2022-03-24
Payer: COMMERCIAL

## 2022-03-24 VITALS
HEART RATE: 66 BPM | SYSTOLIC BLOOD PRESSURE: 153 MMHG | HEIGHT: 67 IN | WEIGHT: 181 LBS | DIASTOLIC BLOOD PRESSURE: 92 MMHG | BODY MASS INDEX: 28.41 KG/M2

## 2022-03-24 VITALS
SYSTOLIC BLOOD PRESSURE: 155 MMHG | DIASTOLIC BLOOD PRESSURE: 99 MMHG | BODY MASS INDEX: 28.41 KG/M2 | WEIGHT: 181 LBS | HEIGHT: 67 IN | HEART RATE: 65 BPM

## 2022-03-24 DIAGNOSIS — D64.9 ANEMIA, UNSPECIFIED TYPE: ICD-10-CM

## 2022-03-24 DIAGNOSIS — Z90.49 HISTORY OF BOWEL RESECTION: ICD-10-CM

## 2022-03-24 DIAGNOSIS — R19.7 DIARRHEA, UNSPECIFIED TYPE: Primary | ICD-10-CM

## 2022-03-24 DIAGNOSIS — M25.561 ACUTE PAIN OF RIGHT KNEE: ICD-10-CM

## 2022-03-24 DIAGNOSIS — Z23 NEED FOR MMR VACCINE: Primary | ICD-10-CM

## 2022-03-24 DIAGNOSIS — S92.334A NONDISPLACED FRACTURE OF THIRD METATARSAL BONE, RIGHT FOOT, INITIAL ENCOUNTER FOR CLOSED FRACTURE: Primary | ICD-10-CM
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
## 2022-03-24 PROCEDURE — 99214 OFFICE O/P EST MOD 30 MIN: CPT | Performed by: ORTHOPAEDIC SURGERY

## 2022-03-24 PROCEDURE — 90471 IMMUNIZATION ADMIN: CPT

## 2022-03-24 PROCEDURE — 90707 MMR VACCINE SC: CPT

## 2022-03-24 PROCEDURE — 99204 OFFICE O/P NEW MOD 45 MIN: CPT | Performed by: INTERNAL MEDICINE
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
## 2022-03-25 ENCOUNTER — APPOINTMENT (OUTPATIENT)
Dept: LAB | Facility: HOSPITAL | Age: 52
End: 2022-03-25
Attending: FAMILY MEDICINE
Payer: COMMERCIAL

## 2022-03-25 DIAGNOSIS — D50.8 OTHER IRON DEFICIENCY ANEMIA: ICD-10-CM

## 2022-03-25 LAB
BASOPHILS # BLD AUTO: 0.03 THOUSANDS/ΜL (ref 0–0.1)
BASOPHILS NFR BLD AUTO: 1 % (ref 0–1)
EOSINOPHIL # BLD AUTO: 0.28 THOUSAND/ΜL (ref 0–0.61)
EOSINOPHIL NFR BLD AUTO: 8 % (ref 0–6)
ERYTHROCYTE [DISTWIDTH] IN BLOOD BY AUTOMATED COUNT: 15.7 % (ref 11.6–15.1)
HCT VFR BLD AUTO: 37.1 % (ref 34.8–46.1)
HGB BLD-MCNC: 11.8 G/DL (ref 11.5–15.4)
IMM GRANULOCYTES # BLD AUTO: 0.01 THOUSAND/UL (ref 0–0.2)
IMM GRANULOCYTES NFR BLD AUTO: 0 % (ref 0–2)
IRON SATN MFR SERPL: 13 % (ref 15–50)
IRON SERPL-MCNC: 49 UG/DL (ref 50–170)
LYMPHOCYTES # BLD AUTO: 0.99 THOUSANDS/ΜL (ref 0.6–4.47)
LYMPHOCYTES NFR BLD AUTO: 28 % (ref 14–44)
MCH RBC QN AUTO: 28.4 PG (ref 26.8–34.3)
MCHC RBC AUTO-ENTMCNC: 31.8 G/DL (ref 31.4–37.4)
MCV RBC AUTO: 89 FL (ref 82–98)
MONOCYTES # BLD AUTO: 0.32 THOUSAND/ΜL (ref 0.17–1.22)
MONOCYTES NFR BLD AUTO: 9 % (ref 4–12)
NEUTROPHILS # BLD AUTO: 1.9 THOUSANDS/ΜL (ref 1.85–7.62)
NEUTS SEG NFR BLD AUTO: 54 % (ref 43–75)
NRBC BLD AUTO-RTO: 0 /100 WBCS
PLATELET # BLD AUTO: 194 THOUSANDS/UL (ref 149–390)
PMV BLD AUTO: 11 FL (ref 8.9–12.7)
RBC # BLD AUTO: 4.16 MILLION/UL (ref 3.81–5.12)
TIBC SERPL-MCNC: 388 UG/DL (ref 250–450)
WBC # BLD AUTO: 3.53 THOUSAND/UL (ref 4.31–10.16)

## 2022-03-25 PROCEDURE — 83540 ASSAY OF IRON: CPT

## 2022-03-25 PROCEDURE — 83550 IRON BINDING TEST: CPT

## 2022-03-25 PROCEDURE — 36415 COLL VENOUS BLD VENIPUNCTURE: CPT

## 2022-03-25 PROCEDURE — 85025 COMPLETE CBC W/AUTO DIFF WBC: CPT

## 2022-03-29 ENCOUNTER — APPOINTMENT (OUTPATIENT)
Dept: LAB | Facility: HOSPITAL | Age: 52
End: 2022-03-29
Attending: INTERNAL MEDICINE
Payer: COMMERCIAL

## 2022-03-29 DIAGNOSIS — R19.7 DIARRHEA, UNSPECIFIED TYPE: ICD-10-CM

## 2022-03-29 PROCEDURE — 89055 LEUKOCYTE ASSESSMENT FECAL: CPT

## 2022-03-29 PROCEDURE — 82705 FATS/LIPIDS FECES QUAL: CPT

## 2022-03-30 ENCOUNTER — APPOINTMENT (OUTPATIENT)
Dept: LAB | Facility: HOSPITAL | Age: 52
End: 2022-03-30
Attending: INTERNAL MEDICINE
Payer: COMMERCIAL

## 2022-03-30 DIAGNOSIS — R19.7 DIARRHEA, UNSPECIFIED TYPE: ICD-10-CM

## 2022-03-30 LAB — WBC SPEC QL GRAM STN: NORMAL

## 2022-03-30 PROCEDURE — 89160 EXAM FECES FOR MEAT FIBERS: CPT

## 2022-03-30 PROCEDURE — 82705 FATS/LIPIDS FECES QUAL: CPT

## 2022-03-30 PROCEDURE — 87493 C DIFF AMPLIFIED PROBE: CPT

## 2022-03-31 ENCOUNTER — OFFICE VISIT (OUTPATIENT)
Dept: OTOLARYNGOLOGY | Facility: CLINIC | Age: 52
End: 2022-03-31
Payer: COMMERCIAL

## 2022-03-31 ENCOUNTER — TELEPHONE (OUTPATIENT)
Dept: OBGYN CLINIC | Facility: HOSPITAL | Age: 52
End: 2022-03-31

## 2022-03-31 VITALS — TEMPERATURE: 97.6 F | HEIGHT: 67 IN | BODY MASS INDEX: 27.47 KG/M2 | WEIGHT: 175 LBS

## 2022-03-31 DIAGNOSIS — K21.9 LARYNGOPHARYNGEAL REFLUX (LPR): ICD-10-CM

## 2022-03-31 LAB — C DIFF TOX GENS STL QL NAA+PROBE: NEGATIVE

## 2022-03-31 PROCEDURE — 99213 OFFICE O/P EST LOW 20 MIN: CPT | Performed by: OTOLARYNGOLOGY

## 2022-03-31 PROCEDURE — 3008F BODY MASS INDEX DOCD: CPT | Performed by: OTOLARYNGOLOGY

## 2022-03-31 RX ORDER — FAMOTIDINE 40 MG/1
40 TABLET, FILM COATED ORAL
Qty: 90 TABLET | Refills: 3 | Status: SHIPPED | OUTPATIENT
Start: 2022-03-31

## 2022-03-31 RX ORDER — OMEPRAZOLE 40 MG/1
40 CAPSULE, DELAYED RELEASE ORAL DAILY
Qty: 30 CAPSULE | Refills: 3 | Status: SHIPPED | OUTPATIENT
Start: 2022-03-31 | End: 2022-04-26

## 2022-03-31 NOTE — PROGRESS NOTES
Otolaryngology-- Head and Neck Surgery Follow up visit      CC: globus      Time interval of problem since last visit:  2 months    Pertinent interval elements of the history:  She returns after 2 months and reports almost complete improvement in reflux symptoms on omeprazole and nightly pepcid  Review of any relevant imaging:      Interval Review of systems: Pertinent review of systems documented in the HPI  Interval Social History:  Social History     Socioeconomic History    Marital status: /Civil Union     Spouse name: Not on file    Number of children: Not on file    Years of education: Not on file    Highest education level: Not on file   Occupational History    Not on file   Tobacco Use    Smoking status: Former Smoker     Quit date:      Years since quittin 2    Smokeless tobacco: Never Used   Vaping Use    Vaping Use: Never used   Substance and Sexual Activity    Alcohol use: Yes    Drug use: No    Sexual activity: Not on file   Other Topics Concern    Not on file   Social History Narrative    Former smoker - As per [employer]      Social Determinants of Health     Financial Resource Strain: Not on file   Food Insecurity: No Food Insecurity    Worried About Running Out of Food in the Last Year: Never true    [address] in the Last Year: Never true   Transportation Needs: No Transportation Needs    Lack of Transportation (Medical): No    Lack of Transportation (Non-Medical): No   Physical Activity: Not on file   Stress: Not on file   Social Connections: Not on file   Intimate Partner Violence: Not on file   Housing Stability: Low Risk     Unable to Pay for Housing in the Last Year: No    Number of Places Lived in the Last Year: 1    Unstable Housing in the Last Year: No       Interval Physical Examination:  Temp 97 6 °F (36 4 °C) (Temporal)   Ht 5' 7" (1 702 m)   Wt 79 4 kg (175 lb)   BMI 27 41 kg/m²   NAD      Procedure:      Assessment:  1   Laryngopharyngeal reflux (LPR)  famotidine (PEPCID) 40 MG tablet    omeprazole (PriLOSEC) 40 MG capsule       Plan:  1  LPR symptoms, specifically globus, mostly resolved on BID reflux therapy  I recommended stopping daily omeprazole but refilled it to be used as needed  She will continue nightly pepcid once or twice a day and follow up as needed

## 2022-03-31 NOTE — TELEPHONE ENCOUNTER
Patient sees Dr Omid Jones Daughters is calling in from [facility] wanting to get the MR script without contrast order faxed over to him at [phone]  Please fax          Call back if needed# [phone]

## 2022-04-01 ENCOUNTER — TELEPHONE (OUTPATIENT)
Dept: OBGYN CLINIC | Facility: HOSPITAL | Age: 52
End: 2022-04-01

## 2022-04-01 LAB
FAT STL QL: NORMAL
NEUTRAL FAT STL QL: NORMAL

## 2022-04-01 NOTE — TELEPHONE ENCOUNTER
Jose Has is calling to request that we fax the right knee MRI order to [facility] in [address]   Jose Has is scheduled for the MRI on Tuesday 04/05/2022    Please advise Jose Has [phone]

## 2022-04-02 LAB
FAT STL QL: NORMAL
MEAT FIBERS STL QL MICRO: NORMAL
NEUTRAL FAT STL QL: NORMAL

## 2022-04-07 ENCOUNTER — OFFICE VISIT (OUTPATIENT)
Dept: OBGYN CLINIC | Facility: CLINIC | Age: 52
End: 2022-04-07
Payer: COMMERCIAL

## 2022-04-07 VITALS
SYSTOLIC BLOOD PRESSURE: 120 MMHG | HEART RATE: 83 BPM | HEIGHT: 67 IN | DIASTOLIC BLOOD PRESSURE: 85 MMHG | WEIGHT: 175 LBS | BODY MASS INDEX: 27.47 KG/M2

## 2022-04-07 DIAGNOSIS — S82.831A CLOSED FRACTURE OF PROXIMAL END OF RIGHT FIBULA, UNSPECIFIED FRACTURE MORPHOLOGY, INITIAL ENCOUNTER: ICD-10-CM

## 2022-04-07 DIAGNOSIS — S83.241A ACUTE MEDIAL MENISCUS TEAR OF RIGHT KNEE, INITIAL ENCOUNTER: Primary | ICD-10-CM

## 2022-04-07 PROCEDURE — 99214 OFFICE O/P EST MOD 30 MIN: CPT | Performed by: ORTHOPAEDIC SURGERY

## 2022-04-12 ENCOUNTER — TELEPHONE (OUTPATIENT)
Dept: FAMILY MEDICINE CLINIC | Facility: CLINIC | Age: 52
End: 2022-04-12

## 2022-04-12 DIAGNOSIS — Z01.84 IMMUNITY STATUS TESTING: Primary | ICD-10-CM

## 2022-04-12 NOTE — TELEPHONE ENCOUNTER
Patient called and stated that she is getting a new job and needs to have MMR titers done      Can we please order this lab work for her

## 2022-04-13 ENCOUNTER — OFFICE VISIT (OUTPATIENT)
Dept: OBGYN CLINIC | Facility: CLINIC | Age: 52
End: 2022-04-13
Payer: COMMERCIAL

## 2022-04-13 VITALS
HEART RATE: 73 BPM | WEIGHT: 182 LBS | SYSTOLIC BLOOD PRESSURE: 134 MMHG | BODY MASS INDEX: 28.56 KG/M2 | HEIGHT: 67 IN | TEMPERATURE: 97.3 F | DIASTOLIC BLOOD PRESSURE: 84 MMHG

## 2022-04-13 DIAGNOSIS — S83.241A ACUTE MEDIAL MENISCUS TEAR OF RIGHT KNEE, INITIAL ENCOUNTER: ICD-10-CM

## 2022-04-13 DIAGNOSIS — M25.662 KNEE STIFFNESS, LEFT: ICD-10-CM

## 2022-04-13 DIAGNOSIS — S82.034A CLOSED NONDISPLACED TRANSVERSE FRACTURE OF RIGHT PATELLA, INITIAL ENCOUNTER: Primary | ICD-10-CM

## 2022-04-13 PROCEDURE — 99214 OFFICE O/P EST MOD 30 MIN: CPT | Performed by: ORTHOPAEDIC SURGERY

## 2022-04-13 NOTE — LETTER
April 13, 2022     Sangzane Zepeda DO  [address] [id number]    Patient: Desiree Joseph   YOB: 1970   Date of Visit: 4/13/2022     Dear Dr Marcelino Barth      Thank you for referring Eric Mendoza to me for evaluation  Below are the relevant portions of my assessment and plan of care  If you have questions, please do not hesitate to call me  I look forward to following Leslee along with you           Sincerely,        Sanjana Irby MD        CC: No Recipients    Progress Notes:
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
## 2022-04-26 ENCOUNTER — OFFICE VISIT (OUTPATIENT)
Dept: FAMILY MEDICINE CLINIC | Facility: CLINIC | Age: 52
End: 2022-04-26
Payer: COMMERCIAL

## 2022-04-26 VITALS
RESPIRATION RATE: 16 BRPM | HEART RATE: 88 BPM | BODY MASS INDEX: 27.94 KG/M2 | WEIGHT: 178 LBS | DIASTOLIC BLOOD PRESSURE: 70 MMHG | HEIGHT: 67 IN | TEMPERATURE: 100 F | SYSTOLIC BLOOD PRESSURE: 116 MMHG

## 2022-04-26 DIAGNOSIS — Z00.00 HEALTHCARE MAINTENANCE: ICD-10-CM

## 2022-04-26 DIAGNOSIS — Z12.31 BREAST CANCER SCREENING BY MAMMOGRAM: ICD-10-CM

## 2022-04-26 DIAGNOSIS — J01.00 ACUTE NON-RECURRENT MAXILLARY SINUSITIS: Primary | ICD-10-CM

## 2022-04-26 PROCEDURE — 1036F TOBACCO NON-USER: CPT | Performed by: FAMILY MEDICINE

## 2022-04-26 PROCEDURE — 99213 OFFICE O/P EST LOW 20 MIN: CPT | Performed by: FAMILY MEDICINE

## 2022-04-26 PROCEDURE — 3725F SCREEN DEPRESSION PERFORMED: CPT | Performed by: FAMILY MEDICINE

## 2022-04-26 RX ORDER — CEFDINIR 300 MG/1
600 CAPSULE ORAL DAILY
Qty: 20 CAPSULE | Refills: 0 | Status: SHIPPED | OUTPATIENT
Start: 2022-04-26 | End: 2022-05-06

## 2022-04-26 NOTE — PROGRESS NOTES
Assessment/Plan:    No problem-specific Assessment & Plan notes found for this encounter  Early sinusitis, suggest abx if no better since has chronic diarrhea    Anxiety/insomnia stable for now, court issues in future anticipated     Diagnoses and all orders for this visit:    Acute non-recurrent maxillary sinusitis  -     cefdinir (OMNICEF) 300 mg capsule; Take 2 capsules (600 mg total) by mouth daily for 10 days    Breast cancer screening by mammogram  -     Mammo screening bilateral w 3d & cad; Future    Healthcare maintenance  -     Ambulatory referral to Obstetrics / Gynecology; Future        Return if symptoms worsen or fail to improve  Subjective:      Patient ID: Neri Mahoney is a 46 y o  female  Chief Complaint   Patient presents with    Sore Throat     started a couple days ago  ac/lpn    Cough    Sinus pressure    Fever     started yesterday  HPI  Sore throat  Cough  About 5d  Yesterday fever 100 4  Feverish and chills  Throat clearing  Yellow nasal dc  Zyrtec at first   is sick    The following portions of the patient's history were reviewed and updated as appropriate: allergies, current medications, past family history, past medical history, past social history, past surgical history and problem list     Review of Systems   Respiratory: Negative for shortness of breath and wheezing            Current Outpatient Medications   Medication Sig Dispense Refill    acetaminophen (TYLENOL) 325 mg tablet Take 2 tablets (650 mg total) by mouth every 6 (six) hours as needed for mild pain  0    aspirin 81 mg chewable tablet Chew 1 tablet daily      Calcium Carbonate-Vit D-Min (CALCIUM 1200 PO) Take by mouth      estradiol (ESTRACE) 2 MG tablet Take 1 tablet by mouth daily      famotidine (PEPCID) 40 MG tablet Take 1 tablet (40 mg total) by mouth daily at bedtime 90 tablet 3    ALPRAZolam (XANAX) 0 25 mg tablet Take 1 tablet (0 25 mg total) by mouth every 6 (six) hours as needed for anxiety (avoid with opioids) (Patient not taking: Reported on 4/26/2022 ) 40 tablet 0    cefdinir (OMNICEF) 300 mg capsule Take 2 capsules (600 mg total) by mouth daily for 10 days 20 capsule 0     No current facility-administered medications for this visit  Objective:    /70   Pulse 88   Temp 100 °F (37 8 °C)   Resp 16   Ht 5' 7" (1 702 m)   Wt 80 7 kg (178 lb)   BMI 27 88 kg/m²        Physical Exam  Vitals and nursing note reviewed  Constitutional:       General: She is not in acute distress  Appearance: She is well-developed  She is not ill-appearing  HENT:      Head: Normocephalic  Right Ear: Tympanic membrane normal       Left Ear: Tympanic membrane normal       Nose: No congestion  Mouth/Throat:      Pharynx: No oropharyngeal exudate  Eyes:      General: No scleral icterus  Conjunctiva/sclera: Conjunctivae normal    Cardiovascular:      Rate and Rhythm: Normal rate and regular rhythm  Pulmonary:      Effort: Pulmonary effort is normal  No respiratory distress  Breath sounds: No wheezing  Abdominal:      Palpations: Abdomen is soft  Musculoskeletal:         General: No deformity  Cervical back: Neck supple  Skin:     General: Skin is warm and dry  Coloration: Skin is not pale  Neurological:      Mental Status: She is alert  Psychiatric:         Mood and Affect: Mood normal          Behavior: Behavior normal          Thought Content:  Thought content normal                 Tatiana Goodpasture, DO

## 2022-04-28 ENCOUNTER — OFFICE VISIT (OUTPATIENT)
Dept: GASTROENTEROLOGY | Facility: CLINIC | Age: 52
End: 2022-04-28
Payer: COMMERCIAL

## 2022-04-28 VITALS
BODY MASS INDEX: 27.94 KG/M2 | HEART RATE: 90 BPM | WEIGHT: 178 LBS | HEIGHT: 67 IN | DIASTOLIC BLOOD PRESSURE: 75 MMHG | SYSTOLIC BLOOD PRESSURE: 98 MMHG

## 2022-04-28 DIAGNOSIS — R19.7 DIARRHEA, UNSPECIFIED TYPE: Primary | ICD-10-CM

## 2022-04-28 DIAGNOSIS — F41.8 SITUATIONAL ANXIETY: ICD-10-CM

## 2022-04-28 DIAGNOSIS — Z90.49 HISTORY OF BOWEL RESECTION: ICD-10-CM

## 2022-04-28 PROCEDURE — 3008F BODY MASS INDEX DOCD: CPT | Performed by: FAMILY MEDICINE

## 2022-04-28 PROCEDURE — 99214 OFFICE O/P EST MOD 30 MIN: CPT | Performed by: INTERNAL MEDICINE

## 2022-04-28 RX ORDER — CHOLESTYRAMINE 4 G/9G
1 POWDER, FOR SUSPENSION ORAL
Qty: 30 PACKET | Refills: 0 | Status: SHIPPED | OUTPATIENT
Start: 2022-04-28

## 2022-04-28 NOTE — PROGRESS NOTES
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, she had 60 cm in (2 ft close) resected which should not cause malabsorption  There is possibility of vitamin-A, B, D and E malabsorption  I will try her on Questran to see for diarrhea disease with this  Her diarrhea may also be possibly from irritable bowel syndrome  She does have history of anxiety  If the Mellisa Khan is not effective then will do a colonoscopy to X your absence of microscopic colitis  Further recommendations will follow  - cholestyramine (QUESTRAN) 4 g packet; Take 1 packet (4 g total) by mouth 3 (three) times a day with meals  Dispense: 30 packet; Refill: 0    2  History of bowel resection  Patient had about2 ft of small bowel resected  The small bowel was proximal small bowel  3  Situational anxiety  The patient has history of anxiety disorder  Cannot exclude possibility of irritable bowel syndrome  Further recommendations will follow  She may need a colonoscopy  I have asked patient to give me a call in seven days after starting Questran     ______________________________________________________________________    SUBJECTIVE:  Continues to have diarrhea  Has not lost significant amount of weight however  Stool studies are negative  She denies any abdominal pain or discomfort  There is no nausea or vomiting  REVIEW OF SYSTEMS IS OTHERWISE NEGATIVE  Historical Information   History reviewed  No pertinent past medical history    Past Surgical History:   Procedure Laterality Date    APPENDECTOMY Last assessed 2016     HYSTERECTOMY      HYSTEROSCOPY W/ ENDOMETRIAL ABLATION      Last assessed 12/15/2014     LAPAROTOMY N/A 2022    Procedure: LAPAROTOMY EXPLORATORY, EXTENSIVE SMALL BOWEL RESECTION;  Surgeon: Hellen Narvaez MD;  Location: [facility];  Service: General    OOPHORECTOMY      TUBAL LIGATION      Last assessed 12/15/2014      Social History   Social History     Substance and Sexual Activity   Alcohol Use Yes     Social History     Substance and Sexual Activity   Drug Use No     Social History     Tobacco Use   Smoking Status Former Smoker    Quit date:     Years since quittin 3   Smokeless Tobacco Never Used     Family History   Problem Relation Age of Onset    Breast cancer Mother 58    Motor neuron disease Mother     Breast cancer Paternal Grandmother 76    Cancer Father         Neck cancer    Salima's disease Family     Colon cancer Maternal Aunt     Breast cancer Maternal Aunt 80    Basal cell carcinoma Sister     Basal cell carcinoma Brother        Meds/Allergies       Current Outpatient Medications:     acetaminophen (TYLENOL) 325 mg tablet    aspirin 81 mg chewable tablet    Calcium Carbonate-Vit D-Min (CALCIUM 1200 PO)    cefdinir (OMNICEF) 300 mg capsule    estradiol (ESTRACE) 2 MG tablet    famotidine (PEPCID) 40 MG tablet    ALPRAZolam (XANAX) 0 25 mg tablet    cholestyramine (QUESTRAN) 4 g packet    No Known Allergies        Objective     Blood pressure 98/75, pulse 90, height 5' 7" (1 702 m), weight 80 7 kg (178 lb)  Body mass index is 27 88 kg/m²  PHYSICAL EXAM:      General Appearance:   Alert, cooperative, no distress   HEENT:   Normocephalic, atraumatic, anicteric      Neck:  Supple, symmetrical, trachea midline   Lungs:   Clear to auscultation bilaterally; no rales, rhonchi or wheezing; respirations unlabored    Heart[de-identified]   Regular rate and rhythm; no murmur, rub, or gallop     Abdomen:   Soft, non-tender, non-distended; normal bowel sounds; no masses, no organomegaly    Genitalia:   Deferred    Rectal:   Deferred    Extremities:  No cyanosis, clubbing or edema    Pulses:  2+ and symmetric    Skin:  No jaundice, rashes, or lesions    Lymph nodes:  No palpable cervical lymphadenopathy        Lab Results:   No visits with results within 1 Day(s) from this visit     Latest known visit with results is:   Appointment on 03/30/2022   Component Date Value    Fat Qual Neutral, Stl 03/30/2022 Normal     Muscle Fiber, Stool 03/30/2022 Normal     Fat,Totall 03/30/2022 Normal     C difficile toxin by PCR 03/30/2022 Negative          Radiology Results:   MRI knee right w wo contrast    Result Date: 4/7/2022  Narrative: 1 2 410 [id number]

## 2022-05-02 LAB
MEV IGG SER IA-ACNC: >300 AU/ML
MUV IGG SER IA-ACNC: 57.9 AU/ML
RUBV IGG SERPL IA-ACNC: 20.2 INDEX

## 2022-05-12 ENCOUNTER — TELEPHONE (OUTPATIENT)
Dept: OBGYN CLINIC | Facility: HOSPITAL | Age: 52
End: 2022-05-12

## 2022-05-18 ENCOUNTER — TELEPHONE (OUTPATIENT)
Dept: OBGYN CLINIC | Facility: HOSPITAL | Age: 52
End: 2022-05-18

## 2022-05-18 NOTE — TELEPHONE ENCOUNTER
Patient sees Dr Alex Sadler from [facility] and Ashley Moncada is calling to check on the status of a physician statement form that was faxed over in April  I advised I do not see it in the chart  She is refaxing

## 2022-05-23 ENCOUNTER — OFFICE VISIT (OUTPATIENT)
Dept: OBGYN CLINIC | Facility: CLINIC | Age: 52
End: 2022-05-23
Payer: COMMERCIAL

## 2022-05-23 VITALS
WEIGHT: 203 LBS | TEMPERATURE: 98.2 F | SYSTOLIC BLOOD PRESSURE: 128 MMHG | BODY MASS INDEX: 31.86 KG/M2 | DIASTOLIC BLOOD PRESSURE: 85 MMHG | HEIGHT: 67 IN | HEART RATE: 66 BPM | RESPIRATION RATE: 19 BRPM

## 2022-05-23 DIAGNOSIS — S82.034A CLOSED NONDISPLACED TRANSVERSE FRACTURE OF RIGHT PATELLA, INITIAL ENCOUNTER: Primary | ICD-10-CM

## 2022-05-23 PROCEDURE — 3008F BODY MASS INDEX DOCD: CPT | Performed by: PHYSICIAN ASSISTANT

## 2022-05-23 PROCEDURE — 1036F TOBACCO NON-USER: CPT | Performed by: PHYSICIAN ASSISTANT

## 2022-05-23 PROCEDURE — 99213 OFFICE O/P EST LOW 20 MIN: CPT | Performed by: PHYSICIAN ASSISTANT

## 2022-05-23 NOTE — PROGRESS NOTES
Assessment/Plan:  1  Closed nondisplaced transverse fracture of right patella, initial encounter       Patient still has weakness and limitations in range of motion about the left knee  I do feel she would benefit from formal physical therapy for this  Again, she does have this scheduled to start on Wednesday  If making progress and doing well in 6 weeks, she does not necessarily have to follow-up  If she fails to make progress she will follow-up at 6 weeks for repeat evaluation  She may gradually increase her activity as tolerated  Subjective:   Elian Diallo is a 46 y o  female who presents today for follow-up of her right knee  We have been treating her conservatively for nondisplaced transverse patella fracture  We had prescribed physical therapy last visit, however she did go away for a period of time and thus has not started this yet  She does have an appointment to start this on Wednesday however they needed a new office note and physical therapy prescription, so she does present today for repeat evaluation  She notes ongoing pain about the anterior knee, which is worse with activity and better with rest   She also complains of some weakness as well as limitations in extension of the knee  She notes good sensation of the right lower extremity and denies any significant swelling  Review of Systems   Constitutional: Negative  Negative for chills and fever  HENT: Negative  Negative for ear pain and sore throat  Eyes: Negative  Negative for pain and redness  Respiratory: Negative  Negative for shortness of breath and wheezing  Cardiovascular: Negative for chest pain and palpitations  Gastrointestinal: Negative  Negative for abdominal pain and blood in stool  Endocrine: Negative  Negative for polydipsia and polyuria  Genitourinary: Negative  Negative for difficulty urinating and dysuria  Musculoskeletal:        As noted in HPI   Skin: Negative    Negative for pallor and rash    Neurological: Negative  Negative for dizziness and numbness  Hematological: Negative  Negative for adenopathy  Does not bruise/bleed easily  Psychiatric/Behavioral: Negative  Negative for confusion and suicidal ideas  History reviewed  No pertinent past medical history  Past Surgical History:   Procedure Laterality Date    APPENDECTOMY      Last assessed 2016     HYSTERECTOMY      HYSTEROSCOPY W/ ENDOMETRIAL ABLATION      Last assessed 12/15/2014     LAPAROTOMY N/A 2022    Procedure: LAPAROTOMY EXPLORATORY, EXTENSIVE SMALL BOWEL RESECTION;  Surgeon: Gonzalez Pro MD;  Location[facility];  Service: General    OOPHORECTOMY      TUBAL LIGATION      Last assessed 12/15/2014        Family History   Problem Relation Age of Onset   Ángel Bones Breast cancer Mother 58    Motor neuron disease Mother     Breast cancer Paternal Grandmother 76    Cancer Father         Neck cancer    Dixon's disease Family     Colon cancer Maternal Aunt     Breast cancer Maternal Aunt 80    Basal cell carcinoma Sister     Basal cell carcinoma Brother        Social History     Occupational History    Not on file   Tobacco Use    Smoking status: Former Smoker     Quit date:      Years since quittin 4    Smokeless tobacco: Never Used   Vaping Use    Vaping Use: Never used   Substance and Sexual Activity    Alcohol use:  Yes    Drug use: No    Sexual activity: Not on file         Current Outpatient Medications:     acetaminophen (TYLENOL) 325 mg tablet, Take 2 tablets (650 mg total) by mouth every 6 (six) hours as needed for mild pain, Disp: , Rfl: 0    ALPRAZolam (XANAX) 0 25 mg tablet, Take 1 tablet (0 25 mg total) by mouth every 6 (six) hours as needed for anxiety (avoid with opioids), Disp: 40 tablet, Rfl: 0    aspirin 81 mg chewable tablet, Chew 1 tablet daily, Disp: , Rfl:     Calcium Carbonate-Vit D-Min (CALCIUM 1200 PO), Take by mouth, Disp: , Rfl:     cholestyramine (QUESTRAN) 4 g packet, Take 1 packet (4 g total) by mouth 3 (three) times a day with meals, Disp: 30 packet, Rfl: 0    estradiol (ESTRACE) 2 MG tablet, Take 1 tablet by mouth daily, Disp: , Rfl:     famotidine (PEPCID) 40 MG tablet, Take 1 tablet (40 mg total) by mouth daily at bedtime, Disp: 90 tablet, Rfl: 3    No Known Allergies    Objective:  Vitals:    05/23/22 0808   BP: 128/85   Pulse: 66   Resp: 19   Temp: 98 2 °F (36 8 °C)       Right Knee Exam     Tenderness   The patient is experiencing tenderness in the patella and medial retinaculum  Range of Motion   Extension: -5   Flexion: 120     Tests   Varus: negative Valgus: negative  Lachman:  Anterior - negative      Drawer:  Anterior - negative    Posterior - negative    Other   Erythema: absent  Sensation: normal  Pulse: present  Swelling: none  Effusion: no effusion present          Observations     Right Knee   Negative for effusion  Physical Exam  Constitutional:       General: She is not in acute distress  Appearance: She is well-developed  HENT:      Head: Normocephalic and atraumatic  Eyes:      General: No scleral icterus  Conjunctiva/sclera: Conjunctivae normal    Neck:      Vascular: No JVD  Cardiovascular:      Rate and Rhythm: Normal rate  Pulmonary:      Effort: Pulmonary effort is normal  No respiratory distress  Musculoskeletal:      Right knee: No effusion  Skin:     General: Skin is warm  Neurological:      Mental Status: She is alert and oriented to person, place, and time        Coordination: Coordination normal

## 2022-05-25 ENCOUNTER — EVALUATION (OUTPATIENT)
Dept: PHYSICAL THERAPY | Facility: CLINIC | Age: 52
End: 2022-05-25
Payer: COMMERCIAL

## 2022-05-25 DIAGNOSIS — S83.241D ACUTE MEDIAL MENISCUS TEAR OF RIGHT KNEE, SUBSEQUENT ENCOUNTER: ICD-10-CM

## 2022-05-25 DIAGNOSIS — S82.034D CLOSED NONDISPLACED TRANSVERSE FRACTURE OF RIGHT PATELLA WITH ROUTINE HEALING, SUBSEQUENT ENCOUNTER: Primary | ICD-10-CM

## 2022-05-25 PROCEDURE — 97161 PT EVAL LOW COMPLEX 20 MIN: CPT
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
## 2022-06-06 ENCOUNTER — OFFICE VISIT (OUTPATIENT)
Dept: PHYSICAL THERAPY | Facility: CLINIC | Age: 52
End: 2022-06-06
Payer: COMMERCIAL

## 2022-06-06 DIAGNOSIS — S83.241D ACUTE MEDIAL MENISCUS TEAR OF RIGHT KNEE, SUBSEQUENT ENCOUNTER: ICD-10-CM

## 2022-06-06 DIAGNOSIS — S82.034D CLOSED NONDISPLACED TRANSVERSE FRACTURE OF RIGHT PATELLA WITH ROUTINE HEALING, SUBSEQUENT ENCOUNTER: Primary | ICD-10-CM

## 2022-06-06 PROCEDURE — 97112 NEUROMUSCULAR REEDUCATION: CPT

## 2022-06-06 PROCEDURE — 97110 THERAPEUTIC EXERCISES: CPT

## 2022-06-06 NOTE — PROGRESS NOTES
Daily Note      Today's date: 2022  Patient name: Roberto Hammer  : 1970   MRN: [id number]  Referring provider: Payal Meredith*  Dx:   Encounter Diagnosis     ICD-10-CM    1  Closed nondisplaced transverse fracture of right patella with routine healing, subsequent encounter  S82 034D    2  Acute medial meniscus tear of right knee, subsequent encounter  S83 241D        Subjective: Pt reports that her right knee is "a little sore," which she attributes to doing yard work  Pt states that she was not sure if she should still do her exercises on days that she feels sore  Objective: See treatment diary below    Assessment: Pt tolerated treatment well  Pt introduced to SAQ and presented with fatigue and muscle trembling of the R quad  Pt demonstrates good carryover of initial HEP  Pt also introduced to SLR and demonstrates the ability to perform without assistance, but fatigues quickly  Pt was educated to include SLR in HEP  Plan: Continue per plan of care  Progress treatment as tolerated  Insurance:  AMA/CMS Eval/ Re-eval POC expires FOTO Auth Status Total   Units  Start date  Expiration date Extension  Visit limitation? PT only or  PT+OT?  Co-Insurance   CMS - auto 22  Approved       No                                                               AUTH Status:  Date              Ther Ex 51713: 24 units Used  1              Remaining  24 23             Neuro Re-Ed 92711: 24 units Used  1              Remaining 24 23             Manual Therapy 82420: 12 units Used                Remaining 12              Ther Act 72565: 12 units Used                Remaining 12                  Precautions: Hx of laparotomy 2022, hx of R patella fracture and medial meniscus tear     EVAL 2 3 4 5 6   Manuals 22       STM/MFR         Manual flexibility HS, gastrocs, quads  R HS        Joint mobs knee         Neuro Re-Ed         [employer] Instructed w/ towel roll under knee 2x10 (5s)        SLR  2x5       Clamshells  20x GTB       Bridging  1x8        SLS         SAQ  2x10 (3s)                                   Ther Ex         Nustep  5' L1       PROM R knee  Flex/ext       Heel prop Instructed x 2' 3'        Heel slides Instructed 2x10 (5s)        Gastroc stretch         HS stretch                                                      Ther Activity         Pt education POC, HEP Reviewed HEP       Stairs         Squats         Gait                                    Modalities                             HEP 1-2x daily, 7x/week  Quad sets 2x10 (5s)  Heel prop x 2 min  Heel slides 2x10 (5s)   SLR 2x5

## 2022-06-08 ENCOUNTER — OFFICE VISIT (OUTPATIENT)
Dept: PHYSICAL THERAPY | Facility: CLINIC | Age: 52
End: 2022-06-08
Payer: COMMERCIAL

## 2022-06-08 DIAGNOSIS — S82.034D CLOSED NONDISPLACED TRANSVERSE FRACTURE OF RIGHT PATELLA WITH ROUTINE HEALING, SUBSEQUENT ENCOUNTER: Primary | ICD-10-CM

## 2022-06-08 DIAGNOSIS — S83.241D ACUTE MEDIAL MENISCUS TEAR OF RIGHT KNEE, SUBSEQUENT ENCOUNTER: ICD-10-CM

## 2022-06-08 PROCEDURE — 97140 MANUAL THERAPY 1/> REGIONS: CPT

## 2022-06-08 PROCEDURE — 97110 THERAPEUTIC EXERCISES: CPT

## 2022-06-08 PROCEDURE — 97112 NEUROMUSCULAR REEDUCATION: CPT

## 2022-06-08 NOTE — PROGRESS NOTES
Daily Note      Today's date: 2022  Patient name: Alexandru Mckeon  : 1970  MRN: [id number]  Referring provider: Stephanie Lin  Dx:   Encounter Diagnosis     ICD-10-CM    1  Closed nondisplaced transverse fracture of right patella with routine healing, subsequent encounter  S82 034D    2  Acute medial meniscus tear of right knee, subsequent encounter  S83 241D        Subjective: Pt reports that her right knee feels as if it is swollen, but does not appear swollen  Objective: See treatment diary below    Assessment: Pt tolerated treatment well  Pt presented with increased tissue tension in the distal right quad, which decreased slightly following manual treatment  Pt demonstrates improved active and passive R knee extension ROM, but is still limited and painful with knee flexion  Pt demonstrates improved R quad activation and was able to increase repetitions of SLR, but fatigues quickly  Plan: Continue per plan of care  Progress treatment as tolerated  Insurance:  AMA/CMS Eval/ Re-eval POC expires FOTO Auth Status Total   Units  Start date  Expiration date Extension  Visit limitation? PT only or  PT+OT?  Co-Insurance   CMS - auto 22  Approved       No                                                               AUTH Status:  Date             Ther Ex 94359: 24 units Used -- 1 1             Remaining              Neuro Re-Ed 45134: 24 units Used -- 1 1             Remaining             Manual Therapy 30614: 12 units Used -- -- 1             Remaining 12 12 11            Ther Act 58647: 12 units Used -- -- --             Remaining 12 12 12                Precautions: Hx of laparotomy 2022, hx of R patella fracture and medial meniscus tear     EVAL 2 3 4 5 6   Manuals 22      STM/MFR   Distal R quad      Manual flexibility HS, gastrocs, quads  R HS  R HS      Joint mobs knee         Neuro Re-Ed         [employer] Instructed w/ towel roll under knee 2x10 (5s)  2x10 (5s)       SLR  2x5 2x8       Clamshells  20x GTB       Bridging  1x8        SLS         SAQ  2x10 (3s)                                   Ther Ex         Nustep  5' L1 8' L1      PROM R knee  Flex/ext Flex/ext      Heel prop Instructed x 2' 3'        Heel slides Instructed 2x10 (5s)  15x w/ SOS and peanut      Gastroc stretch         HS stretch                                                      Ther Activity         Pt education POC, HEP Reviewed HEP FMLA paperwork and reviewed HEP      Stairs         Squats         Gait                                    Modalities                             HEP 1-2x daily, 7x/week  Quad sets 2x10 (5s)  Heel prop x 2 min  Heel slides 2x10 (5s)   SLR 2x5

## 2022-06-09 ENCOUNTER — HOSPITAL ENCOUNTER (OUTPATIENT)
Dept: RADIOLOGY | Facility: HOSPITAL | Age: 52
Discharge: HOME | End: 2022-06-09
Attending: FAMILY MEDICINE
Payer: COMMERCIAL

## 2022-06-09 VITALS — BODY MASS INDEX: 28.25 KG/M2 | WEIGHT: 180 LBS | HEIGHT: 67 IN

## 2022-06-09 DIAGNOSIS — Z12.31 BREAST CANCER SCREENING BY MAMMOGRAM: ICD-10-CM

## 2022-06-09 PROCEDURE — 77067 SCR MAMMO BI INCL CAD: CPT

## 2022-06-09 PROCEDURE — 77063 BREAST TOMOSYNTHESIS BI: CPT

## 2022-06-10 ENCOUNTER — TELEPHONE (OUTPATIENT)
Dept: FAMILY MEDICINE CLINIC | Facility: CLINIC | Age: 52
End: 2022-06-10

## 2022-06-10 NOTE — TELEPHONE ENCOUNTER
Patient is asking for a call  She states she has some questions that she would like to talk to you about    Ashley Марина, MA
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
## 2022-06-13 ENCOUNTER — OFFICE VISIT (OUTPATIENT)
Dept: PHYSICAL THERAPY | Facility: CLINIC | Age: 52
End: 2022-06-13
Payer: COMMERCIAL

## 2022-06-13 DIAGNOSIS — S83.241D ACUTE MEDIAL MENISCUS TEAR OF RIGHT KNEE, SUBSEQUENT ENCOUNTER: ICD-10-CM

## 2022-06-13 DIAGNOSIS — S82.034D CLOSED NONDISPLACED TRANSVERSE FRACTURE OF RIGHT PATELLA WITH ROUTINE HEALING, SUBSEQUENT ENCOUNTER: Primary | ICD-10-CM

## 2022-06-13 PROCEDURE — 97112 NEUROMUSCULAR REEDUCATION: CPT

## 2022-06-13 PROCEDURE — 97110 THERAPEUTIC EXERCISES: CPT

## 2022-06-13 PROCEDURE — 97140 MANUAL THERAPY 1/> REGIONS: CPT

## 2022-06-13 NOTE — PROGRESS NOTES
Daily Note      Today's date: 2022  Patient name: Nav Cheek  : 1970  MRN: [id number]  Referring provider: Primo Bahena*  Dx:   Encounter Diagnosis     ICD-10-CM    1  Closed nondisplaced transverse fracture of right patella with routine healing, subsequent encounter  S82 034D    2  Acute medial meniscus tear of right knee, subsequent encounter  S83 241D        Subjective: Pt reports that her right knee felt sore yesterday after doing some yard work/gardening  Pt states that she took it easy yesterday and applied ice  Pt states that she has still been applying ice today  Objective: See treatment diary below    Assessment: Pt tolerated treatment well  Pt demonstrates improvement in R knee extension A/PROM  Pt continues to note end range pain in the right knee with flexion AA/PROM  Pt introduced to gastrocs and hamstrings self stretching and tolerated well  Pt demonstrates improved R quad activation  Plan: Continue per plan of care  Progress treatment as tolerated  Insurance:  AMA/CMS Eval/ Re-eval POC expires FOTO Auth Status Total   Units  Start date  Expiration date Extension  Visit limitation? PT only or  PT+OT?  Co-Insurance   CMS - auto 22  Approved       No                                                               AUTH Status:  Date            Ther Ex 94764: 24 units Used -- 1 1 1            Remaining             Neuro Re-Ed 13454: 24 units Used -- 1 1 1            Remaining            Manual Therapy 64028: 12 units Used -- -- 1 1            Remaining 12 12 11 10           Ther Act 30750: 12 units Used -- -- -- --            Remaining 12 12 12 12               Precautions: Hx of laparotomy 2022, hx of R patella fracture and medial meniscus tear     EVAL 2 3 4 5 6   Manuals 22     STM/MFR   Distal R quad      Manual flexibility HS, gastrocs, quads  R HS  R HS R HS     Joint mobs knee    Patellar mobs gr II-III in all planes     Neuro Re-Ed         Quad sets Instructed w/ towel roll under knee 2x10 (5s)  2x10 (5s)  2x10 (5s)      SLR  2x5 2x8  2x8      Clamshells  20x GTB  20x GTB     Bridging  1x8   2x8      SLS         SAQ  2x10 (3s)   15x3s                                 Ther Ex         Nustep  5' L1 8' L1 10' L2     PROM R knee  Flex/ext Flex/ext Flex/ext      Heel prop Instructed x 2' 3'        Heel slides Instructed 2x10 (5s)  15x w/ SOS and peanut 15x w/ SOS and peanut     Gastroc stretch    5x10s w/ SOS     HS stretch    3x10s w/ SOS                                                  Ther Activity         Pt education POC, HEP Reviewed HEP FMLA paperwork and reviewed HEP      Stairs         Squats         Gait                                    Modalities                             HEP 1-2x daily, 7x/week  Quad sets 2x10 (5s)  Heel prop x 2 min  Heel slides 2x10 (5s)   SLR 2x5

## 2022-06-15 ENCOUNTER — OFFICE VISIT (OUTPATIENT)
Dept: PHYSICAL THERAPY | Facility: CLINIC | Age: 52
End: 2022-06-15
Payer: COMMERCIAL

## 2022-06-15 DIAGNOSIS — S83.241D ACUTE MEDIAL MENISCUS TEAR OF RIGHT KNEE, SUBSEQUENT ENCOUNTER: ICD-10-CM

## 2022-06-15 DIAGNOSIS — S82.034D CLOSED NONDISPLACED TRANSVERSE FRACTURE OF RIGHT PATELLA WITH ROUTINE HEALING, SUBSEQUENT ENCOUNTER: Primary | ICD-10-CM

## 2022-06-15 PROCEDURE — 97112 NEUROMUSCULAR REEDUCATION: CPT

## 2022-06-15 PROCEDURE — 97140 MANUAL THERAPY 1/> REGIONS: CPT

## 2022-06-15 PROCEDURE — 97110 THERAPEUTIC EXERCISES: CPT

## 2022-06-15 NOTE — PROGRESS NOTES
Daily Note      Today's date: 6/15/2022  Patient name: Madelyn Cifuentes  : 1970  MRN: [id number]  Referring provider: Suman Fuentes*  Dx:   Encounter Diagnosis     ICD-10-CM    1  Closed nondisplaced transverse fracture of right patella with routine healing, subsequent encounter  S82 034D    2  Acute medial meniscus tear of right knee, subsequent encounter  S83 241D        Subjective: Pt reports soreness in the right knee at the start of the visit  Objective: See treatment diary below    Assessment: Pt tolerated treatment well  Pt demonstrates full right knee extension A/PROM and decreased discomfort with this ROM  Pt continues to note end range pain with R knee flexion  Pt was provided with updated HEP with emphasis on quad strengthening and knee flexion ROM  Pt demonstrates good potential to progress toward light closed chain strengthening next visit  Plan: Continue per plan of care  Progress treatment as tolerated  Insurance:  AMA/CMS Eval/ Re-eval POC expires FOTO Auth Status Total   Units  Start date  Expiration date Extension  Visit limitation? PT only or  PT+OT?  Co-Insurance   CMS - auto 22  Approved       No                                                               AUTH Status:  Date 5/25 6/6 6/8 6/13 6/15          Ther Ex 49782: 24 units Used -- 1 1 1 1           Remaining   23 22 21 20          Neuro Re-Ed 11033: 24 units Used -- 1 1 1 1           Remaining  22 21 20          Manual Therapy 18372: 12 units Used -- -- 1 1 1           Remaining 12 12 11 10 9          Ther Act 59247: 12 units Used -- -- -- -- --           Remaining 12 12 12 12 12              Precautions: Hx of laparotomy 2022, hx of R patella fracture and medial meniscus tear     EVAL 2 3 4 5 6   Manuals 5/25/22 6/6/22 6/8/22 6/13/22 6/15/22    STM/MFR   Distal R quad      Manual flexibility HS, gastrocs, quads  R HS  R HS R HS R HS     Joint mobs knee    Patellar mobs gr II-III in all planes Patellar mobs gr II-III in all planes    Neuro Re-Ed         Quad sets Instructed w/ towel roll under knee 2x10 (5s)  2x10 (5s)  2x10 (5s)  2x10 (5s)     SLR  2x5 2x8  2x8  2x10    Clamshells  20x GTB  20x GTB 20x GTB    Bridging  1x8   2x8  2x10 w/ GTB hip abd    SLS         SAQ  2x10 (3s)   15x3s  2x10 (3s)     LAQ      2x10 (3s)     Mini squat          Step up                                    Ther Ex         Nustep  5' L1 8' L1 10' L2 Upright bike 8' L1    PROM R knee  Flex/ext Flex/ext Flex/ext  Flex/ext    Heel prop Instructed x 2' 3'        Heel slides Instructed 2x10 (5s)  15x w/ SOS and peanut 15x w/ SOS and peanut 15x w /SOS and peanut     Gastroc stretch    5x10s w/ SOS 5x10s w/ SOS    HS stretch    3x10s w/ SOS 5x10s w/ SOS                                                 Ther Activity         Pt education POC, HEP Reviewed HEP FMLA paperwork and reviewed HEP  Updated HEP    Stairs         Squats         Gait                                    Modalities                             Access Code: [email or web address]  URL: [email or web address]  Date: 06/15/2022  Prepared by: Abelardo Dilling    Exercises  · Supine Quad Set - 1-2 x daily - 7 x weekly - 2 sets - 10 reps - 5 hold  · Active Straight Leg Raise with Quad Set - 1-2 x daily - 7 x weekly - 2 sets - 10 reps - 1 hold  · Supine Knee Extension Strengthening - 1-2 x daily - 7 x weekly - 2 sets - 10 reps - 3 hold  · Seated Long Arc Quad - 1-2 x daily - 7 x weekly - 2 sets - 10 reps - 3 hold  · Supine Heel Slide with Strap - 1-2 x daily - 7 x weekly - 2 sets - 10 reps - 5 hold

## 2022-06-20 ENCOUNTER — OFFICE VISIT (OUTPATIENT)
Dept: PHYSICAL THERAPY | Facility: CLINIC | Age: 52
End: 2022-06-20
Payer: COMMERCIAL

## 2022-06-20 DIAGNOSIS — S82.034D CLOSED NONDISPLACED TRANSVERSE FRACTURE OF RIGHT PATELLA WITH ROUTINE HEALING, SUBSEQUENT ENCOUNTER: Primary | ICD-10-CM

## 2022-06-20 DIAGNOSIS — S83.241D ACUTE MEDIAL MENISCUS TEAR OF RIGHT KNEE, SUBSEQUENT ENCOUNTER: ICD-10-CM

## 2022-06-20 PROCEDURE — 97112 NEUROMUSCULAR REEDUCATION: CPT

## 2022-06-20 PROCEDURE — 97110 THERAPEUTIC EXERCISES: CPT

## 2022-06-20 NOTE — PROGRESS NOTES
Daily Note      Today's date: 2022  Patient name: Alexandru Mckeon  : 1970  MRN: [id number]  Referring provider: Stephanie Lin  Dx:   Encounter Diagnosis     ICD-10-CM    1  Closed nondisplaced transverse fracture of right patella with routine healing, subsequent encounter  S82 034D    2  Acute medial meniscus tear of right knee, subsequent encounter  S83 241D        Subjective: Pt reports right knee pain of 3/10 today  Pt states that she went to the beach over the weekend and walked [place]sand  Pt states that she had increased swelling above the right knee, which has improved with self-massage  Objective: See treatment diary below    Assessment: Pt tolerated treatment well  Pt presented with moderate tissue tension in the R distal quad, with minimal swelling throughout the right knee  Pt progressed to prostretch in standing and continues to note tightness in the posterior right knee  Pt also introduced to mini squats and step ups on 4" step  Pt noted global increase in right knee pain to 3/10 during mini squats to ~35-45° and minimal pain increase with step up  Pt noted weakness with step up and noted decreased control of the right knee towards the end of the exercise  Reviewed HEP with pt and she verbalized good understanding  Reviewed activity modification and symptom management including the RICE method  Plan: Continue per plan of care  Progress treatment as tolerated  Insurance:  AMA/CMS Eval/ Re-eval POC expires FOTO Auth Status Total   Units  Start date  Expiration date Extension  Visit limitation? PT only or  PT+OT?  Co-Insurance   CMS - auto 22  Approved       No                                                               AUTH Status:  Date 5/25 6/6 6/8 6/13 6/15 6/20         Ther Ex 68984: 24 units Used -- 1 1 1 1 1          Remaining   23 22 21 20 19         Neuro Re-Ed 33846: 24 units Used -- 1 1 1 1 2          Remaining 24 23 22 21 20 18         Manual Therapy 70162: 12 units Used -- -- 1 1 1 --          Remaining 12 12 11 10 9 9         Ther Act 69464: 12 units Used -- -- -- -- -- --          Remaining 12 12 12 12 12 12             Precautions: Hx of laparotomy 2/2022, hx of R patella fracture and medial meniscus tear     EVAL 2 3 4 5 6   Manuals 5/25/22 6/6/22 6/8/22 6/13/22 6/15/22 6/20/22   STM/MFR   Distal R quad      Manual flexibility HS, gastrocs, quads  R HS  R HS R HS R HS  R HS   Joint mobs knee    Patellar mobs gr II-III in all planes Patellar mobs gr II-III in all planes    Neuro Re-Ed         Quad sets Instructed w/ towel roll under knee 2x10 (5s)  2x10 (5s)  2x10 (5s)  2x10 (5s)  2x10 (5s)    SLR  2x5 2x8  2x8  2x10 2x10    Clamshells  20x GTB  20x GTB 20x GTB 20x medium loop   Bridging  1x8   2x8  2x10 w/ GTB hip abd 2x10 w/ medium loop hip abd   SLS         SAQ  2x10 (3s)   15x3s  2x10 (3s)  2x10 (3s)   LAQ      2x10 (3s)  2x10 (3s)    Mini squat       10x holding railings   Step up      10x 4" step                              Ther Ex         Nustep  5' L1 8' L1 10' L2 Upright bike 8' L1 Upright bike 10' L2   PROM R knee  Flex/ext Flex/ext Flex/ext  Flex/ext Flex/ext   Heel prop Instructed x 2' 3'        Heel slides Instructed 2x10 (5s)  15x w/ SOS and peanut 15x w/ SOS and peanut 15x w/ SOS and peanut     Gastroc stretch    5x10s w/ SOS 5x10s w/ SOS Prostretch 5x10s    HS stretch    3x10s w/ SOS 5x10s w/ SOS                                                 Ther Activity         Pt education POC, HEP Reviewed HEP FMLA paperwork and reviewed HEP  Updated HEP Reviewed HEP   Stairs         Squats         Gait                                    Modalities                             Access Code: [email or web address]  URL: [email or web address]  Date: 06/15/2022  Prepared by: Carrie Alpers    Exercises  · Supine Quad Set - 1-2 x daily - 7 x weekly - 2 sets - 10 reps - 5 hold  · Active Straight Leg Raise with Quad Set - 1-2 x daily - 7 x weekly - 2 sets - 10 reps - 1 hold  · Supine Knee Extension Strengthening - 1-2 x daily - 7 x weekly - 2 sets - 10 reps - 3 hold  · Seated Long Arc Quad - 1-2 x daily - 7 x weekly - 2 sets - 10 reps - 3 hold  · Supine Heel Slide with Strap - 1-2 x daily - 7 x weekly - 2 sets - 10 reps - 5 hold

## 2022-06-22 ENCOUNTER — OFFICE VISIT (OUTPATIENT)
Dept: PHYSICAL THERAPY | Facility: CLINIC | Age: 52
End: 2022-06-22
Payer: COMMERCIAL

## 2022-06-22 DIAGNOSIS — S83.241D ACUTE MEDIAL MENISCUS TEAR OF RIGHT KNEE, SUBSEQUENT ENCOUNTER: ICD-10-CM

## 2022-06-22 DIAGNOSIS — S82.034D CLOSED NONDISPLACED TRANSVERSE FRACTURE OF RIGHT PATELLA WITH ROUTINE HEALING, SUBSEQUENT ENCOUNTER: Primary | ICD-10-CM

## 2022-06-22 PROCEDURE — 97112 NEUROMUSCULAR REEDUCATION: CPT

## 2022-06-22 PROCEDURE — 97110 THERAPEUTIC EXERCISES: CPT

## 2022-06-22 NOTE — PROGRESS NOTES
Daily Note      Today's date: 2022  Patient name: Stacey Tom  : 1970  MRN: [id number]  Referring provider: Charan Stewart*  Dx:   Encounter Diagnosis     ICD-10-CM    1  Closed nondisplaced transverse fracture of right patella with routine healing, subsequent encounter  S82 034D    2  Acute medial meniscus tear of right knee, subsequent encounter  S83 241D        Subjective: Pt reports increased right knee soreness since last visit, which she attributes to exercise during this session  Pt states that the knee has been "clicking" a little bit more  Objective: See treatment diary below    Assessment: Pt tolerated treatment well  Pt continues to note no pain with right knee extension PROM and end range pain with right knee flexion PROM  Pt held from step ups and mini squats due to increased symptoms from last session  Plan: Continue per plan of care  Progress treatment as tolerated  Insurance:  AMA/CMS Eval/ Re-eval POC expires FOTO Auth Status Total   Units  Start date  Expiration date Extension  Visit limitation? PT only or  PT+OT?  Co-Insurance   CMS - auto 22 47/64 Approved       No      54/64                                                         AUTH Status:  Date 5/25 6/6 6/8 6/13 6/15 6/20 6/22        Ther Ex 81921: 24 units Used -- 1 1 1 1 1 1         Remaining  24  22 21 20 19 18        Neuro Re-Ed 31005: 24 units Used -- 1 1 1 1 2 2         Remaining 24  22 21 20 18 16        Manual Therapy 40381: 12 units Used -- -- 1 1 1 -- --         Remaining 12 12 11 10 9 9 9        Ther Act 39765: 12 units Used -- -- -- -- -- -- --         Remaining 12 12 12 12 12 12 12            Precautions: Hx of laparotomy 2022, hx of R patella fracture and medial meniscus tear     7 2 3 4 5 6   Manuals 6/22/22 6/6/22 6/8/22 6/13/22 6/15/22 6/20/22   STM/MFR   Distal R quad      Manual flexibility HS, gastrocs, quads R HS R HS  R HS R HS R HS  R HS   Joint mobs knee Patellar mobs gr II-III in all planes Patellar mobs gr II-III in all planes    Neuro Re-Ed         Quad sets 2x10 (5s)  2x10 (5s)  2x10 (5s)  2x10 (5s)  2x10 (5s)  2x10 (5s)    SLR 2x10  2x5 2x8  2x8  2x10 2x10    Clamshells 20x medium loop 20x GTB  20x GTB 20x GTB 20x medium loop   Bridging 2x10 w/ medium loop hip abd  1x8   2x8  2x10 w/ GTB hip abd 2x10 w/ medium loop hip abd   SLS         SAQ 2x10 (3s)  2x10 (3s)   15x3s  2x10 (3s)  2x10 (3s)   LAQ  2x10 (3s)     2x10 (3s)  2x10 (3s)    Mini squat       10x holding railings   Step up      10x 4" step                              Ther Ex         Nustep Upright bike 10' L2 5' L1 8' L1 10' L2 Upright bike 8' L1 Upright bike 10' L2   PROM R knee Flex/ext Flex/ext Flex/ext Flex/ext  Flex/ext Flex/ext   Heel prop  3'        Heel slides  2x10 (5s)  15x w/ SOS and peanut 15x w/ SOS and peanut 15x w/ SOS and peanut     Gastroc stretch Prostretch 5x10s   5x10s w/ SOS 5x10s w/ SOS Prostretch 5x10s    HS stretch 5x10s    3x10s w/ SOS 5x10s w/ SOS                                                 Ther Activity         Pt education Activity/exercise modification Reviewed HEP FMLA paperwork and reviewed HEP  Updated HEP Reviewed HEP   Stairs         Squats         Gait                                    Modalities         CP R knee 10 min post tx                   Access Code: [email or web address]  URL: [email or web address]  Date: 06/15/2022  Prepared by: Demetrius Connell    Exercises  · Supine Quad Set - 1-2 x daily - 7 x weekly - 2 sets - 10 reps - 5 hold  · Active Straight Leg Raise with Quad Set - 1-2 x daily - 7 x weekly - 2 sets - 10 reps - 1 hold  · Supine Knee Extension Strengthening - 1-2 x daily - 7 x weekly - 2 sets - 10 reps - 3 hold  · Seated Long Arc Quad - 1-2 x daily - 7 x weekly - 2 sets - 10 reps - 3 hold  · Supine Heel Slide with Strap - 1-2 x daily - 7 x weekly - 2 sets - 10 reps - 5 hold

## 2022-06-27 ENCOUNTER — EVALUATION (OUTPATIENT)
Dept: PHYSICAL THERAPY | Facility: CLINIC | Age: 52
End: 2022-06-27
Payer: COMMERCIAL

## 2022-06-27 DIAGNOSIS — S83.241D ACUTE MEDIAL MENISCUS TEAR OF RIGHT KNEE, SUBSEQUENT ENCOUNTER: ICD-10-CM

## 2022-06-27 DIAGNOSIS — S82.034D CLOSED NONDISPLACED TRANSVERSE FRACTURE OF RIGHT PATELLA WITH ROUTINE HEALING, SUBSEQUENT ENCOUNTER: Primary | ICD-10-CM

## 2022-06-27 PROCEDURE — 97112 NEUROMUSCULAR REEDUCATION: CPT

## 2022-06-27 PROCEDURE — 97110 THERAPEUTIC EXERCISES: CPT
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
## 2022-06-29 ENCOUNTER — OFFICE VISIT (OUTPATIENT)
Dept: PHYSICAL THERAPY | Facility: CLINIC | Age: 52
End: 2022-06-29
Payer: COMMERCIAL

## 2022-06-29 DIAGNOSIS — S82.034D CLOSED NONDISPLACED TRANSVERSE FRACTURE OF RIGHT PATELLA WITH ROUTINE HEALING, SUBSEQUENT ENCOUNTER: Primary | ICD-10-CM

## 2022-06-29 DIAGNOSIS — S83.241D ACUTE MEDIAL MENISCUS TEAR OF RIGHT KNEE, SUBSEQUENT ENCOUNTER: ICD-10-CM

## 2022-06-29 PROCEDURE — 97112 NEUROMUSCULAR REEDUCATION: CPT

## 2022-06-29 PROCEDURE — 97110 THERAPEUTIC EXERCISES: CPT

## 2022-06-29 NOTE — PROGRESS NOTES
Daily Note      Today's date: 2022  Patient name: Nicol Espino  : 1970  MRN: [id number]  Referring provider: Melvin Garces*  Dx:   Encounter Diagnosis     ICD-10-CM    1  Closed nondisplaced transverse fracture of right patella with routine healing, subsequent encounter  S82 034D    2  Acute medial meniscus tear of right knee, subsequent encounter  S83 241D        Subjective: Pt reports that she went for a walk for ~3/4 mile on Tuesday night  Pt states she had no pain while walking, but woke up the next day with increased swelling and pain toward the outside of the knee  Pt states that her swelling has improved since  Pt states that after she finishes with school, she wants to get back in the gym and wants to know if this is safe  Objective: See treatment diary below    Assessment: Pt tolerated treatment well  Pt was educated that gym exercise is safe, as long as she takes appropriate precautions  Pt was educated to avoid leg extension and leg curl machines at this time due to forces placed on the patella during these activities  Pt was provided with alternative exercises including LAQ and standing HS curl  Pt introduced to leg press and demonstrated good form, without extension thrust of either knee  Plan: Continue per plan of care  Progress treatment as tolerated  Insurance:  AMA/CMS Eval/ Re-eval POC expires FOTO Auth Status Total   Units  Start date  Expiration date Extension  Visit limitation? PT only or  PT+OT?  Co-Insurance   CMS - auto 22 47/64 Approved       No      54/64                                                         AUTH Status:  Date 5/25 6/6 6/8 6/13 6/15 6/20 6/22 6/27 6/29      Ther Ex 89116: 24 units Used -- 1 1 1 1 1 1 1 2       Remaining  24 23 22 21 20 19 18 17 15      Neuro Re-Ed 22443: 24 units Used -- 1 1 1 1 2 2 2 1       Remaining 24 23 22 21 20 18 16 14 13      Manual Therapy 88407: 12 units Used -- -- 1 1 1 -- -- -- -- Remaining 12 12 11 10 9 9 9 9 9      Ther Act 47976: 12 units Used -- -- -- -- -- -- -- -- --       Remaining 12 12 12 12 12 12 12 12 12          Precautions: Hx of laparotomy 2/2022, hx of R patella fracture and medial meniscus tear     7 8 (RE-EVAL) 9   5 6   Manuals 6/22/22 6/27/22 6/29/22  6/15/22 6/20/22   STM/MFR         Manual flexibility HS, gastrocs, quads R HS R HS  R HS   R HS  R HS   Joint mobs knee     Patellar mobs gr II-III in all planes    Neuro Re-Ed         Quad sets 2x10 (5s)  10x5s  10x5s  2x10 (5s)  2x10 (5s)    SLR 2x10  2x10 2x10   2x10 2x10    Clamshells 20x medium loop 20x medium loop 20x medium loop   20x GTB 20x medium loop   Bridging 2x10 w/ medium loop hip abd  2x10 w/ medium loop hip abd 2x10 w/ medium loop hip abd  2x10 w/ GTB hip abd 2x10 w/ medium loop hip abd   SLS         SAQ 2x10 (3s)  2x10 (3s)  2x10 (3s)   2x10 (3s)  2x10 (3s)   LAQ  2x10 (3s)     2x10 (3s)  2x10 (3s)    Mini squat   10x holding bar    10x holding railings   Step up  15x 4" step     10x 4" step                              Ther Ex         Nustep Upright bike 10' L2 10' L1-3  Upright bike 10' L3   Upright bike 8' L1 Upright bike 10' L2   PROM R knee Flex/ext Flex/ext Flex/ext  Flex/ext Flex/ext   Heel slides  10x5s w/ SOS 2x10 (5s)   15x w/ SOS and peanut     Gastroc stretch Prostretch 5x10s Prostretch 5x10s  Prostretch 5x10s  5x10s w/ SOS Prostretch 5x10s    HS stretch 5x10s     5x10s w/ SOS    Standing HS curl   2x10 no wt       Leg press    2x10 75-85#                                 Ther Activity         Pt education Activity/exercise modification Progress and updated POC   Updated HEP Reviewed HEP   Stairs         Squats         Gait                                    Modalities         CP R knee 10 min post tx                   Access Code: [email or web address]  URL: [email or web address]  Date: 06/15/2022  Prepared by: Anna Riley    Exercises  · Supine Quad Set - 1-2 x daily - 7 x weekly - 2 sets - 10 reps - 5 hold  · Active Straight Leg Raise with Quad Set - 1-2 x daily - 7 x weekly - 2 sets - 10 reps - 1 hold  · Supine Knee Extension Strengthening - 1-2 x daily - 7 x weekly - 2 sets - 10 reps - 3 hold  · Seated Long Arc Quad - 1-2 x daily - 7 x weekly - 2 sets - 10 reps - 3 hold  · Supine Heel Slide with Strap - 1-2 x daily - 7 x weekly - 2 sets - 10 reps - 5 hold

## 2022-07-05 ENCOUNTER — OFFICE VISIT (OUTPATIENT)
Dept: PHYSICAL THERAPY | Facility: CLINIC | Age: 52
End: 2022-07-05
Payer: COMMERCIAL

## 2022-07-05 DIAGNOSIS — S82.034D CLOSED NONDISPLACED TRANSVERSE FRACTURE OF RIGHT PATELLA WITH ROUTINE HEALING, SUBSEQUENT ENCOUNTER: Primary | ICD-10-CM

## 2022-07-05 DIAGNOSIS — S83.241D ACUTE MEDIAL MENISCUS TEAR OF RIGHT KNEE, SUBSEQUENT ENCOUNTER: ICD-10-CM

## 2022-07-05 PROCEDURE — 97110 THERAPEUTIC EXERCISES: CPT

## 2022-07-05 PROCEDURE — 97112 NEUROMUSCULAR REEDUCATION: CPT

## 2022-07-05 NOTE — PROGRESS NOTES
Daily Note      Today's date: 2022  Patient name: Berkley Cardoso  : 1970  MRN: [id number]  Referring provider: Bobby Nguyen*  Dx:   Encounter Diagnosis     ICD-10-CM    1  Closed nondisplaced transverse fracture of right patella with routine healing, subsequent encounter  S82 034D    2  Acute medial meniscus tear of right knee, subsequent encounter  S83 241D        Subjective: Pt reports that her right knee is sore today, which she attributes to walking outside last night for 40 minutes with her   Pt states much of this walking was on even terrain, but some of it was uphill and downhill  Objective: See treatment diary below    Assessment: Pt tolerated treatment well  Pt noted onset of right knee pain while performing step up exercise on 6" step  Pt was also introduced to forward step downs on 4" step and noted no pain with this  Pt noted clicking in the right knee while performing standing HS curl  Plan: Continue per plan of care  Progress treatment as tolerated  Insurance:  AMA/CMS Eval/ Re-eval POC expires FOTO Auth Status Total   Units  Start date  Expiration date Extension  Visit limitation? PT only or  PT+OT?  Co-Insurance   CMS - auto 22 47/64 Approved       No      54/64                                                         AUTH Status:  Date 5/25 6/6 6/8 6/13 6/15 6/20 6/22 6/27 6/29 7/5     Ther Ex 62803: 24 units Used -- 1 1 1 1 1 1 1 2 2      Remaining  24 23 22 21 20 19 18 17 15 13     Neuro Re-Ed 02483: 24 units Used -- 1 1 1 1 2 2 2 1 1      Remaining 24 23 22 21 20 18 16 14 13 12     Manual Therapy 44583: 12 units Used -- -- 1 1 1 -- -- -- -- --      Remaining 12 12 11 10 9 9 9 9 9 9     Ther Act 37568: 12 units Used -- -- -- -- -- -- -- -- -- --      Remaining 12 12 12 12 12 12 12 12 12 12         Precautions: Hx of laparotomy 2022, hx of R patella fracture and medial meniscus tear     7 8 (RE-EVAL) 9  10 11 12   Manuals 22 6/27/22 6/29/22 7/5/22     STM/MFR         Manual flexibility HS, gastrocs, quads R HS R HS  R HS  R HS      Joint mobs knee         Neuro Re-Ed         Quad sets 2x10 (5s)  10x5s  10x5s 10x5s      SLR 2x10  2x10 2x10  2x10      Clamshells 20x medium loop 20x medium loop 20x medium loop  30x medium loop      Bridging 2x10 w/ medium loop hip abd  2x10 w/ medium loop hip abd 2x10 w/ medium loop hip abd 2x10      SLS         SAQ 2x10 (3s)  2x10 (3s)  2x10 (3s)       LAQ  2x10 (3s)    2x10 (3s)      Mini squat   10x holding bar       Step up  15x 4" step   1x10, 1x6  6" step                                Ther Ex         Nustep Upright bike 10' L2 10' L1-3  Upright bike 10' L3  Upright bike 10' L3      PROM R knee Flex/ext Flex/ext Flex/ext Flex/ext     Heel slides  10x5s w/ SOS 2x10 (5s)       Gastroc stretch Prostretch 5x10s Prostretch 5x10s  Prostretch 5x10s Prostretch 5x10s      HS stretch 5x10s         Standing HS curl   2x10 no wt  2x10 no wt      Leg press    2x10 75-85# 2x10 75-85#     Step down    10x 4" step w/ railings                       Ther Activity         Pt education Activity/exercise modification Progress and updated POC  Reviewed POC     Stairs         Squats         Gait                                    Modalities         CP R knee 10 min post tx                   Access Code: [email or web address]  URL: [email or web address]  Date: 06/15/2022  Prepared by: Jc Haji    Exercises  · Supine Quad Set - 1-2 x daily - 7 x weekly - 2 sets - 10 reps - 5 hold  · Active Straight Leg Raise with Quad Set - 1-2 x daily - 7 x weekly - 2 sets - 10 reps - 1 hold  · Supine Knee Extension Strengthening - 1-2 x daily - 7 x weekly - 2 sets - 10 reps - 3 hold  · Seated Long Arc Quad - 1-2 x daily - 7 x weekly - 2 sets - 10 reps - 3 hold  · Supine Heel Slide with Strap - 1-2 x daily - 7 x weekly - 2 sets - 10 reps - 5 hold

## 2022-07-06 ENCOUNTER — OFFICE VISIT (OUTPATIENT)
Dept: PHYSICAL THERAPY | Facility: CLINIC | Age: 52
End: 2022-07-06
Payer: COMMERCIAL

## 2022-07-06 DIAGNOSIS — S83.241D ACUTE MEDIAL MENISCUS TEAR OF RIGHT KNEE, SUBSEQUENT ENCOUNTER: ICD-10-CM

## 2022-07-06 DIAGNOSIS — S82.034D CLOSED NONDISPLACED TRANSVERSE FRACTURE OF RIGHT PATELLA WITH ROUTINE HEALING, SUBSEQUENT ENCOUNTER: Primary | ICD-10-CM

## 2022-07-06 PROCEDURE — 97110 THERAPEUTIC EXERCISES: CPT

## 2022-07-06 PROCEDURE — 97112 NEUROMUSCULAR REEDUCATION: CPT

## 2022-07-06 NOTE — PROGRESS NOTES
Daily Note      Today's date: 2022  Patient name: Alice Ellington  : 1970  MRN: [id number]  Referring provider: Neema Fernandez*  Dx:   Encounter Diagnosis     ICD-10-CM    1  Closed nondisplaced transverse fracture of right patella with routine healing, subsequent encounter  S82 034D    2  Acute medial meniscus tear of right knee, subsequent encounter  S83 241D        Subjective: Pt reports increased soreness of the left knee today, which she attributes to being on her feet more over the past two days at work  Objective: See treatment diary below    Assessment: Pt tolerated treatment well  Pt noted slight increase in pain with right knee flexion PROM at end range  Pt performed step ups on 4" step and was able to complete 20 repetitions without pain  Pt noted "clicking" while performing eccentric forward step down on 4" step, without pain  Pt also noted clicking in the anterior right knee while performing standing HS curl when attaining 90° flexion  Plan: Continue per plan of care  Progress treatment as tolerated  Insurance:  AMA/CMS Eval/ Re-eval POC expires FOTO Auth Status Total   Units  Start date  Expiration date Extension  Visit limitation? PT only or  PT+OT?  Co-Insurance   CMS - auto 22 47/64 Approved       No      54/64                                                         AUTH Status:  Date 5/25 6/6 6/8 6/13 6/15 6/20 6/22 6/27 6/29 7/5 7/6    Ther Ex 10221: 24 units Used -- 1 1 1 1 1 1 1 2 2 2     Remaining  24 23 22 21 20 19 18 17 15 13 11    Neuro Re-Ed 23479: 24 units Used -- 1 1 1 1 2 2 2 1 1 1     Remaining 24 23 22 21 20 18 16 14 13 12 11    Manual Therapy 26944: 12 units Used -- -- 1 1 1 -- -- -- -- -- --     Remaining 12 12 11 10 9 9 9 9 9 9 9    Ther Act 85220: 12 units Used -- -- -- -- -- -- -- -- -- -- --     Remaining 12 12 12 12 12 12 12 12 12 12 12        Precautions: Hx of laparotomy 2022, hx of R patella fracture and medial meniscus tear     7 8 (RE-EVAL) 9  10 11 12   Manuals 6/22/22 6/27/22 6/29/22 7/5/22 7/6/22    STM/MFR         Manual flexibility HS, gastrocs, quads R HS R HS  R HS  R HS  R HS     Joint mobs knee         Neuro Re-Ed         Quad sets 2x10 (5s)  10x5s  10x5s 10x5s  10x5s    SLR 2x10  2x10 2x10  2x10  2x10     Clamshells 20x medium loop 20x medium loop 20x medium loop  30x medium loop  30x medium loop    Bridging 2x10 w/ medium loop hip abd  2x10 w/ medium loop hip abd 2x10 w/ medium loop hip abd 2x10  2x10 w/ medium loop hip abd    SLS         SAQ 2x10 (3s)  2x10 (3s)  2x10 (3s)       LAQ  2x10 (3s)    2x10 (3s)  2x10 (3s)     Mini squat   10x holding bar       Step up  15x 4" step   1x10, 1x6  6" step 2x10 4" step                               Ther Ex         Nustep Upright bike 10' L2 10' L1-3  Upright bike 10' L3  Upright bike 10' L3  Upright bike 10' L3    PROM R knee Flex/ext Flex/ext Flex/ext Flex/ext Flex/ext    Heel slides  10x5s w/ SOS 2x10 (5s)       Gastroc stretch Prostretch 5x10s Prostretch 5x10s  Prostretch 5x10s Prostretch 5x10s  Prostretch 5x10s     HS stretch 5x10s         Standing HS curl   2x10 no wt  2x10 no wt  2x10 no wt     Leg press    2x10 75-85# 2x10 75-85#     Step down    10x 4" step w/ railings 2x10 4" step w/ railings                      Ther Activity         Pt education Activity/exercise modification Progress and updated POC  Reviewed POC     Stairs         Squats         Gait                                    Modalities         CP R knee 10 min post tx                   Access Code: [email or web address]  URL: [email or web address]  Date: 06/15/2022  Prepared by: Jc Haji    Exercises  · Supine Quad Set - 1-2 x daily - 7 x weekly - 2 sets - 10 reps - 5 hold  · Active Straight Leg Raise with Quad Set - 1-2 x daily - 7 x weekly - 2 sets - 10 reps - 1 hold  · Supine Knee Extension Strengthening - 1-2 x daily - 7 x weekly - 2 sets - 10 reps - 3 hold  · Seated Long Arc Quad - 1-2 x daily - 7 x weekly - 2 sets - 10 reps - 3 hold  · Supine Heel Slide with Strap - 1-2 x daily - 7 x weekly - 2 sets - 10 reps - 5 hold

## 2022-07-11 ENCOUNTER — OFFICE VISIT (OUTPATIENT)
Dept: PHYSICAL THERAPY | Facility: CLINIC | Age: 52
End: 2022-07-11
Payer: COMMERCIAL

## 2022-07-11 ENCOUNTER — TELEPHONE (OUTPATIENT)
Dept: PHYSICAL THERAPY | Facility: CLINIC | Age: 52
End: 2022-07-11

## 2022-07-11 DIAGNOSIS — S83.241D ACUTE MEDIAL MENISCUS TEAR OF RIGHT KNEE, SUBSEQUENT ENCOUNTER: ICD-10-CM

## 2022-07-11 DIAGNOSIS — S82.034D CLOSED NONDISPLACED TRANSVERSE FRACTURE OF RIGHT PATELLA WITH ROUTINE HEALING, SUBSEQUENT ENCOUNTER: Primary | ICD-10-CM

## 2022-07-11 PROCEDURE — 97112 NEUROMUSCULAR REEDUCATION: CPT

## 2022-07-11 PROCEDURE — 97110 THERAPEUTIC EXERCISES: CPT

## 2022-07-11 NOTE — TELEPHONE ENCOUNTER
Called patient to inform of insurance requirements in order to submit for additional authorization for auto claim  Educated patient that a new referral and orthopedic follow up notes are required in order to submit for additional authorization  Patient verbalized understanding and agreed to schedule an orthopedic follow up visit  Patient verbalized understanding that future PT visits will be canceled at this time due to no authorization at this time  PT to provide patient with updated HEP via email   Patient was encouraged to reach out if she requires guidance with continuing home exercise program

## 2022-07-11 NOTE — PROGRESS NOTES
Daily Note      Today's date: 2022  Patient name: Kyler Adkins  : 1970  MRN: [id number]  Referring provider: Travis Saavedra*  Dx:   Encounter Diagnosis     ICD-10-CM    1  Closed nondisplaced transverse fracture of right patella with routine healing, subsequent encounter  S82 034D    2  Acute medial meniscus tear of right knee, subsequent encounter  S83 241D         Subjective: Pt reports slight soreness in the right knee  Pt states she did not do anything out of the ordinary, but did some yard work  Objective: See treatment diary below    Assessment: Pt tolerated treatment well  Pt was able to resume with step ups on 6" step and noted increase of anterior discomfort to 4-5/10, which did not worsen with more repetitions  Pt was able to progress to forward step downs without use of railings and tolerated well  Pt introduced to lateral walks with theraband resistance and noted hip external rotation while stepping to the right, likely due to TFL compensation  Pt demonstrates good awareness to self-correct for this compensation  Plan: Continue per plan of care  Progress treatment as tolerated  Insurance:  AMA/CMS Eval/ Re-eval POC expires FOTO Auth Status Total   Units  Start date  Expiration date Extension  Visit limitation? PT only or  PT+OT?  Co-Insurance   CMS - auto 22 47/64 Approved       No      64                                                         AUTH Status:  Date 5/25 6/6 6/8 6/13 6/15 6/20 6/22 6/27 6/29 7/5 7   Ther Ex 11309: 24 units Used -- 1 1 1 1 1 1 1 2 2 2 1    Remaining  24 23 22 21 20 19 18 17 15 13 11 10   Neuro Re-Ed 84914: 24 units Used -- 1 1 1 1 2 2 2 1 1 1 1    Remaining 24 23 22 21 20 18 16 14 13 12 11 10   Manual Therapy 92018: 12 units Used -- -- 1 1 1 -- -- -- -- -- -- --    Remaining 12 12 11 10 9 9 9 9 9 9 9 9   Ther Act 16389: 12 units Used -- -- -- -- -- -- -- -- -- -- -- --    Remaining 12 12 12 12 12 12 12 12 12 12 12 12       1  5/25 2    6/6 3    6/8 4    6/13 5    6/15 6    6/20   7    6/22 8   6/27  9   6/29  10   7/5 11   7/6  12   7/11    13   14   15   16   17   18     19   20  21  22  23  24     25   26  27  28  29  30    31  32   33  34  35  36        Precautions: Hx of laparotomy 2/2022, hx of R patella fracture and medial meniscus tear     7 8 (RE-EVAL) 9  10 11 12   Manuals 6/22/22 6/27/22 6/29/22 7/5/22 7/6/22 7/11/22   STM/MFR         Manual flexibility HS, gastrocs, quads R HS R HS  R HS  R HS  R HS     Joint mobs knee         Neuro Re-Ed         Quad sets 2x10 (5s)  10x5s  10x5s 10x5s  10x5s    SLR 2x10  2x10 2x10  2x10  2x10  2x10    Clamshells 20x medium loop 20x medium loop 20x medium loop  30x medium loop  30x medium loop    Bridging 2x10 w/ medium loop hip abd  2x10 w/ medium loop hip abd 2x10 w/ medium loop hip abd 2x10  2x10 w/ medium loop hip abd 2x10    SLS         SAQ 2x10 (3s)  2x10 (3s)  2x10 (3s)       LAQ  2x10 (3s)    2x10 (3s)  2x10 (3s)  2x10 (3s)    Mini squat   10x holding bar    2x10 holding bar   Step up  15x 4" step   1x10, 1x6  6" step 2x10 4" step 2x10 6" step   Lateral walks      6x12 feet light loop                     Ther Ex         Nustep Upright bike 10' L2 10' L1-3  Upright bike 10' L3  Upright bike 10' L3  Upright bike 10' L3 Nustep 10' L4    PROM R knee Flex/ext Flex/ext Flex/ext Flex/ext Flex/ext Flex/ext   Heel slides  10x5s w/ SOS 2x10 (5s)       Gastroc stretch Prostretch 5x10s Prostretch 5x10s  Prostretch 5x10s Prostretch 5x10s  Prostretch 5x10s  Prostretch 5x15s   HS stretch 5x10s         Standing HS curl   2x10 no wt  2x10 no wt  2x10 no wt     Leg press    2x10 75-85# 2x10 75-85#     Step down    10x 4" step w/ railings 2x10 4" step w/ railings 2x10 4" no railings                     Ther Activity         Pt education Activity/exercise modification Progress and updated POC  Reviewed POC     Stairs         Squats         Gait Modalities         CP R knee 10 min post tx                   Access Code: [email or web address]  URL: [email or web address]  Date: 06/15/2022  Prepared by: Brain Francois    Exercises  · Supine Quad Set - 1-2 x daily - 7 x weekly - 2 sets - 10 reps - 5 hold  · Active Straight Leg Raise with Quad Set - 1-2 x daily - 7 x weekly - 2 sets - 10 reps - 1 hold  · Supine Knee Extension Strengthening - 1-2 x daily - 7 x weekly - 2 sets - 10 reps - 3 hold  · Seated Long Arc Quad - 1-2 x daily - 7 x weekly - 2 sets - 10 reps - 3 hold  · Supine Heel Slide with Strap - 1-2 x daily - 7 x weekly - 2 sets - 10 reps - 5 hold

## 2022-07-12 ENCOUNTER — TELEPHONE (OUTPATIENT)
Dept: OBGYN CLINIC | Facility: HOSPITAL | Age: 52
End: 2022-07-12

## 2022-07-12 DIAGNOSIS — S82.034A CLOSED NONDISPLACED TRANSVERSE FRACTURE OF RIGHT PATELLA, INITIAL ENCOUNTER: Primary | ICD-10-CM

## 2022-07-12 DIAGNOSIS — S83.241A ACUTE MEDIAL MENISCUS TEAR OF RIGHT KNEE, INITIAL ENCOUNTER: ICD-10-CM

## 2022-07-12 NOTE — TELEPHONE ENCOUNTER
Patient sees Dr Hermila Daley adjustor is requesting an updated PT script be faxed so they can approve more PT visits  Patient would like to know if this can just be done or does she have to come in for an appt?       FAX: [phone] attn:Talya

## 2022-07-12 NOTE — TELEPHONE ENCOUNTER
Antonietta Palacios I placed new PT order  Can front staff fill out that auto form and add more PT and fax to [facility] at below fax number?

## 2022-07-13 ENCOUNTER — APPOINTMENT (OUTPATIENT)
Dept: PHYSICAL THERAPY | Facility: CLINIC | Age: 52
End: 2022-07-13
Payer: COMMERCIAL

## 2022-07-14 ENCOUNTER — OFFICE VISIT (OUTPATIENT)
Dept: OBGYN CLINIC | Facility: CLINIC | Age: 52
End: 2022-07-14
Payer: COMMERCIAL

## 2022-07-14 ENCOUNTER — APPOINTMENT (OUTPATIENT)
Dept: PHYSICAL THERAPY | Facility: CLINIC | Age: 52
End: 2022-07-14
Payer: COMMERCIAL

## 2022-07-14 VITALS — SYSTOLIC BLOOD PRESSURE: 110 MMHG | DIASTOLIC BLOOD PRESSURE: 70 MMHG | WEIGHT: 186 LBS | BODY MASS INDEX: 29.13 KG/M2

## 2022-07-14 DIAGNOSIS — Z00.00 HEALTHCARE MAINTENANCE: ICD-10-CM

## 2022-07-14 DIAGNOSIS — Z12.39 ENCOUNTER FOR SCREENING FOR MALIGNANT NEOPLASM OF BREAST, UNSPECIFIED SCREENING MODALITY: ICD-10-CM

## 2022-07-14 DIAGNOSIS — Z01.419 WOMEN'S ANNUAL ROUTINE GYNECOLOGICAL EXAMINATION: Primary | ICD-10-CM

## 2022-07-14 DIAGNOSIS — N95.1 SYMPTOMATIC MENOPAUSAL OR FEMALE CLIMACTERIC STATES: ICD-10-CM

## 2022-07-14 DIAGNOSIS — Z80.3 FAMILY HISTORY OF BREAST CANCER: ICD-10-CM

## 2022-07-14 PROCEDURE — S0610 ANNUAL GYNECOLOGICAL EXAMINA: HCPCS | Performed by: STUDENT IN AN ORGANIZED HEALTH CARE EDUCATION/TRAINING PROGRAM

## 2022-07-14 RX ORDER — ESTRADIOL 1 MG/1
1 TABLET ORAL DAILY
Qty: 90 TABLET | Refills: 0 | Status: SHIPPED | OUTPATIENT
Start: 2022-07-14

## 2022-07-14 RX ORDER — ESTRADIOL 1 MG/1
1 TABLET ORAL DAILY
Qty: 90 TABLET | Refills: 3 | Status: SHIPPED | OUTPATIENT
Start: 2022-07-14

## 2022-07-14 NOTE — PROGRESS NOTES
[facility]  Well Woman Annual Exam  Anand    Assessment   Sheldon Rodriguez is a 46 y o  postmenopausal female presenting for annual exam      Plan   1  Women's annual routine gynecological examination  Mammo screening bilateral w 3d & cad   2  Healthcare maintenance  Ambulatory referral to Obstetrics / Gynecology   3  Family history of breast cancer  Ambulatory Referral to Oncology Genetics   4  Symptomatic menopausal or female climacteric states  estradiol (Estrace) 1 mg tablet    estradiol (Estrace) 1 mg tablet   5  Encounter for screening for malignant neoplasm of breast, unspecified screening modality  Mammo screening bilateral w 3d & cad        · The patient previously had remotely abnormal pap, hysterectomy for AUB, but told she should continue pap smears  Last pap 2021 and normal, will repeat in 3 years  Reviewed will need 25 years of normal paps before discontinuing IF she did in fact have a CKC, which she is uncertain about  Otherwise can stop at 65  · The patient declines STD testing  · Last Mammogram: 2022, mammogram ordered today  Reviewed ACOG recommendations of yearly mammogram for breast cancer screening   · Last Colonoscopy 2020, Patient due  · I reviewed the patients risk factors for osteoporosis, hysterectomy 2016, will start bone density scans in 2026  · I emphasized the importance of an annual pelvic and breast exam    · I have discussed the importance of monthly self-breast exams, exercise and healthy diet as well as adequate intake of calcium and vitamin D      · Has been on HRT since hysterectomy in 2016--reviewed generally recommend lowest dose possible for shortest time possible given associated risks of DVT/VTE/hypertension and other cardiovascular events  Is ready to start decreasing dose  Changed from 2mg QD to 1mg QD, will reassess PRN    · All questions answered to the best of my ability         __________________________________________________________________      Subjective     46 y o  postmenopausal female presenting for annual exam  She is without complaint  GYN  No bleeding since hysterectomy  Menopausal symptoms: estradiol 2mg since hysterectomy in 2016  No hx DVT/VTE/flashes/CHTN     TLH-BSO for AUB, remotely had mildly abnormal paps but not sure if every had colpo/CKC/LEEP  Was told by gyn that she should continue  No bleeding since hysterectomy  No vaginal discharge  No pelvic pain  Some vaginal dryness (had tried estrogen cream, but doesn't like it, stopped using it)  No pain with sex  No STI (stable partner, )    OB  [id number]       Complaints: denies  Denies hematuria, urinary incontinence and dysuria    BREAST  Complaints: denies  Denies: breast lump, nipple discharge, skin color change and skin lesion(s)  Last mammogram: 2022  Personal hx: none    Family hx: mom had breast cancer in 62s, dad's mother had breast cancer in hers 79s, mom's sister had breast cancer  denies fhx of uterine, ovarian, or colon cancers  Patient does do regular self-exams    GENERAL  PMH reviewed/updated and is as below  Patient does follow with a PCP  Works as a [place] [employer] at [employer]  Denies domestic violence  Exercise: prior to surgery had been running, walking, weight lifting--working on increasing today  Diet: well-balanced diet, calcium in diet    SCREENING  Cervical Ca: Last pap 2021 and results were normal  Breast Ca: Last mammogram 2022  Colon Ca: Last colonoscopy 2020 and will repeat in 10 years, folowing with GI    Metabolic: 6959    History reviewed  No pertinent past medical history      Past Surgical History:   Procedure Laterality Date    APPENDECTOMY      Last assessed 11/16/2016     HYSTERECTOMY      HYSTEROSCOPY W/ ENDOMETRIAL ABLATION      Last assessed 12/15/2014     LAPAROTOMY N/A 2/8/2022    Procedure: LAPAROTOMY EXPLORATORY, EXTENSIVE SMALL BOWEL RESECTION;  Surgeon: Sarina García MD;  [facility];  Service: General    OOPHORECTOMY      TUBAL LIGATION      Last assessed 12/15/2014          Current Outpatient Medications:     acetaminophen (TYLENOL) 325 mg tablet, Take 2 tablets (650 mg total) by mouth every 6 (six) hours as needed for mild pain, Disp: , Rfl: 0    aspirin 81 mg chewable tablet, Chew 1 tablet daily, Disp: , Rfl:     Calcium Carbonate-Vit D-Min (CALCIUM 1200 PO), Take by mouth, Disp: , Rfl:     cholestyramine (QUESTRAN) 4 g packet, Take 1 packet (4 g total) by mouth 3 (three) times a day with meals, Disp: 30 packet, Rfl: 0    estradiol (Estrace) 1 mg tablet, Take 1 tablet (1 mg total) by mouth daily, Disp: 90 tablet, Rfl: 0    estradiol (Estrace) 1 mg tablet, Take 1 tablet (1 mg total) by mouth daily, Disp: 90 tablet, Rfl: 3    famotidine (PEPCID) 40 MG tablet, Take 1 tablet (40 mg total) by mouth daily at bedtime, Disp: 90 tablet, Rfl: 3    ALPRAZolam (XANAX) 0 25 mg tablet, Take 1 tablet (0 25 mg total) by mouth every 6 (six) hours as needed for anxiety (avoid with opioids) (Patient not taking: Reported on 2022), Disp: 40 tablet, Rfl: 0    No Known Allergies    Social History     Socioeconomic History    Marital status: /Civil Union     Spouse name: Not on file    Number of children: Not on file    Years of education: Not on file    Highest education level: Not on file   Occupational History    Not on file   Tobacco Use    Smoking status: Former Smoker     Quit date:      Years since quittin 5    Smokeless tobacco: Never Used   Vaping Use    Vaping Use: Never used   Substance and Sexual Activity    Alcohol use: Yes     Comment: occ    Drug use: No    Sexual activity: Yes   Other Topics Concern    Not on file   Social History Narrative    Former smoker - As per [employer] [facility]      Social Determinants of Health     Financial Resource Strain: Not on file   Food Insecurity: No Food Insecurity    Worried About Running Out of Food in the Last Year: Never true    Cornelia of Food in the Last Year: Never true   Transportation Needs: No Transportation Needs    Lack of Transportation (Medical): No    Lack of Transportation (Non-Medical): No   Physical Activity: Not on file   Stress: Not on file   Social Connections: Not on file   Intimate Partner Violence: Not on file   Housing Stability: Low Risk     Unable to Pay for Housing in the Last Year: No    Number of Places Lived in the Last Year: 1    Unstable Housing in the Last Year: No            Review of Systems  Review of Systems   Constitutional: Negative for unexpected weight change  Respiratory: Negative for shortness of breath  Cardiovascular: Negative for chest pain  Gastrointestinal: Negative for abdominal pain  Genitourinary: Negative for pelvic pain and vaginal bleeding  Skin: Negative for color change and rash  Psychiatric/Behavioral: The patient is not nervous/anxious  Objective  /70 (BP Location: Right arm, Patient Position: Sitting, Cuff Size: Large)   Wt 84 4 kg (186 lb)   BMI 29 13 kg/m²      Physical Exam:  Physical Exam  Constitutional:       Appearance: Normal appearance  She is normal weight  Eyes:      Extraocular Movements: Extraocular movements intact  Conjunctiva/sclera: Conjunctivae normal    Neck:      Comments: No thyromegaly or masses  Cardiovascular:      Rate and Rhythm: Normal rate and regular rhythm  Heart sounds: Normal heart sounds  Pulmonary:      Effort: Pulmonary effort is normal       Breath sounds: Normal breath sounds  Abdominal:      General: Abdomen is flat  There is no distension  Palpations: Abdomen is soft  There is no mass  Tenderness: There is no abdominal tenderness     Genitourinary:     Comments: Vulva: normal, no lesions  Vagina: normal, no lesions or ttp  Urethra: normal, no lesions, masses or ttp  Bladder: normal, no masses or ttp  Cervix: surgically absent  Uterus: surgically absent  Adnexa: no masses or ttp  Musculoskeletal:         General: Normal range of motion  Cervical back: Normal range of motion  Skin:     General: Skin is warm and dry  Neurological:      General: No focal deficit present  Psychiatric:         Mood and Affect: Mood normal          Behavior: Behavior normal          Thought Content:  Thought content normal        Breast inspection negative, no nipple discharge or bleeding, no masses or nodularity palpable  Rectal negative, stool guaiac negative

## 2022-07-18 ENCOUNTER — APPOINTMENT (OUTPATIENT)
Dept: PHYSICAL THERAPY | Facility: CLINIC | Age: 52
End: 2022-07-18
Payer: COMMERCIAL

## 2022-07-19 ENCOUNTER — TELEPHONE (OUTPATIENT)
Dept: GENETICS | Facility: CLINIC | Age: 52
End: 2022-07-19

## 2022-07-19 NOTE — TELEPHONE ENCOUNTER
I called Leslee to schedule a new patient appointment with the [facility]       Outcome:   I left a voice message encouraging the patient to call the genetics team at [phone] to schedule this appointment  Follow-up:   At this time the referral will be closed and we will wait to hear back from the patient regarding scheduling this appointment

## 2022-07-20 ENCOUNTER — APPOINTMENT (OUTPATIENT)
Dept: PHYSICAL THERAPY | Facility: CLINIC | Age: 52
End: 2022-07-20
Payer: COMMERCIAL

## 2022-07-25 ENCOUNTER — APPOINTMENT (OUTPATIENT)
Dept: PHYSICAL THERAPY | Facility: CLINIC | Age: 52
End: 2022-07-25
Payer: COMMERCIAL

## 2022-07-27 ENCOUNTER — APPOINTMENT (OUTPATIENT)
Dept: PHYSICAL THERAPY | Facility: CLINIC | Age: 52
End: 2022-07-27
Payer: COMMERCIAL

## 2022-07-27 NOTE — TELEPHONE ENCOUNTER
Patient called to check status of [employer] form     Patient states auth needed through [facility]     FAX# [id number] [address] [phone]

## 2022-08-03 ENCOUNTER — APPOINTMENT (OUTPATIENT)
Dept: RADIOLOGY | Facility: CLINIC | Age: 52
End: 2022-08-03
Payer: COMMERCIAL

## 2022-08-03 ENCOUNTER — OFFICE VISIT (OUTPATIENT)
Dept: OBGYN CLINIC | Facility: CLINIC | Age: 52
End: 2022-08-03
Payer: COMMERCIAL

## 2022-08-03 VITALS
DIASTOLIC BLOOD PRESSURE: 85 MMHG | SYSTOLIC BLOOD PRESSURE: 133 MMHG | HEART RATE: 65 BPM | BODY MASS INDEX: 29.19 KG/M2 | WEIGHT: 186 LBS | HEIGHT: 67 IN

## 2022-08-03 DIAGNOSIS — S82.034A CLOSED NONDISPLACED TRANSVERSE FRACTURE OF RIGHT PATELLA, INITIAL ENCOUNTER: ICD-10-CM

## 2022-08-03 DIAGNOSIS — S82.034D CLOSED NONDISPLACED TRANSVERSE FRACTURE OF RIGHT PATELLA WITH ROUTINE HEALING, SUBSEQUENT ENCOUNTER: Primary | ICD-10-CM

## 2022-08-03 PROCEDURE — 73562 X-RAY EXAM OF KNEE 3: CPT

## 2022-08-03 PROCEDURE — 99213 OFFICE O/P EST LOW 20 MIN: CPT | Performed by: PHYSICIAN ASSISTANT
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
## 2022-08-04 ENCOUNTER — OFFICE VISIT (OUTPATIENT)
Dept: GASTROENTEROLOGY | Facility: CLINIC | Age: 52
End: 2022-08-04
Payer: COMMERCIAL

## 2022-08-04 VITALS
SYSTOLIC BLOOD PRESSURE: 121 MMHG | HEIGHT: 67 IN | WEIGHT: 185 LBS | BODY MASS INDEX: 29.03 KG/M2 | HEART RATE: 81 BPM | DIASTOLIC BLOOD PRESSURE: 87 MMHG

## 2022-08-04 DIAGNOSIS — R19.7 DIARRHEA, UNSPECIFIED TYPE: Primary | ICD-10-CM

## 2022-08-04 DIAGNOSIS — Z90.49 HISTORY OF BOWEL RESECTION: ICD-10-CM

## 2022-08-04 PROCEDURE — 99214 OFFICE O/P EST MOD 30 MIN: CPT | Performed by: INTERNAL MEDICINE

## 2022-08-04 RX ORDER — LOPERAMIDE HYDROCHLORIDE 2 MG/1
4 TABLET ORAL DAILY
Qty: 60 TABLET | Refills: 3 | Status: SHIPPED | OUTPATIENT
Start: 2022-08-04
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
## 2022-08-15 ENCOUNTER — EVALUATION (OUTPATIENT)
Dept: PHYSICAL THERAPY | Facility: CLINIC | Age: 52
End: 2022-08-15

## 2022-08-15 DIAGNOSIS — S83.241D ACUTE MEDIAL MENISCUS TEAR OF RIGHT KNEE, SUBSEQUENT ENCOUNTER: ICD-10-CM

## 2022-08-15 DIAGNOSIS — S82.034D CLOSED NONDISPLACED TRANSVERSE FRACTURE OF RIGHT PATELLA WITH ROUTINE HEALING: Primary | ICD-10-CM

## 2022-08-15 DIAGNOSIS — S82.034D CLOSED NONDISPLACED TRANSVERSE FRACTURE OF RIGHT PATELLA WITH ROUTINE HEALING, SUBSEQUENT ENCOUNTER: ICD-10-CM
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
## 2022-08-30 ENCOUNTER — APPOINTMENT (OUTPATIENT)
Dept: LAB | Facility: CLINIC | Age: 52
End: 2022-08-30

## 2022-08-30 DIAGNOSIS — Z02.1 PRE-EMPLOYMENT EXAMINATION: ICD-10-CM

## 2022-08-30 DIAGNOSIS — K58.0 IRRITABLE BOWEL SYNDROME WITH DIARRHEA: Primary | ICD-10-CM

## 2022-08-30 LAB
MEV IGG SER QL IA: NORMAL
MUV IGG SER QL IA: ABNORMAL
RUBV IGG SERPL IA-ACNC: >175 IU/ML
VZV IGG SER QL IA: NORMAL

## 2022-08-30 PROCEDURE — 86762 RUBELLA ANTIBODY: CPT

## 2022-08-30 PROCEDURE — 86787 VARICELLA-ZOSTER ANTIBODY: CPT

## 2022-08-30 PROCEDURE — 36415 COLL VENOUS BLD VENIPUNCTURE: CPT

## 2022-08-30 PROCEDURE — 86735 MUMPS ANTIBODY: CPT

## 2022-08-30 PROCEDURE — 86765 RUBEOLA ANTIBODY: CPT

## 2022-08-30 PROCEDURE — 86480 TB TEST CELL IMMUN MEASURE: CPT

## 2022-08-30 RX ORDER — ELUXADOLINE 75 MG/1
1 TABLET, FILM COATED ORAL 2 TIMES DAILY
Qty: 60 TABLET | Refills: 5 | Status: SHIPPED | OUTPATIENT
Start: 2022-08-30

## 2022-08-31 LAB
GAMMA INTERFERON BACKGROUND BLD IA-ACNC: 0.04 IU/ML
M TB IFN-G BLD-IMP: NEGATIVE
M TB IFN-G CD4+ BCKGRND COR BLD-ACNC: 0 IU/ML
M TB IFN-G CD4+ BCKGRND COR BLD-ACNC: 0 IU/ML
MITOGEN IGNF BCKGRD COR BLD-ACNC: 4.04 IU/ML

## 2022-09-20 DIAGNOSIS — Z01.84 IMMUNITY STATUS TESTING: Primary | ICD-10-CM

## 2022-09-27 ENCOUNTER — APPOINTMENT (OUTPATIENT)
Dept: LAB | Facility: HOSPITAL | Age: 52
End: 2022-09-27

## 2022-09-27 DIAGNOSIS — Z01.84 IMMUNITY STATUS TESTING: ICD-10-CM

## 2022-09-27 LAB — MUV IGG SER QL IA: ABNORMAL

## 2022-09-27 PROCEDURE — 86735 MUMPS ANTIBODY: CPT

## 2022-09-27 PROCEDURE — 36415 COLL VENOUS BLD VENIPUNCTURE: CPT
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
## 2022-10-11 PROBLEM — J01.00 ACUTE NON-RECURRENT MAXILLARY SINUSITIS: Status: RESOLVED | Noted: 2022-04-26 | Resolved: 2022-10-11

## 2022-12-02 ENCOUNTER — OFFICE VISIT (OUTPATIENT)
Dept: GASTROENTEROLOGY | Facility: CLINIC | Age: 52
End: 2022-12-02

## 2022-12-02 VITALS
SYSTOLIC BLOOD PRESSURE: 152 MMHG | BODY MASS INDEX: 31.39 KG/M2 | HEIGHT: 67 IN | HEART RATE: 79 BPM | WEIGHT: 200 LBS | DIASTOLIC BLOOD PRESSURE: 99 MMHG

## 2022-12-02 DIAGNOSIS — K64.4 EXTERNAL HEMORRHOID: Primary | ICD-10-CM

## 2022-12-02 DIAGNOSIS — K58.0 IRRITABLE BOWEL SYNDROME WITH DIARRHEA: ICD-10-CM

## 2023-02-13 DIAGNOSIS — N95.1 SYMPTOMATIC MENOPAUSAL OR FEMALE CLIMACTERIC STATES: ICD-10-CM

## 2023-02-13 RX ORDER — ESTRADIOL 1 MG/1
1 TABLET ORAL DAILY
Qty: 90 TABLET | Refills: 0 | Status: SHIPPED | OUTPATIENT
Start: 2023-02-13

## 2023-04-09 PROBLEM — F51.02 ADJUSTMENT INSOMNIA: Status: RESOLVED | Noted: 2022-02-22 | Resolved: 2023-04-09

## 2023-04-09 PROBLEM — M25.571 ACUTE RIGHT ANKLE PAIN: Status: RESOLVED | Noted: 2022-02-13 | Resolved: 2023-04-09

## 2023-04-09 PROBLEM — K55.9 SMALL BOWEL ISCHEMIA (HCC): Status: RESOLVED | Noted: 2022-02-09 | Resolved: 2023-04-09

## 2023-04-09 PROBLEM — D64.9 ANEMIA: Status: RESOLVED | Noted: 2022-02-09 | Resolved: 2023-04-09

## 2023-04-09 PROBLEM — K92.2 GI BLEEDING: Status: RESOLVED | Noted: 2022-02-08 | Resolved: 2023-04-09

## 2023-04-09 PROBLEM — E66.3 OVERWEIGHT: Status: RESOLVED | Noted: 2021-09-28 | Resolved: 2023-04-09

## 2023-04-09 PROBLEM — E66.3 OVERWEIGHT (BMI 25.0-29.9): Status: RESOLVED | Noted: 2021-05-10 | Resolved: 2023-04-09

## 2023-04-09 PROBLEM — F41.8 SITUATIONAL ANXIETY: Status: RESOLVED | Noted: 2022-02-22 | Resolved: 2023-04-09

## 2023-04-12 PROBLEM — K43.9 VENTRAL HERNIA WITHOUT OBSTRUCTION OR GANGRENE: Status: ACTIVE | Noted: 2023-04-12

## 2023-04-12 PROBLEM — E66.9 OBESITY (BMI 30-39.9): Status: ACTIVE | Noted: 2023-04-12

## 2023-04-24 ENCOUNTER — CONSULT (OUTPATIENT)
Dept: SURGERY | Facility: CLINIC | Age: 53
End: 2023-04-24

## 2023-04-24 VITALS
DIASTOLIC BLOOD PRESSURE: 90 MMHG | WEIGHT: 194 LBS | SYSTOLIC BLOOD PRESSURE: 150 MMHG | BODY MASS INDEX: 30.45 KG/M2 | HEART RATE: 68 BPM | HEIGHT: 67 IN

## 2023-04-24 DIAGNOSIS — K43.9 VENTRAL HERNIA WITHOUT OBSTRUCTION OR GANGRENE: ICD-10-CM

## 2023-04-24 DIAGNOSIS — K21.9 GERD (GASTROESOPHAGEAL REFLUX DISEASE): Primary | ICD-10-CM

## 2023-04-24 DIAGNOSIS — K43.2 INCISIONAL HERNIA WITHOUT OBSTRUCTION OR GANGRENE: ICD-10-CM

## 2023-04-24 RX ORDER — PANTOPRAZOLE SODIUM 40 MG/1
40 TABLET, DELAYED RELEASE ORAL DAILY
Qty: 90 TABLET | Refills: 0 | Status: SHIPPED | OUTPATIENT
Start: 2023-04-24

## 2023-04-24 NOTE — PROGRESS NOTES
Assessment/Plan: Patient presents with an incisional hernia above the umbilicus  This is been present for 6 months  It is enlarging and is at times painful  She desires repair  Risks and benefits were discussed  All questions answered  She also has a history for GERD  Pepcid has not relieved her of the burning at the back of her throat  I provided a 3-month prescription for Protonix  Diagnoses and all orders for this visit:    Ventral hernia without obstruction or gangrene  -     Ambulatory referral to General Surgery        Subjective:      Patient ID: Rubia Bennett is a 48 y o  female  Patient presents for ventral hernia consult  States she has had a bulge and intermittent dull pain on her mid abdomen for 6 months  Does not limit her activities  Ultrasound abdominal wall 4/18/2023   IMPRESSION:  Patient's lump in the midline anterior abdominal wall, located 6 cm superior to the umbilicus is shown to be a ventral abdominal wall hernia, with orifice measuring 2 2 cm in diameter, containing a large amount of mesenteric fat measuring 6 4 x 3 6 x 7 4   cm  There is no herniated bowel loops  The following portions of the patient's history were reviewed and updated as appropriate:     She  has a past medical history of Anemia (02/2022), Colon polyp, Hemorrhoids, History of transfusion (02/2022), and Irritable bowel syndrome  She  has a past surgical history that includes Hysterectomy; Oophorectomy; Tubal ligation; Appendectomy; Hysteroscopy w/ endometrial ablation; LAPAROTOMY (N/A, 02/08/2022); Colonoscopy; and Bowel resection (02/08/2022)  Her family history includes Basal cell carcinoma in her brother and sister; Breast cancer in her mother and paternal grandmother; Breast cancer (age of onset: 80) in her maternal aunt; Cancer in her father and mother; Colon cancer in her maternal aunt; Salima's disease in her family; Motor neuron disease in her mother    She  reports that she quit smoking "about 20 years ago  Her smoking use included cigarettes  She has a 5 00 pack-year smoking history  She has never been exposed to tobacco smoke  She has never used smokeless tobacco  She reports current alcohol use  She reports that she does not use drugs  Current Outpatient Medications   Medication Sig Dispense Refill   • acetaminophen (TYLENOL) 325 mg tablet Take 2 tablets (650 mg total) by mouth every 6 (six) hours as needed for mild pain  0   • aspirin 81 mg chewable tablet Chew 1 tablet daily     • Calcium Carbonate-Vit D-Min (CALCIUM 1200 PO) Take by mouth     • estradiol (Estrace) 1 mg tablet Take 1 tablet (1 mg total) by mouth daily 90 tablet 0   • famotidine (PEPCID) 40 MG tablet Take 1 tablet (40 mg total) by mouth daily at bedtime (Patient taking differently: Take 40 mg by mouth 2 (two) times a day) 90 tablet 3   • loperamide (IMODIUM A-D) 2 MG tablet Take 2 tablets (4 mg total) by mouth in the morning 60 tablet 3   • phentermine (ADIPEX-P) 37 5 MG tablet Take 1 tablet (37 5 mg total) by mouth every morning (before breakfast) 90 tablet 0     No current facility-administered medications for this visit  She has No Known Allergies       Review of Systems   Constitutional: Negative  Negative for activity change  HENT: Negative  Eyes: Negative  Respiratory: Negative  Cardiovascular: Negative  Gastrointestinal: Positive for abdominal pain, constipation and diarrhea  Endocrine: Negative  Genitourinary: Negative  Musculoskeletal: Negative  Skin: Negative  Allergic/Immunologic: Negative  Neurological: Negative  Psychiatric/Behavioral: Negative for agitation, behavioral problems and confusion  The patient is not nervous/anxious  All other systems reviewed and are negative  Objective:      /90   Pulse 68   Ht 5' 7\" (1 702 m)   Wt 88 kg (194 lb)   BMI 30 38 kg/m²          Physical Exam  Constitutional:       Appearance: Normal appearance   She is " well-developed  HENT:      Head: Normocephalic and atraumatic  Nose: Nose normal    Eyes:      Extraocular Movements: Extraocular movements intact  Conjunctiva/sclera: Conjunctivae normal    Cardiovascular:      Rate and Rhythm: Normal rate and regular rhythm  Heart sounds: Normal heart sounds  Pulmonary:      Effort: Pulmonary effort is normal       Breath sounds: Normal breath sounds  Abdominal:      General: Abdomen is flat  Hernia: A hernia (, Incisional hernia above the umbilicus  This is reducible ) is present  Musculoskeletal:      Right lower leg: No edema  Left lower leg: No edema  Skin:     General: Skin is warm and dry  Neurological:      Mental Status: She is alert and oriented to person, place, and time     Psychiatric:         Mood and Affect: Mood normal

## 2023-04-25 ENCOUNTER — PREP FOR PROCEDURE (OUTPATIENT)
Dept: SURGERY | Facility: CLINIC | Age: 53
End: 2023-04-25

## 2023-04-25 DIAGNOSIS — K43.2 INCISIONAL HERNIA: Primary | ICD-10-CM

## 2023-05-06 ENCOUNTER — RA CDI HCC (OUTPATIENT)
Dept: OTHER | Facility: HOSPITAL | Age: 53
End: 2023-05-06

## 2023-05-06 NOTE — PROGRESS NOTES
[facility]  coding opportunities       Chart reviewed, no opportunity found: CHART REVIEWED, NO OPPORTUNITY FOUND        Patients Insurance        Commercial Insurance: [address] [place] [address]

## 2023-05-12 ENCOUNTER — OFFICE VISIT (OUTPATIENT)
Dept: FAMILY MEDICINE CLINIC | Facility: CLINIC | Age: 53
End: 2023-05-12

## 2023-05-12 VITALS
SYSTOLIC BLOOD PRESSURE: 112 MMHG | HEART RATE: 74 BPM | TEMPERATURE: 97.3 F | DIASTOLIC BLOOD PRESSURE: 68 MMHG | WEIGHT: 193 LBS | BODY MASS INDEX: 30.29 KG/M2 | RESPIRATION RATE: 14 BRPM | HEIGHT: 67 IN | OXYGEN SATURATION: 99 %

## 2023-05-12 DIAGNOSIS — E66.9 OBESITY (BMI 30-39.9): Primary | ICD-10-CM

## 2023-05-12 DIAGNOSIS — K21.9 LARYNGOPHARYNGEAL REFLUX (LPR): Chronic | ICD-10-CM

## 2023-05-12 DIAGNOSIS — K43.9 VENTRAL HERNIA WITHOUT OBSTRUCTION OR GANGRENE: ICD-10-CM

## 2023-05-12 NOTE — PROGRESS NOTES
Assessment/Plan:    No problem-specific Assessment & Plan notes found for this encounter  Wt improving   Continue efforts  Could f/u q5w    LPR on PPI  Suggest tx for 1-2m  Low acid diet  If uses longer than 2m then should consider EGD due to long term PPI risks  Failed pepcid in past     Diagnoses and all orders for this visit:    Obesity (BMI 30-39  9)    Laryngopharyngeal reflux (LPR)    Ventral hernia without obstruction or gangrene        Recent data suggesting increased risk of ischemic CVA and chronic kidney damage with PPI use was advised  Return in about 5 weeks (around 6/16/2023)  Subjective:      Patient ID: Marlys Edmonds is a 48 y o  female  Chief Complaint   Patient presents with   • Follow-up     Kandis Burgess CMA        HPI  Denies palpitations, chest pain, mood changes, irritability, WILHELM, edema or syncope  Use of phentermine as an adjunct to diet/exercise program aware  Accepts risks of medications as discussed  Still using peloton despite recall on seat  Walks dog 2mi    The following portions of the patient's history were reviewed and updated as appropriate: allergies, current medications, past family history, past medical history, past social history, past surgical history and problem list     Review of Systems   Cardiovascular: Negative for chest pain, palpitations and leg swelling           Current Outpatient Medications   Medication Sig Dispense Refill   • acetaminophen (TYLENOL) 325 mg tablet Take 2 tablets (650 mg total) by mouth every 6 (six) hours as needed for mild pain  0   • aspirin 81 mg chewable tablet Chew 1 tablet daily     • Calcium Carbonate-Vit D-Min (CALCIUM 1200 PO) Take by mouth     • estradiol (Estrace) 1 mg tablet Take 1 tablet (1 mg total) by mouth daily 90 tablet 0   • pantoprazole (PROTONIX) 40 mg tablet Take 1 tablet (40 mg total) by mouth daily 90 tablet 0   • phentermine (ADIPEX-P) 37 5 MG tablet Take 1 tablet (37 5 mg total) by mouth every morning (before "breakfast) 90 tablet 0     No current facility-administered medications for this visit  Objective:    /68   Pulse 74   Temp (!) 97 3 °F (36 3 °C)   Resp 14   Ht 5' 7\" (1 702 m)   Wt 87 5 kg (193 lb)   SpO2 99%   BMI 30 23 kg/m²        Physical Exam  Vitals and nursing note reviewed  Constitutional:       General: She is not in acute distress  Appearance: She is well-developed  She is not ill-appearing  HENT:      Head: Normocephalic  Right Ear: Tympanic membrane normal       Left Ear: Tympanic membrane normal    Eyes:      General: No scleral icterus  Conjunctiva/sclera: Conjunctivae normal    Cardiovascular:      Rate and Rhythm: Normal rate and regular rhythm  Heart sounds: No murmur heard  Pulmonary:      Effort: Pulmonary effort is normal  No respiratory distress  Abdominal:      Palpations: Abdomen is soft  Musculoskeletal:         General: No deformity  Cervical back: Neck supple  Right lower leg: No edema  Left lower leg: No edema  Skin:     General: Skin is warm and dry  Neurological:      Mental Status: She is alert  Psychiatric:         Mood and Affect: Mood normal          Behavior: Behavior normal          Thought Content:  Thought content normal                 Adriana Villagomez DO    "

## 2023-05-12 NOTE — H&P (VIEW-ONLY)
Assessment/Plan:    No problem-specific Assessment & Plan notes found for this encounter  Wt improving   Continue efforts  Could f/u q5w    LPR on PPI  Suggest tx for 1-2m  Low acid diet  If uses longer than 2m then should consider EGD due to long term PPI risks  Failed pepcid in past     Diagnoses and all orders for this visit:    Obesity (BMI 30-39  9)    Laryngopharyngeal reflux (LPR)    Ventral hernia without obstruction or gangrene        Recent data suggesting increased risk of ischemic CVA and chronic kidney damage with PPI use was advised  Return in about 5 weeks (around 6/16/2023)  Subjective:      Patient ID: Prabhjot Verdugo is a 48 y o  female  Chief Complaint   Patient presents with   • Follow-up     Rigoberto Mcconnell CMA        HPI  Denies palpitations, chest pain, mood changes, irritability, WILHELM, edema or syncope  Use of phentermine as an adjunct to diet/exercise program aware  Accepts risks of medications as discussed  Still using peloton despite recall on seat  Walks dog 2mi    The following portions of the patient's history were reviewed and updated as appropriate: allergies, current medications, past family history, past medical history, past social history, past surgical history and problem list     Review of Systems   Cardiovascular: Negative for chest pain, palpitations and leg swelling           Current Outpatient Medications   Medication Sig Dispense Refill   • acetaminophen (TYLENOL) 325 mg tablet Take 2 tablets (650 mg total) by mouth every 6 (six) hours as needed for mild pain  0   • aspirin 81 mg chewable tablet Chew 1 tablet daily     • Calcium Carbonate-Vit D-Min (CALCIUM 1200 PO) Take by mouth     • estradiol (Estrace) 1 mg tablet Take 1 tablet (1 mg total) by mouth daily 90 tablet 0   • pantoprazole (PROTONIX) 40 mg tablet Take 1 tablet (40 mg total) by mouth daily 90 tablet 0   • phentermine (ADIPEX-P) 37 5 MG tablet Take 1 tablet (37 5 mg total) by mouth every morning (before "breakfast) 90 tablet 0     No current facility-administered medications for this visit  Objective:    /68   Pulse 74   Temp (!) 97 3 °F (36 3 °C)   Resp 14   Ht 5' 7\" (1 702 m)   Wt 87 5 kg (193 lb)   SpO2 99%   BMI 30 23 kg/m²        Physical Exam  Vitals and nursing note reviewed  Constitutional:       General: She is not in acute distress  Appearance: She is well-developed  She is not ill-appearing  HENT:      Head: Normocephalic  Right Ear: Tympanic membrane normal       Left Ear: Tympanic membrane normal    Eyes:      General: No scleral icterus  Conjunctiva/sclera: Conjunctivae normal    Cardiovascular:      Rate and Rhythm: Normal rate and regular rhythm  Heart sounds: No murmur heard  Pulmonary:      Effort: Pulmonary effort is normal  No respiratory distress  Abdominal:      Palpations: Abdomen is soft  Musculoskeletal:         General: No deformity  Cervical back: Neck supple  Right lower leg: No edema  Left lower leg: No edema  Skin:     General: Skin is warm and dry  Neurological:      Mental Status: She is alert  Psychiatric:         Mood and Affect: Mood normal          Behavior: Behavior normal          Thought Content:  Thought content normal                 Verónica Loredo DO    "

## 2023-05-18 NOTE — PRE-PROCEDURE INSTRUCTIONS
Pre-Surgery Instructions:   Medication Instructions   • acetaminophen (TYLENOL) 325 mg tablet Uses PRN- OK to take day of surgery   • aspirin 81 mg chewable tablet Stop taking 7 days prior to surgery  • Calcium Carbonate-Vit D-Min (CALCIUM 1200 PO) Instructions provided by MD   • estradiol (Estrace) 1 mg tablet Hold day of surgery  • pantoprazole (PROTONIX) 40 mg tablet Take day of surgery  • phentermine (ADIPEX-P) 37 5 MG tablet Stop taking 5 days prior to surgery  Medication instructions for day surgery reviewed  Please use only a sip of water to take your instructed medications  Avoid all over the counter vitamins, supplements and NSAIDS for one week prior to surgery per anesthesia guidelines  Tylenol is ok to take as needed  You will receive a call one business day prior to surgery with an arrival time and hospital directions  If your surgery is scheduled on a Monday, the hospital will be calling you on the Friday prior to your surgery  If you have not heard from anyone by 8pm, please call the hospital supervisor through the hospital  at [phone]  Yasmine Files [phone])  Do not eat or drink anything after midnight the night before your surgery, including candy, mints, lifesavers, or chewing gum  Do not drink alcohol 24hrs before your surgery  Try not to smoke at least 24hrs before your surgery  Follow the pre surgery showering instructions as listed in the [facility] Surgical Experience Booklet” or otherwise provided by your surgeon's office  Do not shave the surgical area 24 hours before surgery  Do not apply any lotions, creams, including makeup, cologne, deodorant, or perfumes after showering on the day of your surgery  No contact lenses, eye make-up, or artificial eyelashes  Remove nail polish, including gel polish, and any artificial, gel, or acrylic nails if possible  Remove all jewelry including rings and body piercing jewelry       Wear causal clothing that is easy to take on and off  Consider your type of surgery  Keep any valuables, jewelry, piercings at home  Please bring any specially ordered equipment (sling, braces) if indicated  Arrange for a responsible person to drive you to and from the hospital on the day of your surgery  Visitor Guidelines discussed  Call the surgeon's office with any new illnesses, exposures, or additional questions prior to surgery  Please reference your [facility] Surgical Experience Booklet” for additional information to prepare for your upcoming surgery

## 2023-05-23 NOTE — DISCHARGE INSTR - AVS FIRST PAGE
Please call the office when you leave to schedule an appointment for 2 weeks  Please call [phone]                      [address], [place], [address]  Off of [address] between [facility] and [facility]  Activity:    May lift 10 lb as many times as desired the 1st week,       20 lb in 2 weeks,       30 lb in 3 weeks  Walking is encouraged  Normal daily activities including climbing steps are okay  Do not engage in strenuous activity ( sit-ups or crutches) or contact sports for 4-6 weeks post-operatively    Return to Work:   Okay to return to work when you feel well if you desire  Diet:   You may return to your normal healthy diet  Wound Care: Your wound is closed with dissolvable stitches and glue  It is okay to shower  Wash incision gently with soap and water and pat dry  Do not soak incisions in bath water or swim for two weeks  Do not apply any creams or ointments  Pain Medication:   Please take as directed if needed  May use Advil or Motrin in addition  Recall, the pain medicine and anesthesia is associated with constipation  No driving while taking narcotic pain medications  Other: It is normal to developed a “healing ridge” / firm incision after surgery  This is your body making scar tissue  It is a good sign  Constipation is very common after general anesthesia  Please use milk of magnesia as needed in order to help prevent constipation  It is normal to get bruising after surgery  If you have questions after discharge please call the office      If you have increased pain, fever >101 5, increased drainage, redness or a bad smell at your surgery site, please call us immediately or come directly to the Emergency Room

## 2023-05-24 ENCOUNTER — ANESTHESIA EVENT (OUTPATIENT)
Dept: PERIOP | Facility: HOSPITAL | Age: 53
End: 2023-05-24

## 2023-05-25 ENCOUNTER — HOSPITAL ENCOUNTER (OUTPATIENT)
Facility: HOSPITAL | Age: 53
Setting detail: OUTPATIENT SURGERY
Discharge: HOME | End: 2023-05-25
Attending: SURGERY | Admitting: SURGERY

## 2023-05-25 ENCOUNTER — ANESTHESIA (OUTPATIENT)
Dept: PERIOP | Facility: HOSPITAL | Age: 53
End: 2023-05-25

## 2023-05-25 VITALS
OXYGEN SATURATION: 99 % | HEART RATE: 52 BPM | SYSTOLIC BLOOD PRESSURE: 159 MMHG | DIASTOLIC BLOOD PRESSURE: 71 MMHG | TEMPERATURE: 97.1 F | RESPIRATION RATE: 18 BRPM

## 2023-05-25 DIAGNOSIS — K43.9 VENTRAL HERNIA WITHOUT OBSTRUCTION OR GANGRENE: Primary | ICD-10-CM

## 2023-05-25 DEVICE — VENTRALEX ST HERNIA PATCH
Type: IMPLANTABLE DEVICE | Site: ABDOMEN | Status: FUNCTIONAL
Brand: VENTRALEX ST HERNIA PATCH

## 2023-05-25 RX ORDER — HYDROMORPHONE HCL/PF 1 MG/ML
0.5 SYRINGE (ML) INJECTION
Status: DISCONTINUED | OUTPATIENT
Start: 2023-05-25 | End: 2023-05-25 | Stop reason: HOSPADM

## 2023-05-25 RX ORDER — HYDROMORPHONE HCL IN WATER/PF 6 MG/30 ML
0.2 PATIENT CONTROLLED ANALGESIA SYRINGE INTRAVENOUS
Status: DISCONTINUED | OUTPATIENT
Start: 2023-05-25 | End: 2023-05-25 | Stop reason: HOSPADM

## 2023-05-25 RX ORDER — HYDROMORPHONE HCL/PF 1 MG/ML
SYRINGE (ML) INJECTION AS NEEDED
Status: DISCONTINUED | OUTPATIENT
Start: 2023-05-25 | End: 2023-05-25

## 2023-05-25 RX ORDER — ONDANSETRON 2 MG/ML
4 INJECTION INTRAMUSCULAR; INTRAVENOUS ONCE AS NEEDED
Status: DISCONTINUED | OUTPATIENT
Start: 2023-05-25 | End: 2023-05-25 | Stop reason: HOSPADM

## 2023-05-25 RX ORDER — SODIUM CHLORIDE, SODIUM LACTATE, POTASSIUM CHLORIDE, CALCIUM CHLORIDE 600; 310; 30; 20 MG/100ML; MG/100ML; MG/100ML; MG/100ML
INJECTION, SOLUTION INTRAVENOUS CONTINUOUS PRN
Status: DISCONTINUED | OUTPATIENT
Start: 2023-05-25 | End: 2023-05-25

## 2023-05-25 RX ORDER — SODIUM CHLORIDE, SODIUM LACTATE, POTASSIUM CHLORIDE, CALCIUM CHLORIDE 600; 310; 30; 20 MG/100ML; MG/100ML; MG/100ML; MG/100ML
50 INJECTION, SOLUTION INTRAVENOUS CONTINUOUS
Status: DISCONTINUED | OUTPATIENT
Start: 2023-05-25 | End: 2023-05-25 | Stop reason: HOSPADM

## 2023-05-25 RX ORDER — FENTANYL CITRATE/PF 50 MCG/ML
50 SYRINGE (ML) INJECTION
Status: DISCONTINUED | OUTPATIENT
Start: 2023-05-25 | End: 2023-05-25 | Stop reason: HOSPADM

## 2023-05-25 RX ORDER — PROPOFOL 10 MG/ML
INJECTION, EMULSION INTRAVENOUS AS NEEDED
Status: DISCONTINUED | OUTPATIENT
Start: 2023-05-25 | End: 2023-05-25

## 2023-05-25 RX ORDER — MIDAZOLAM HYDROCHLORIDE 2 MG/2ML
INJECTION, SOLUTION INTRAMUSCULAR; INTRAVENOUS AS NEEDED
Status: DISCONTINUED | OUTPATIENT
Start: 2023-05-25 | End: 2023-05-25

## 2023-05-25 RX ORDER — MAGNESIUM HYDROXIDE 1200 MG/15ML
LIQUID ORAL AS NEEDED
Status: DISCONTINUED | OUTPATIENT
Start: 2023-05-25 | End: 2023-05-25 | Stop reason: HOSPADM

## 2023-05-25 RX ORDER — ONDANSETRON 2 MG/ML
4 INJECTION INTRAMUSCULAR; INTRAVENOUS EVERY 4 HOURS PRN
Status: DISCONTINUED | OUTPATIENT
Start: 2023-05-25 | End: 2023-05-25 | Stop reason: HOSPADM

## 2023-05-25 RX ORDER — BUPIVACAINE HYDROCHLORIDE AND EPINEPHRINE 2.5; 5 MG/ML; UG/ML
INJECTION, SOLUTION INFILTRATION; PERINEURAL AS NEEDED
Status: DISCONTINUED | OUTPATIENT
Start: 2023-05-25 | End: 2023-05-25 | Stop reason: HOSPADM

## 2023-05-25 RX ORDER — DEXAMETHASONE SODIUM PHOSPHATE 10 MG/ML
INJECTION, SOLUTION INTRAMUSCULAR; INTRAVENOUS AS NEEDED
Status: DISCONTINUED | OUTPATIENT
Start: 2023-05-25 | End: 2023-05-25

## 2023-05-25 RX ORDER — CEFAZOLIN SODIUM 2 G/50ML
2000 SOLUTION INTRAVENOUS ONCE
Status: COMPLETED | OUTPATIENT
Start: 2023-05-25 | End: 2023-05-25

## 2023-05-25 RX ORDER — ACETAMINOPHEN 325 MG/1
650 TABLET ORAL EVERY 4 HOURS PRN
Status: DISCONTINUED | OUTPATIENT
Start: 2023-05-25 | End: 2023-05-25 | Stop reason: HOSPADM

## 2023-05-25 RX ORDER — ONDANSETRON 2 MG/ML
INJECTION INTRAMUSCULAR; INTRAVENOUS AS NEEDED
Status: DISCONTINUED | OUTPATIENT
Start: 2023-05-25 | End: 2023-05-25

## 2023-05-25 RX ORDER — KETOROLAC TROMETHAMINE 30 MG/ML
INJECTION, SOLUTION INTRAMUSCULAR; INTRAVENOUS AS NEEDED
Status: DISCONTINUED | OUTPATIENT
Start: 2023-05-25 | End: 2023-05-25

## 2023-05-25 RX ORDER — PROPOFOL 10 MG/ML
INJECTION, EMULSION INTRAVENOUS CONTINUOUS PRN
Status: DISCONTINUED | OUTPATIENT
Start: 2023-05-25 | End: 2023-05-25

## 2023-05-25 RX ORDER — FENTANYL CITRATE 50 UG/ML
INJECTION, SOLUTION INTRAMUSCULAR; INTRAVENOUS AS NEEDED
Status: DISCONTINUED | OUTPATIENT
Start: 2023-05-25 | End: 2023-05-25

## 2023-05-25 RX ORDER — OXYCODONE HYDROCHLORIDE AND ACETAMINOPHEN 5; 325 MG/1; MG/1
1 TABLET ORAL EVERY 4 HOURS PRN
Status: DISCONTINUED | OUTPATIENT
Start: 2023-05-25 | End: 2023-05-25 | Stop reason: HOSPADM

## 2023-05-25 RX ORDER — OXYCODONE HYDROCHLORIDE AND ACETAMINOPHEN 5; 325 MG/1; MG/1
1 TABLET ORAL EVERY 4 HOURS PRN
Qty: 10 TABLET | Refills: 0 | Status: SHIPPED | OUTPATIENT
Start: 2023-05-25

## 2023-05-25 RX ADMIN — ONDANSETRON 4 MG: 2 INJECTION INTRAMUSCULAR; INTRAVENOUS at 10:39

## 2023-05-25 RX ADMIN — HYDROMORPHONE HYDROCHLORIDE 0.5 MG: 1 INJECTION, SOLUTION INTRAMUSCULAR; INTRAVENOUS; SUBCUTANEOUS at 11:06

## 2023-05-25 RX ADMIN — FENTANYL CITRATE 25 MCG: 50 INJECTION, SOLUTION INTRAMUSCULAR; INTRAVENOUS at 10:49

## 2023-05-25 RX ADMIN — DEXAMETHASONE SODIUM PHOSPHATE 5 MG: 10 INJECTION, SOLUTION INTRAMUSCULAR; INTRAVENOUS at 10:47

## 2023-05-25 RX ADMIN — FENTANYL CITRATE 25 MCG: 50 INJECTION, SOLUTION INTRAMUSCULAR; INTRAVENOUS at 11:03

## 2023-05-25 RX ADMIN — CEFAZOLIN SODIUM 2000 MG: 2 SOLUTION INTRAVENOUS at 10:39

## 2023-05-25 RX ADMIN — SODIUM CHLORIDE, SODIUM LACTATE, POTASSIUM CHLORIDE, AND CALCIUM CHLORIDE: .6; .31; .03; .02 INJECTION, SOLUTION INTRAVENOUS at 10:27

## 2023-05-25 RX ADMIN — FENTANYL CITRATE 50 MCG: 50 INJECTION INTRAMUSCULAR; INTRAVENOUS at 11:44

## 2023-05-25 RX ADMIN — FENTANYL CITRATE 50 MCG: 50 INJECTION INTRAMUSCULAR; INTRAVENOUS at 11:34

## 2023-05-25 RX ADMIN — FENTANYL CITRATE 25 MCG: 50 INJECTION, SOLUTION INTRAMUSCULAR; INTRAVENOUS at 10:39

## 2023-05-25 RX ADMIN — FENTANYL CITRATE 25 MCG: 50 INJECTION, SOLUTION INTRAMUSCULAR; INTRAVENOUS at 10:54

## 2023-05-25 RX ADMIN — KETOROLAC TROMETHAMINE 15 MG: 30 INJECTION, SOLUTION INTRAMUSCULAR at 10:55

## 2023-05-25 RX ADMIN — PROPOFOL 170 MG: 10 INJECTION, EMULSION INTRAVENOUS at 10:39

## 2023-05-25 RX ADMIN — PROPOFOL 50 MCG/KG/MIN: 10 INJECTION, EMULSION INTRAVENOUS at 10:42

## 2023-05-25 RX ADMIN — MIDAZOLAM HYDROCHLORIDE 2 MG: 1 INJECTION, SOLUTION INTRAMUSCULAR; INTRAVENOUS at 10:34

## 2023-05-25 NOTE — OP NOTE
OPERATIVE REPORT  PATIENT NAME: Shirley Peoples    :  1970  MRN: [id number]  Pt Location: AN OR ROOM 01    SURGERY DATE: 2023    Surgeon(s) and Role:     * Jonah Uribe MD - Primary     * Eduar Rangel MD - Assisting    Preop Diagnosis:  Incisional hernia [K43 2]    Post-Op Diagnosis Codes:     * Incisional hernia [K43 2]    Procedure(s):  REPAIR HERNIA INCISIONAL with mesh    Specimen(s):  * No specimens in log *    Estimated Blood Loss:   Minimal    Drains:  * No LDAs found *    Anesthesia Type:   General    Operative Indications:  Incisional hernia [K43 2]    Independent, non-smoker, ASA 2, wound class I, BMI 30, weight 190, height 67  Holosystolic murmur    (+) 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 [facility])                       Complications:   None    Procedure and Technique:  Patient was identified visually and by armband  Placed in supine position  After general anesthesia the abdomen was prepped and draped in a sterile fashion  1/4% Marcaine with epinephrine was utilized throughout the procedure  Incision was made carried down through skin subtenons tissue  Hernia sac was dissected free and retracted into the abdomen  The hernia size was 4 cm in diameter  Polypropylene mesh was then inserted  The hernia was repaired with interrupted figure-of-eight #1 Vicryl suture  This followed by 3-0 Vicryl subcutaneous and 4 Monocryl sutures  Dermabond was applied  The patient tolerated this procedure well  Sponge and instrument count correct x2  I was present for the entire procedure      Patient Disposition:  PACU         SIGNATURE: Jonah Uribe MD  DATE: May 25, 2023  TIME: 11:32 AM

## 2023-05-25 NOTE — ANESTHESIA PREPROCEDURE EVALUATION
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 [facility])        Physical Exam    Airway    Mallampati score: II  TM Distance: >3 FB  Neck ROM: full     Dental   No notable dental hx     Cardiovascular  Rhythm: regular, Rate: normal, Cardiovascular exam normal    Pulmonary  Pulmonary exam normal Breath sounds clear to auscultation,     Other Findings        Anesthesia Plan  ASA Score- 3     Anesthesia Type- general with ASA Monitors  Additional Monitors:   Airway Plan: LMA  Comment: Risks/benefits and alternatives discussed with patient including likely possibility of PONV and sore throat, as well as the rare possibilities of aspiration, dental/oropharyngeal/ocular injuries, or grave/life threatening anesthetic and surgical emergencies          Plan Factors-Exercise tolerance (METS): >4 METS  Patient summary reviewed  Patient instructed to abstain from smoking on day of procedure  Patient did not smoke on day of surgery  Induction- intravenous  Postoperative Plan- Plan for postoperative opioid use  Planned trial extubation    Informed Consent- Anesthetic plan and risks discussed with patient  I personally reviewed this patient with the CRNA  Discussed and agreed on the Anesthesia Plan with the CRNA  Melanie Ceja

## 2023-05-25 NOTE — INTERVAL H&P NOTE
H&P reviewed  After examining the patient I find no changes in the patients condition since the H&P had been written  Incisional hernia for repair  Risks discussed  There were no vitals filed for this visit

## 2023-05-25 NOTE — ANESTHESIA POSTPROCEDURE EVALUATION
Post-Op Assessment Note    CV Status:  Stable    Pain management: adequate     Mental Status:  Lethargic and sleepy   Hydration Status:  Stable   PONV Controlled:  None   Airway Patency:  Patent      Post Op Vitals Reviewed: Yes      Staff: CRNA         No notable events documented      BP   113/57   Temp      Pulse  82   Resp      SpO2   99

## 2023-06-10 PROBLEM — R73.03 PREDIABETES: Status: ACTIVE | Noted: 2023-06-10

## 2023-06-12 ENCOUNTER — OFFICE VISIT (OUTPATIENT)
Dept: SURGERY | Facility: CLINIC | Age: 53
End: 2023-06-12
Payer: COMMERCIAL

## 2023-06-12 ENCOUNTER — OFFICE VISIT (OUTPATIENT)
Dept: FAMILY MEDICINE CLINIC | Facility: CLINIC | Age: 53
End: 2023-06-12
Payer: COMMERCIAL

## 2023-06-12 VITALS
RESPIRATION RATE: 18 BRPM | HEIGHT: 67 IN | DIASTOLIC BLOOD PRESSURE: 80 MMHG | WEIGHT: 188 LBS | SYSTOLIC BLOOD PRESSURE: 126 MMHG | HEART RATE: 72 BPM | BODY MASS INDEX: 29.51 KG/M2 | TEMPERATURE: 98.2 F

## 2023-06-12 DIAGNOSIS — L57.0 ACTINIC KERATOSES: ICD-10-CM

## 2023-06-12 DIAGNOSIS — R73.03 PREDIABETES: ICD-10-CM

## 2023-06-12 DIAGNOSIS — K43.9 VENTRAL HERNIA WITHOUT OBSTRUCTION OR GANGRENE: Primary | ICD-10-CM

## 2023-06-12 DIAGNOSIS — E66.9 OBESITY (BMI 30-39.9): Primary | ICD-10-CM

## 2023-06-12 DIAGNOSIS — K21.9 LARYNGOPHARYNGEAL REFLUX (LPR): Chronic | ICD-10-CM

## 2023-06-12 PROCEDURE — 99212 OFFICE O/P EST SF 10 MIN: CPT | Performed by: SURGERY

## 2023-06-12 PROCEDURE — 17000 DESTRUCT PREMALG LESION: CPT | Performed by: FAMILY MEDICINE

## 2023-06-12 PROCEDURE — 99910: CPT | Performed by: SURGERY

## 2023-06-12 PROCEDURE — 99214 OFFICE O/P EST MOD 30 MIN: CPT | Performed by: FAMILY MEDICINE

## 2023-06-12 RX ORDER — PHENTERMINE HYDROCHLORIDE 37.5 MG/1
37.5 TABLET ORAL EVERY MORNING
Qty: 90 TABLET | Refills: 0 | Status: SHIPPED | OUTPATIENT
Start: 2023-06-12

## 2023-06-12 NOTE — PROGRESS NOTES
Assessment/Plan:    No problem-specific Assessment & Plan notes found for this encounter  bmi noted  Lost 11# from start  Continue same and diet/exercise measures  Will shoot for 160-170 as goal    LPR stable    Prediabetes  Low carb diet  F/u in future after wt loss    AK lower lip  Can do shave bx if recurs in future  Tolerated cryo 3 phase  Last derm was in [facility]     Diagnoses and all orders for this visit:    Obesity (BMI 30-39 9)  -     phentermine (ADIPEX-P) 37 5 MG tablet; Take 1 tablet (37 5 mg total) by mouth every morning (before breakfast)    Laryngopharyngeal reflux (LPR)    Prediabetes    Actinic keratoses  -     Lesion Destruction       Return in about 5 weeks (around 7/17/2023)  Subjective:      Patient ID: Didier Robison is a 48 y o  female  Chief Complaint   Patient presents with   • Weight Check     Lw cma   • Medication Management     Phentermine  lw cma       HPI  Was off phentermine for 1w for surgery  Dr Leo Obando healthy  Walking for exercise  Gradually increasing activity levels    Denies palpitations, chest pain, mood changes, irritability, WILHELM, edema or syncope  Use of phentermine as an adjunct to diet/exercise program aware  Accepts risks of medications as discussed  The following portions of the patient's history were reviewed and updated as appropriate: allergies, current medications, past family history, past medical history, past social history, past surgical history and problem list     Review of Systems   Respiratory: Negative for shortness of breath  Cardiovascular: Negative for chest pain and leg swelling           Current Outpatient Medications   Medication Sig Dispense Refill   • acetaminophen (TYLENOL) 325 mg tablet Take 2 tablets (650 mg total) by mouth every 6 (six) hours as needed for mild pain  0   • aspirin 81 mg chewable tablet Chew 1 tablet daily     • Calcium Carbonate-Vit D-Min (CALCIUM 1200 PO) Take by mouth     • estradiol (Estrace) 1 mg tablet Take "1 tablet (1 mg total) by mouth daily 90 tablet 0   • pantoprazole (PROTONIX) 40 mg tablet Take 1 tablet (40 mg total) by mouth daily 90 tablet 0   • phentermine (ADIPEX-P) 37 5 MG tablet Take 1 tablet (37 5 mg total) by mouth every morning (before breakfast) 90 tablet 0     No current facility-administered medications for this visit  Objective:    /80   Pulse 72   Temp 98 2 °F (36 8 °C) (Tympanic)   Resp 18   Ht 5' 7\" (1 702 m)   Wt 85 3 kg (188 lb)   BMI 29 44 kg/m²        Physical Exam  Vitals and nursing note reviewed  Constitutional:       General: She is not in acute distress  Appearance: She is well-developed  She is not ill-appearing  HENT:      Head: Normocephalic  Right Ear: Tympanic membrane normal       Left Ear: Tympanic membrane normal    Eyes:      General: No scleral icterus  Conjunctiva/sclera: Conjunctivae normal    Cardiovascular:      Rate and Rhythm: Normal rate and regular rhythm  Heart sounds: No murmur heard  Pulmonary:      Effort: Pulmonary effort is normal  No respiratory distress  Breath sounds: No wheezing  Abdominal:      Palpations: Abdomen is soft  Musculoskeletal:         General: No deformity  Cervical back: Neck supple  Right lower leg: No edema  Left lower leg: No edema  Skin:     General: Skin is warm and dry  Coloration: Skin is not pale  Findings: Lesion present  Comments: ak lower lip   Neurological:      Mental Status: She is alert  Motor: No weakness  Gait: Gait normal    Psychiatric:         Mood and Affect: Mood normal          Behavior: Behavior normal          Thought Content: Thought content normal        Lesion Destruction    Date/Time: 6/12/2023 3:30 PM    Performed by: Glen Pollock DO  Authorized by: Glen Pollock DO  Universal Protocol:  Consent: Verbal consent obtained  Written consent not obtained    Risks and benefits: risks, benefits and alternatives were " discussed  Consent given by: patient      Procedure Details - Lesion Destruction:     Number of Lesions:  1  Lesion 1:     Body area:  Head/neck    Head/neck location:  Lower lip    Initial size (mm):  4    Final defect size (mm):  4    Malignancy: pre-malignant lesion      Destruction method: cryotherapy       Patient tolerated procedure well without any immediate post-procedural complications  Given instructions for care for procedural site                   Isabelle Dick DO

## 2023-06-12 NOTE — PROGRESS NOTES
Assessment/Plan: Patient returns post ventral hernia repair  She feels well  She offers no complaints  Incisions clean and healing well  All questions were answered  There are no diagnoses linked to this encounter  Subjective:      Patient ID: Raul Ospina is a 48 y o  female  Patient presents post operatively  Incisional hernia repair 5/25/2023  The following portions of the patient's history were reviewed and updated as appropriate:     She  has a past medical history of Anemia (02/2022), Colon polyp, Hemorrhoids, History of transfusion (02/2022), and Irritable bowel syndrome  She  has a past surgical history that includes Hysterectomy; Oophorectomy; Tubal ligation; Appendectomy; Hysteroscopy w/ endometrial ablation; LAPAROTOMY (N/A, 02/08/2022); Colonoscopy; Bowel resection (02/08/2022); and pr rpr aa hernia 1st < 3 cm reducible (N/A, 5/25/2023)  Her family history includes Basal cell carcinoma in her brother and sister; Breast cancer in her mother and paternal grandmother; Breast cancer (age of onset: 80) in her maternal aunt; Cancer in her father and mother; Colon cancer in her maternal aunt; Salima's disease in her family; Motor neuron disease in her mother  She  reports that she quit smoking about 20 years ago  Her smoking use included cigarettes  She started smoking about 30 years ago  She has a 5 00 pack-year smoking history  She has never been exposed to tobacco smoke  She has never used smokeless tobacco  She reports current alcohol use  She reports that she does not use drugs    Current Outpatient Medications   Medication Sig Dispense Refill   • acetaminophen (TYLENOL) 325 mg tablet Take 2 tablets (650 mg total) by mouth every 6 (six) hours as needed for mild pain  0   • aspirin 81 mg chewable tablet Chew 1 tablet daily     • Calcium Carbonate-Vit D-Min (CALCIUM 1200 PO) Take by mouth     • estradiol (Estrace) 1 mg tablet Take 1 tablet (1 mg total) by mouth daily 90 tablet 0   • oxyCODONE-acetaminophen (PERCOCET) 5-325 mg per tablet Take 1 tablet by mouth every 4 (four) hours as needed for moderate pain for up to 10 doses Max Daily Amount: 6 tablets 10 tablet 0   • pantoprazole (PROTONIX) 40 mg tablet Take 1 tablet (40 mg total) by mouth daily 90 tablet 0   • phentermine (ADIPEX-P) 37 5 MG tablet Take 1 tablet (37 5 mg total) by mouth every morning (before breakfast) 90 tablet 0     No current facility-administered medications for this visit  She has No Known Allergies       Review of Systems   Constitutional: Negative  Negative for activity change  HENT: Negative  Eyes: Negative  Respiratory: Negative  Cardiovascular: Negative  Gastrointestinal: Positive for abdominal pain, constipation and diarrhea  Endocrine: Negative  Genitourinary: Negative  Musculoskeletal: Negative  Skin: Negative  Allergic/Immunologic: Negative  Neurological: Negative  Psychiatric/Behavioral: Negative for agitation, behavioral problems and confusion  The patient is not nervous/anxious  All other systems reviewed and are negative  Objective: There were no vitals taken for this visit  Physical Exam  Constitutional:       Appearance: Normal appearance  She is well-developed  HENT:      Head: Normocephalic and atraumatic  Nose: Nose normal    Eyes:      Conjunctiva/sclera: Conjunctivae normal    Pulmonary:      Effort: Pulmonary effort is normal    Abdominal:      General: Abdomen is flat  Musculoskeletal:      Right lower leg: No edema  Left lower leg: No edema  Skin:     General: Skin is warm and dry  Comments: Incision is clean healing well  Neurological:      Mental Status: She is alert and oriented to person, place, and time     Psychiatric:         Mood and Affect: Mood normal

## 2023-06-23 DIAGNOSIS — N95.1 SYMPTOMATIC MENOPAUSAL OR FEMALE CLIMACTERIC STATES: ICD-10-CM

## 2023-06-27 RX ORDER — ESTRADIOL 1 MG/1
1 TABLET ORAL DAILY
Qty: 90 TABLET | Refills: 0 | Status: SHIPPED | OUTPATIENT
Start: 2023-06-27

## 2023-06-29 ENCOUNTER — HOSPITAL ENCOUNTER (OUTPATIENT)
Dept: RADIOLOGY | Facility: HOSPITAL | Age: 53
Discharge: HOME | End: 2023-06-29
Attending: FAMILY MEDICINE
Payer: COMMERCIAL

## 2023-06-29 VITALS — BODY MASS INDEX: 28.72 KG/M2 | WEIGHT: 183 LBS | HEIGHT: 67 IN

## 2023-06-29 DIAGNOSIS — Z12.31 BREAST CANCER SCREENING BY MAMMOGRAM: ICD-10-CM

## 2023-06-29 PROCEDURE — 77063 BREAST TOMOSYNTHESIS BI: CPT

## 2023-06-29 PROCEDURE — 77067 SCR MAMMO BI INCL CAD: CPT

## 2023-07-13 DIAGNOSIS — Z91.89 AT HIGH RISK FOR BREAST CANCER: Primary | ICD-10-CM

## 2023-07-19 ENCOUNTER — OFFICE VISIT (OUTPATIENT)
Dept: FAMILY MEDICINE CLINIC | Facility: CLINIC | Age: 53
End: 2023-07-19
Payer: COMMERCIAL

## 2023-07-19 VITALS
HEIGHT: 67 IN | BODY MASS INDEX: 29.13 KG/M2 | HEART RATE: 72 BPM | DIASTOLIC BLOOD PRESSURE: 60 MMHG | SYSTOLIC BLOOD PRESSURE: 130 MMHG | TEMPERATURE: 96.5 F | RESPIRATION RATE: 18 BRPM | WEIGHT: 185.6 LBS

## 2023-07-19 DIAGNOSIS — L57.0 ACTINIC KERATOSES: ICD-10-CM

## 2023-07-19 DIAGNOSIS — E66.3 OVERWEIGHT (BMI 25.0-29.9): Primary | ICD-10-CM

## 2023-07-19 DIAGNOSIS — Z91.89 INCREASED RISK OF BREAST CANCER: ICD-10-CM

## 2023-07-19 PROBLEM — E66.9 OBESITY (BMI 30-39.9): Status: RESOLVED | Noted: 2023-04-12 | Resolved: 2023-07-19

## 2023-07-19 PROCEDURE — 17000 DESTRUCT PREMALG LESION: CPT | Performed by: FAMILY MEDICINE

## 2023-07-19 PROCEDURE — 99214 OFFICE O/P EST MOD 30 MIN: CPT | Performed by: FAMILY MEDICINE

## 2023-07-19 NOTE — PROGRESS NOTES
Assessment/Plan:    No problem-specific Assessment & Plan notes found for this encounter. AK lip, cryo done, suggest bx if recurs after tx    bmi discussed, continue wt loss plan and exercise, reconsider phentermine use if wt stabilizes    MRI breast ordered  Saw gyn who is suggesting gene testing  Prior mammo and TC score discussed     Diagnoses and all orders for this visit:    Overweight (BMI 25.0-29. 9)    Actinic keratoses    Increased risk of breast cancer  Comments:  by T-C score    Other orders  -     Lesion Destruction        Return in about 5 weeks (around 8/23/2023) for Recheck. Subjective:      Patient ID: Ana Laura Wilson is a 48 y.o. female. Chief Complaint   Patient presents with   • Follow-up     [id number]       HPI  MRI breast planned due to elevated T-C score  Wt about same but losing inches  Clothes looser  Feeling better  More energy  Exercises on peloton  Watching calories  Lost 3# in 5w    The following portions of the patient's history were reviewed and updated as appropriate: allergies, current medications, past family history, past medical history, past social history, past surgical history and problem list.    Review of Systems   Respiratory: Negative for shortness of breath. Cardiovascular: Negative for chest pain, palpitations and leg swelling.          Current Outpatient Medications   Medication Sig Dispense Refill   • acetaminophen (TYLENOL) 325 mg tablet Take 2 tablets (650 mg total) by mouth every 6 (six) hours as needed for mild pain (Patient taking differently: Take 650 mg by mouth every 6 (six) hours as needed for mild pain Per patient taking when needed)  0   • aspirin 81 mg chewable tablet Chew 1 tablet daily     • Calcium Carbonate-Vit D-Min (CALCIUM 1200 PO) Take by mouth     • estradiol (Estrace) 1 mg tablet Take 1 tablet (1 mg total) by mouth daily 90 tablet 0   • pantoprazole (PROTONIX) 40 mg tablet Take 1 tablet (40 mg total) by mouth daily 90 tablet 0   • phentermine (ADIPEX-P) 37.5 MG tablet Take 1 tablet (37.5 mg total) by mouth every morning (before breakfast) 90 tablet 0     No current facility-administered medications for this visit. Objective:    /60   Pulse 72   Temp (!) 96.5 °F (35.8 °C)   Resp 18   Ht 5' 7" (1.702 m)   Wt 84.2 kg (185 lb 9.6 oz)   BMI 29.07 kg/m²        Physical Exam  Vitals and nursing note reviewed. Constitutional:       General: She is not in acute distress. Appearance: She is well-developed. She is not ill-appearing. HENT:      Head: Normocephalic. Right Ear: Tympanic membrane normal.      Left Ear: Tympanic membrane normal.   Eyes:      General: No scleral icterus. Conjunctiva/sclera: Conjunctivae normal.   Cardiovascular:      Rate and Rhythm: Normal rate and regular rhythm. Heart sounds: No murmur heard. Pulmonary:      Effort: Pulmonary effort is normal. No respiratory distress. Breath sounds: No rales. Abdominal:      Palpations: Abdomen is soft. Musculoskeletal:         General: No deformity. Cervical back: Neck supple. Right lower leg: No edema. Left lower leg: No edema. Skin:     General: Skin is warm and dry. Coloration: Skin is not pale. Comments: ak mid lower lip   Neurological:      Mental Status: She is alert. Motor: No weakness. Gait: Gait normal.   Psychiatric:         Mood and Affect: Mood normal.         Behavior: Behavior normal.         Thought Content: Thought content normal.         Lesion Destruction    Date/Time: 7/19/2023 9:00 AM    Performed by: Tariq Villeda DO  Authorized by: Tariq Villeda DO  Universal Protocol:  Consent: Verbal consent obtained. Written consent not obtained.   Risks and benefits: risks, benefits and alternatives were discussed  Consent given by: patient      Procedure Details - Lesion Destruction:     Number of Lesions:  1  Lesion 1:     Body area:  Head/neck    Head/neck location:  Lower lip    Initial size (mm): 3    Final defect size (mm):  3    Malignancy: pre-malignant lesion      Destruction method: cryotherapy       Patient tolerated procedure well without any immediate post-procedural complications. Given instructions for care for procedural site.                Aliza Pérez DO

## 2023-07-31 ENCOUNTER — HOSPITAL ENCOUNTER (OUTPATIENT)
Dept: RADIOLOGY | Facility: HOSPITAL | Age: 53
Discharge: HOME | End: 2023-07-31
Attending: FAMILY MEDICINE
Payer: COMMERCIAL

## 2023-07-31 VITALS — HEIGHT: 67 IN | BODY MASS INDEX: 29.03 KG/M2 | WEIGHT: 185 LBS

## 2023-07-31 DIAGNOSIS — Z91.89 AT HIGH RISK FOR BREAST CANCER: ICD-10-CM

## 2023-07-31 PROCEDURE — G1004 CDSM NDSC: HCPCS

## 2023-07-31 PROCEDURE — C8908 MRI W/O FOL W/CONT, BREAST,: HCPCS

## 2023-07-31 PROCEDURE — A9585 GADOBUTROL INJECTION: HCPCS | Performed by: FAMILY MEDICINE

## 2023-07-31 PROCEDURE — C8937 CAD BREAST MRI: HCPCS

## 2023-07-31 RX ADMIN — GADOBUTROL 8 ML: 604.72 INJECTION INTRAVENOUS at 14:42

## 2023-08-20 PROBLEM — Z90.49 S/P SMALL BOWEL RESECTION: Status: ACTIVE | Noted: 2023-08-20

## 2023-08-20 PROBLEM — K55.069 MESENTERIC VENOUS THROMBOSIS (HCC): Status: RESOLVED | Noted: 2022-02-09 | Resolved: 2023-08-20

## 2023-09-01 ENCOUNTER — OFFICE VISIT (OUTPATIENT)
Dept: FAMILY MEDICINE CLINIC | Facility: CLINIC | Age: 53
End: 2023-09-01
Payer: COMMERCIAL

## 2023-09-01 VITALS
TEMPERATURE: 97.2 F | HEART RATE: 76 BPM | HEIGHT: 67 IN | DIASTOLIC BLOOD PRESSURE: 84 MMHG | WEIGHT: 182 LBS | BODY MASS INDEX: 28.56 KG/M2 | SYSTOLIC BLOOD PRESSURE: 122 MMHG | RESPIRATION RATE: 16 BRPM

## 2023-09-01 DIAGNOSIS — E66.3 OVERWEIGHT (BMI 25.0-29.9): Primary | ICD-10-CM

## 2023-09-01 DIAGNOSIS — E66.9 OBESITY (BMI 30-39.9): ICD-10-CM

## 2023-09-01 PROCEDURE — 99213 OFFICE O/P EST LOW 20 MIN: CPT | Performed by: FAMILY MEDICINE

## 2023-09-01 RX ORDER — PHENTERMINE HYDROCHLORIDE 37.5 MG/1
37.5 TABLET ORAL EVERY MORNING
Qty: 90 TABLET | Refills: 0 | Status: SHIPPED | OUTPATIENT
Start: 2023-09-01 | End: 2023-09-01 | Stop reason: SDUPTHER

## 2023-09-01 RX ORDER — PHENTERMINE HYDROCHLORIDE 37.5 MG/1
37.5 TABLET ORAL EVERY MORNING
Qty: 90 TABLET | Refills: 0 | Status: SHIPPED | OUTPATIENT
Start: 2023-09-01

## 2023-09-01 NOTE — PROGRESS NOTES
Assessment/Plan:    No problem-specific Assessment & Plan notes found for this encounter. 6w f/u recommended  Continue lifestyle changes and efforts  Plan for wean when wt loss stalls     Diagnoses and all orders for this visit:    Overweight (BMI 25.0-29. 9)    BMI 28.0-28.9,adult    Other orders  -     Discontinue: phentermine (ADIPEX-P) 37.5 MG tablet; Take 1 tablet (37.5 mg total) by mouth every morning (before breakfast)        Return in about 6 weeks (around 10/13/2023). Subjective:      Patient ID: Kymberly Calderon is a 48 y.o. female. Chief Complaint   Patient presents with   • Follow-up     Review medications Jmoyle LPN       HPI     Tolerating meds    Denies palpitations, chest pain, mood changes, irritability, WILHELM, edema or syncope. Use of phentermine as a short term adjunct to diet/exercise program aware. Accepts risks of medications as discussed. Controls diet/calories still  Limits calories  Cardio exercise 4x/w    Lost wt since last visit  3# in 6w    The following portions of the patient's history were reviewed and updated as appropriate: allergies, current medications, past family history, past medical history, past social history, past surgical history and problem list.    Review of Systems   Cardiovascular: Negative for chest pain and palpitations. Current Outpatient Medications   Medication Sig Dispense Refill   • aspirin 81 mg chewable tablet Chew 1 tablet daily     • Calcium Carbonate-Vit D-Min (CALCIUM 1200 PO) Take by mouth in the morning     • estradiol (Estrace) 1 mg tablet Take 1 tablet (1 mg total) by mouth daily 90 tablet 0   • pantoprazole (PROTONIX) 40 mg tablet Take 1 tablet (40 mg total) by mouth daily 90 tablet 0   • phentermine (ADIPEX-P) 37.5 MG tablet Take 1 tablet (37.5 mg total) by mouth every morning (before breakfast) 90 tablet 0     No current facility-administered medications for this visit.        Objective:    /84   Pulse 76   Temp (!) 97.2 °F (36.2 °C)   Resp 16   Ht 5' 7" (1.702 m)   Wt 82.6 kg (182 lb)   BMI 28.51 kg/m²        Physical Exam  Vitals and nursing note reviewed. Constitutional:       General: She is not in acute distress. Appearance: She is well-developed. She is not ill-appearing. HENT:      Head: Normocephalic. Right Ear: Tympanic membrane normal.      Left Ear: Tympanic membrane normal.   Eyes:      General: No scleral icterus. Conjunctiva/sclera: Conjunctivae normal.   Cardiovascular:      Rate and Rhythm: Normal rate and regular rhythm. Heart sounds: No murmur heard. Pulmonary:      Effort: Pulmonary effort is normal. No respiratory distress. Breath sounds: No wheezing. Abdominal:      Palpations: Abdomen is soft. Musculoskeletal:         General: No deformity. Cervical back: Neck supple. Right lower leg: No edema. Left lower leg: No edema. Skin:     General: Skin is warm and dry. Coloration: Skin is not pale. Neurological:      Mental Status: She is alert. Motor: No weakness. Gait: Gait normal.   Psychiatric:         Mood and Affect: Mood normal.         Behavior: Behavior normal.         Thought Content:  Thought content normal.                Samuel Bessie, DO

## 2023-09-22 ENCOUNTER — ANNUAL EXAM (OUTPATIENT)
Dept: OBGYN CLINIC | Facility: CLINIC | Age: 53
End: 2023-09-22
Payer: COMMERCIAL

## 2023-09-22 VITALS
BODY MASS INDEX: 28.88 KG/M2 | DIASTOLIC BLOOD PRESSURE: 82 MMHG | SYSTOLIC BLOOD PRESSURE: 120 MMHG | HEIGHT: 67 IN | WEIGHT: 184 LBS

## 2023-09-22 DIAGNOSIS — Z12.31 ENCOUNTER FOR SCREENING MAMMOGRAM FOR BREAST CANCER: ICD-10-CM

## 2023-09-22 DIAGNOSIS — Z80.3 FAMILY HISTORY OF BREAST CANCER: ICD-10-CM

## 2023-09-22 DIAGNOSIS — Z01.419 WOMEN'S ANNUAL ROUTINE GYNECOLOGICAL EXAMINATION: Primary | ICD-10-CM

## 2023-09-22 DIAGNOSIS — N95.1 SYMPTOMATIC MENOPAUSAL OR FEMALE CLIMACTERIC STATES: ICD-10-CM

## 2023-09-22 PROCEDURE — S0612 ANNUAL GYNECOLOGICAL EXAMINA: HCPCS | Performed by: STUDENT IN AN ORGANIZED HEALTH CARE EDUCATION/TRAINING PROGRAM
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
## 2023-09-25 ENCOUNTER — TELEPHONE (OUTPATIENT)
Dept: HEMATOLOGY ONCOLOGY | Facility: CLINIC | Age: 53
End: 2023-09-25

## 2023-09-25 NOTE — TELEPHONE ENCOUNTER
I called Sawyer Villalta in response to a referral that was received for patient to establish care with [facility]. Outreach was made to schedule a consultation. I left a voicemail explaining the reason for my call and advised patient to call [facility] at [phone]. The referral has been closed.

## 2023-09-26 ENCOUNTER — TELEPHONE (OUTPATIENT)
Dept: HEMATOLOGY ONCOLOGY | Facility: CLINIC | Age: 53
End: 2023-09-26

## 2023-09-26 NOTE — TELEPHONE ENCOUNTER
I spoke with Patricia Anderson to schedule their consultation with [facility]. Scheduling Outcome: Patient is scheduled for an appointment on 12/21/23 at 11:00am with Astrid Flores    Personal/Family History Related to Appointment:     Personal History of Cancer: Patient reports no personal history of cancer    Family History of Cancer: Patient reports family history of Paternal Grandmother - Breast, Mom- Breast Cancer, Maternal Aunt - Breast and Colon, Dad - Head and Neck cancer    Is patient of [address] [place] [address],  [address]?: No    History of Genetic Testing:  Personal History of Genetic Testing: Patient report no personal history of Genetic Testing. Family History of Genetic Testing: Patient reports that member(s) of their family have had Genetic Testing. Details: Sister  - Patient will bring report to appointment    Progeny:  Is patient able to complete our family history questionnaire on a computer?:  Yes    Patient's preferred e-mail address: [email or web address]

## 2023-10-03 ENCOUNTER — TELEPHONE (OUTPATIENT)
Age: 53
End: 2023-10-03

## 2023-10-19 ENCOUNTER — OFFICE VISIT (OUTPATIENT)
Dept: GASTROENTEROLOGY | Facility: CLINIC | Age: 53
End: 2023-10-19
Payer: COMMERCIAL

## 2023-10-19 VITALS
HEART RATE: 56 BPM | BODY MASS INDEX: 29.66 KG/M2 | WEIGHT: 189 LBS | SYSTOLIC BLOOD PRESSURE: 145 MMHG | HEIGHT: 67 IN | DIASTOLIC BLOOD PRESSURE: 99 MMHG

## 2023-10-19 DIAGNOSIS — R19.7 DIARRHEA, UNSPECIFIED TYPE: ICD-10-CM

## 2023-10-19 DIAGNOSIS — K21.9 GASTROESOPHAGEAL REFLUX DISEASE WITHOUT ESOPHAGITIS: Primary | ICD-10-CM

## 2023-10-19 PROCEDURE — 99204 OFFICE O/P NEW MOD 45 MIN: CPT | Performed by: INTERNAL MEDICINE
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
## 2023-10-19 NOTE — PATIENT INSTRUCTIONS
Scheduled date of EGD/colonoscopy (as of today): 10/31/23  Physician performing EGD/colonoscopy: Dr. Denise Webster   Location of EGD/colonoscopy: [facility]  Desired bowel prep reviewed with patient: Miralax   Instructions reviewed with patient by: Nura HERNANDEZ  Clearances:  n/a
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
## 2023-10-27 ENCOUNTER — ANESTHESIA (OUTPATIENT)
Dept: ANESTHESIOLOGY | Facility: AMBULATORY SURGERY CENTER | Age: 53
End: 2023-10-27

## 2023-10-27 ENCOUNTER — ANESTHESIA EVENT (OUTPATIENT)
Dept: ANESTHESIOLOGY | Facility: AMBULATORY SURGERY CENTER | Age: 53
End: 2023-10-27

## 2023-11-01 ENCOUNTER — ANESTHESIA EVENT (OUTPATIENT)
Dept: GASTROENTEROLOGY | Facility: AMBULATORY SURGERY CENTER | Age: 53
End: 2023-11-01

## 2023-11-01 ENCOUNTER — HOSPITAL ENCOUNTER (OUTPATIENT)
Dept: GASTROENTEROLOGY | Facility: AMBULATORY SURGERY CENTER | Age: 53
Discharge: HOME | End: 2023-11-01
Payer: COMMERCIAL

## 2023-11-01 ENCOUNTER — ANESTHESIA (OUTPATIENT)
Dept: GASTROENTEROLOGY | Facility: AMBULATORY SURGERY CENTER | Age: 53
End: 2023-11-01

## 2023-11-01 VITALS
RESPIRATION RATE: 18 BRPM | SYSTOLIC BLOOD PRESSURE: 118 MMHG | HEART RATE: 74 BPM | HEIGHT: 67 IN | WEIGHT: 184 LBS | TEMPERATURE: 96.8 F | OXYGEN SATURATION: 98 % | BODY MASS INDEX: 28.88 KG/M2 | DIASTOLIC BLOOD PRESSURE: 74 MMHG

## 2023-11-01 DIAGNOSIS — R19.7 DIARRHEA, UNSPECIFIED TYPE: ICD-10-CM

## 2023-11-01 DIAGNOSIS — K21.9 GASTROESOPHAGEAL REFLUX DISEASE WITHOUT ESOPHAGITIS: ICD-10-CM

## 2023-11-01 DIAGNOSIS — K21.9 GERD (GASTROESOPHAGEAL REFLUX DISEASE): ICD-10-CM

## 2023-11-01 PROBLEM — T88.59XA DELAYED EMERGENCE FROM ANESTHESIA: Status: ACTIVE | Noted: 2023-11-01

## 2023-11-01 PROCEDURE — 45380 COLONOSCOPY AND BIOPSY: CPT | Performed by: INTERNAL MEDICINE

## 2023-11-01 PROCEDURE — 88305 TISSUE EXAM BY PATHOLOGIST: CPT | Performed by: STUDENT IN AN ORGANIZED HEALTH CARE EDUCATION/TRAINING PROGRAM

## 2023-11-01 PROCEDURE — 43239 EGD BIOPSY SINGLE/MULTIPLE: CPT | Performed by: INTERNAL MEDICINE

## 2023-11-01 PROCEDURE — 88342 IMHCHEM/IMCYTCHM 1ST ANTB: CPT | Performed by: STUDENT IN AN ORGANIZED HEALTH CARE EDUCATION/TRAINING PROGRAM

## 2023-11-01 PROCEDURE — 88360 TUMOR IMMUNOHISTOCHEM/MANUAL: CPT | Performed by: STUDENT IN AN ORGANIZED HEALTH CARE EDUCATION/TRAINING PROGRAM

## 2023-11-01 RX ORDER — SODIUM CHLORIDE, SODIUM LACTATE, POTASSIUM CHLORIDE, CALCIUM CHLORIDE 600; 310; 30; 20 MG/100ML; MG/100ML; MG/100ML; MG/100ML
125 INJECTION, SOLUTION INTRAVENOUS CONTINUOUS
Status: CANCELLED | OUTPATIENT
Start: 2023-11-01

## 2023-11-01 RX ORDER — SODIUM CHLORIDE, SODIUM LACTATE, POTASSIUM CHLORIDE, CALCIUM CHLORIDE 600; 310; 30; 20 MG/100ML; MG/100ML; MG/100ML; MG/100ML
125 INJECTION, SOLUTION INTRAVENOUS CONTINUOUS
Status: DISCONTINUED | OUTPATIENT
Start: 2023-11-01 | End: 2023-11-05 | Stop reason: HOSPADM

## 2023-11-01 RX ORDER — PANTOPRAZOLE SODIUM 40 MG/1
40 TABLET, DELAYED RELEASE ORAL DAILY
Qty: 90 TABLET | Refills: 1 | Status: SHIPPED | OUTPATIENT
Start: 2023-11-01

## 2023-11-01 RX ORDER — LIDOCAINE HYDROCHLORIDE 20 MG/ML
INJECTION, SOLUTION EPIDURAL; INFILTRATION; INTRACAUDAL; PERINEURAL AS NEEDED
Status: DISCONTINUED | OUTPATIENT
Start: 2023-11-01 | End: 2023-11-01

## 2023-11-01 RX ORDER — PROPOFOL 10 MG/ML
INJECTION, EMULSION INTRAVENOUS AS NEEDED
Status: DISCONTINUED | OUTPATIENT
Start: 2023-11-01 | End: 2023-11-01

## 2023-11-01 RX ADMIN — PROPOFOL 50 MG: 10 INJECTION, EMULSION INTRAVENOUS at 13:46

## 2023-11-01 RX ADMIN — PROPOFOL 30 MG: 10 INJECTION, EMULSION INTRAVENOUS at 13:33

## 2023-11-01 RX ADMIN — PROPOFOL 50 MG: 10 INJECTION, EMULSION INTRAVENOUS at 13:53

## 2023-11-01 RX ADMIN — PROPOFOL 20 MG: 10 INJECTION, EMULSION INTRAVENOUS at 13:44

## 2023-11-01 RX ADMIN — SODIUM CHLORIDE, SODIUM LACTATE, POTASSIUM CHLORIDE, CALCIUM CHLORIDE: 600; 310; 30; 20 INJECTION, SOLUTION INTRAVENOUS at 13:24

## 2023-11-01 RX ADMIN — PROPOFOL 30 MG: 10 INJECTION, EMULSION INTRAVENOUS at 13:43

## 2023-11-01 RX ADMIN — PROPOFOL 50 MG: 10 INJECTION, EMULSION INTRAVENOUS at 13:36

## 2023-11-01 RX ADMIN — LIDOCAINE HYDROCHLORIDE 100 MG: 20 INJECTION, SOLUTION EPIDURAL; INFILTRATION; INTRACAUDAL; PERINEURAL at 13:31

## 2023-11-01 RX ADMIN — PROPOFOL 150 MG: 10 INJECTION, EMULSION INTRAVENOUS at 13:31

## 2023-11-01 RX ADMIN — PROPOFOL 30 MG: 10 INJECTION, EMULSION INTRAVENOUS at 13:38

## 2023-11-01 RX ADMIN — PROPOFOL 30 MG: 10 INJECTION, EMULSION INTRAVENOUS at 13:56

## 2023-11-01 RX ADMIN — PROPOFOL 10 MG: 10 INJECTION, EMULSION INTRAVENOUS at 13:58

## 2023-11-01 RX ADMIN — PROPOFOL 40 MG: 10 INJECTION, EMULSION INTRAVENOUS at 13:49

## 2023-11-01 RX ADMIN — PROPOFOL 20 MG: 10 INJECTION, EMULSION INTRAVENOUS at 13:34

## 2023-11-01 RX ADMIN — PROPOFOL 10 MG: 10 INJECTION, EMULSION INTRAVENOUS at 13:59

## 2023-11-01 RX ADMIN — PROPOFOL 30 MG: 10 INJECTION, EMULSION INTRAVENOUS at 13:41

## 2023-11-01 NOTE — ANESTHESIA POSTPROCEDURE EVALUATION
Post-Op Assessment Note    CV Status:  Stable  Pain Score: 0    Pain management: adequate     Mental Status:  Sleepy and arousable   Hydration Status:  Euvolemic   PONV Controlled:  Controlled   Airway Patency:  Patent      Post Op Vitals Reviewed: Yes      Staff: Anesthesiologist, CRNA         No notable events documented.     /57 (11/01/23 1404)    Temp     Pulse 73 (11/01/23 1404)   Resp 18 (11/01/23 1404)    SpO2 98 % (11/01/23 1404)

## 2023-11-01 NOTE — INTERVAL H&P NOTE
H&P reviewed. After examining the patient I find no changes in the patients condition since the H&P had been written.     Vitals:    11/01/23 1302   BP: 132/79   Pulse: 73   Resp: 18   Temp: (!) 96.8 °F (36 °C)   SpO2: 96%

## 2023-11-01 NOTE — ANESTHESIA PREPROCEDURE EVALUATION
Procedure:  EGD  COLONOSCOPY    Relevant Problems   ANESTHESIA   (+) Delayed emergence from anesthesia      CARDIO   (+) Holosystolic murmur      Other   (+) Prediabetes   (+) S/P small bowel resection        TTE report from 2014 reviewed, EF 60%, no significant valvular dysfunction reported. Physical Exam    Airway    Mallampati score: II  TM Distance: >3 FB  Neck ROM: full     Dental        Cardiovascular      Pulmonary      Other Findings        Anesthesia Plan  ASA Score- 2     Anesthesia Type- IV sedation with anesthesia with ASA Monitors. Additional Monitors:     Airway Plan:            Plan Factors-Exercise tolerance (METS): >4 METS. Chart reviewed. EKG reviewed. Existing labs reviewed. Patient summary reviewed. Patient is not a current smoker. Induction- intravenous. Postoperative Plan-     Informed Consent- Anesthetic plan and risks discussed with patient. I personally reviewed this patient with the CRNA. Discussed and agreed on the Anesthesia Plan with the CRNA. Darylene Pipe

## 2023-11-08 PROCEDURE — 88360 TUMOR IMMUNOHISTOCHEM/MANUAL: CPT | Performed by: STUDENT IN AN ORGANIZED HEALTH CARE EDUCATION/TRAINING PROGRAM

## 2023-11-08 PROCEDURE — 88305 TISSUE EXAM BY PATHOLOGIST: CPT | Performed by: STUDENT IN AN ORGANIZED HEALTH CARE EDUCATION/TRAINING PROGRAM

## 2023-11-08 PROCEDURE — 88342 IMHCHEM/IMCYTCHM 1ST ANTB: CPT | Performed by: STUDENT IN AN ORGANIZED HEALTH CARE EDUCATION/TRAINING PROGRAM

## 2023-11-24 ENCOUNTER — DOCUMENTATION (OUTPATIENT)
Dept: GENETICS | Facility: CLINIC | Age: 53
End: 2023-11-24

## 2023-12-20 ENCOUNTER — TELEPHONE (OUTPATIENT)
Dept: OBGYN CLINIC | Facility: CLINIC | Age: 53
End: 2023-12-20

## 2023-12-20 NOTE — TELEPHONE ENCOUNTER
Pt called and said she would like her hormone levels checked and cortisol.  She would like to go back on the estrace if she can.

## 2023-12-21 ENCOUNTER — CLINICAL SUPPORT (OUTPATIENT)
Dept: GENETICS | Facility: CLINIC | Age: 53
End: 2023-12-21
Payer: COMMERCIAL

## 2023-12-21 ENCOUNTER — DOCUMENTATION (OUTPATIENT)
Dept: GENETICS | Facility: CLINIC | Age: 53
End: 2023-12-21

## 2023-12-21 DIAGNOSIS — Z80.0 FAMILY HISTORY OF COLON CANCER: Primary | ICD-10-CM

## 2023-12-21 DIAGNOSIS — Z80.3 FAMILY HISTORY OF BREAST CANCER: ICD-10-CM

## 2023-12-21 DIAGNOSIS — Z80.8 FAMILY HISTORY OF SKIN CANCER: ICD-10-CM

## 2023-12-21 PROCEDURE — 36415 COLL VENOUS BLD VENIPUNCTURE: CPT
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
## 2024-01-04 ENCOUNTER — TELEPHONE (OUTPATIENT)
Dept: GENETICS | Facility: CLINIC | Age: 54
End: 2024-01-04

## 2024-01-04 DIAGNOSIS — Z91.89 AT HIGH RISK FOR BREAST CANCER: Primary | ICD-10-CM

## 2024-01-04 DIAGNOSIS — Z80.3 FAMILY HISTORY OF BREAST CANCER: ICD-10-CM

## 2024-01-04 NOTE — TELEPHONE ENCOUNTER
Post-Test Genetic Counseling Consult Note  Today I spoke with Leslee over the phone to review the results of her genetic test for hereditary cancer. We met previously on 12/21 for pre-test counseling.  A copy of this consult note and genetic test result will be shared with the patient.        SUMMARY:    Test(s): CustomNext: Cancer® +RNAinsight® (61 genes): APC, COURTNEY, AXIN2, BAP1, BARD1, BMPR1A, BRCA1, BRCA2, BRIP1, CDH1, CDK4, CDKN1B, CDKN2A, CHEK2, CTNNA1, DICER1, EGLN1, EPCAM, FH, FLCN, GREM1, HOXB13, KIF1B, KIT, MAX, MEN1, MET, MITF, MLH1, MSH2, MSH3, MSH6, MUTYH, NBN, NF1, NTHL1, PALB2, PMS2, POLD1, POLE, POT1, PTEN, RAD50, RAD51C, RAD51D, RB1, RECQL, RET, SDHA, SDHAF2, SDHB, SDHC, SDHD, SMAD4, SMARCA4, STK11, HVFF302, TP53, TSC1, TSC2, VHL     Result: Negative - No Clinically Significant Variants Detected      Assessment:   A negative result significantly reduces the likelihood that Leslee has a hereditary cancer syndrome. However, this testing is unable to completely rule out the presence of hereditary cancer. It remains possible that:  There is a variant in an area of a gene which was not tested or there is a variant not detectable due to technical limitations of this test.     There is a variant in another gene that was not included in this test or in a gene not known to be linked to cancer or tumors.   A family member has a genetic variant that the patient did not inherit.   The cancer in the family is sporadic and is related to non-hereditary factors.     Risk based on Family History:    Leslee's risk of breast cancer may still be increased based on her personal risk factors and family history. Despite a negative test result, we are able to run risk models to better estimate Leslee's lifetime risk of developing breast cancer. I ran three risk models based on the information Leslee provided during our pre-test counseling session:    Reillyer-Ivette model: Estimated lifetime risk, to age 85, for breast cancer is  25.3% compared to approximately 8.8% general population risk     Leonor model:Estimated lifetime risk, to age 90, for breast cancer is 16.1% compared to approximately 10.6% general population risk     Justino tables: Estimated lifetime risk, to age 79, for breast cancer is 12.4%    The Reillyer-Phick model predicts that Danks lifetime risk for breast cancer is at least 20% therefore she qualifies for increased breast cancer screening, which is outlined below in the plan section.    Risks and Testing for Family Members:    Despite a negative result, Leslee's first-degree relatives may be at increased risk for the cancers based on the family history. We recommend they discuss screening and management recommendations with their healthcare providers.    At this time we do not recommend testing for Leslee 's children based on her negative test result.  Leslee Hidalgos children still need to consider the history of cancer on the other side of their family when determining their risks.     If Leslee has any affected family members with a cancer diagnosis, especially at a young age, they may still consider genetic testing. Relatives who wish to pursue genetic testing can reach out to the [facility] [phone]) to schedule an appointment or visit [email or web address] to identify a local genetic counselor.      Additional Information:  A healthy lifestyle will improve overall health and reduce risk for illness. Eating a healthy diet and exercising for 4 hours per week is recommended. Both diet and exercise have been shown to help maintain a healthy weight. Postmenopausal women who are overweight are at higher risk for breast cancer. Moderate to heavy alcohol use can increase the risk for some cancers. Smoking cigarettes can also increase risk for breast, lung, prostate, pancreatic and other cancers.      Plan:   Orders Placed: Ambulatory Referral to Surgical Oncology for high risk breast cancer  screening    Recommendations for Leslee are outlined below based on her family history, however the surveillance and medical management should continue as clinically indicated and as determined appropriate by her healthcare providers.    Breast cancer screening per the NCCN Breast Cancer Risk Reduction V.2023 and Breast Cancer Screening and Diagnosis Guideline v3.2023  Screening for women who have a lifetime risk of >20% as defined by models that are largely dependent on family history:  Breast awareness  Clinical encounter every 6-12 months   Consider referral to a breast specialist   Annual screening mammogram.  Tomosynthesis is recommended if available beginning 10 years prior to the youngest family member but not less than age 30 years  Annual breast MRI to begin 10 years prior to the youngest family member but not less than age 25 years  Consider whole breast ultrasound or contrast-enhanced mammography for those who qualify for but cannot undergo MRI    Negative Result: Leslee was strongly encouraged to contact us regarding any changes in her personal or family history of cancer as these changes could alter our recommendation regarding genetic testing and/or cancer screening.

## 2024-01-05 ENCOUNTER — DOCUMENTATION (OUTPATIENT)
Dept: HEMATOLOGY ONCOLOGY | Facility: CLINIC | Age: 54
End: 2024-01-05

## 2024-01-05 ENCOUNTER — TELEPHONE (OUTPATIENT)
Dept: HEMATOLOGY ONCOLOGY | Facility: CLINIC | Age: 54
End: 2024-01-05

## 2024-01-05 NOTE — TELEPHONE ENCOUNTER
I called Leslee in response to a referral that was received for patient to establish care with Surgical Oncology.     Outreach was made to schedule a consultation.    A consultation was scheduled for patient during this call. Patient is scheduled on 3/4/24 at 1:00pm with Rosendo Peter at the [facility]

## 2024-01-05 NOTE — PROGRESS NOTES
Intake received- chart reviewed for external records retrieval.    Patient is scheduled on 3-4-2024 with Dr. Rosendo Peter for Dx:  At high risk for breast cancer   Family history of breast cancer    .    Due to Dx on patient's referral, no records retrieval needed by Oncology Care Coordination.

## 2024-01-05 NOTE — TELEPHONE ENCOUNTER
I called Leslee in response to a referral that was received for patient to establish care with Surgical Oncology.     Outreach was made to schedule a consultation.    I left a voicemail explaining the reason for my call and advised patient to call [facility] at [phone].  Another attempt will be made to contact patient.

## 2024-01-23 ENCOUNTER — OFFICE VISIT (OUTPATIENT)
Dept: OBGYN CLINIC | Facility: CLINIC | Age: 54
End: 2024-01-23
Payer: COMMERCIAL

## 2024-01-23 VITALS
BODY MASS INDEX: 31.92 KG/M2 | HEIGHT: 67 IN | SYSTOLIC BLOOD PRESSURE: 134 MMHG | WEIGHT: 203.4 LBS | DIASTOLIC BLOOD PRESSURE: 90 MMHG

## 2024-01-23 DIAGNOSIS — Z79.890 HORMONE REPLACEMENT THERAPY (HRT): ICD-10-CM

## 2024-01-23 DIAGNOSIS — N95.1 SYMPTOMATIC MENOPAUSAL OR FEMALE CLIMACTERIC STATES: ICD-10-CM

## 2024-01-23 DIAGNOSIS — R53.83 OTHER FATIGUE: ICD-10-CM

## 2024-01-23 DIAGNOSIS — N95.1 VASOMOTOR SYMPTOMS DUE TO MENOPAUSE: Primary | ICD-10-CM

## 2024-01-23 PROCEDURE — 99214 OFFICE O/P EST MOD 30 MIN: CPT | Performed by: STUDENT IN AN ORGANIZED HEALTH CARE EDUCATION/TRAINING PROGRAM

## 2024-01-23 RX ORDER — ESTRADIOL 1 MG/1
1 TABLET ORAL EVERY OTHER DAY
Qty: 90 TABLET | Refills: 0 | Status: SHIPPED | OUTPATIENT
Start: 2024-01-23

## 2024-01-23 NOTE — PROGRESS NOTES
Assessment/Plan:  Leslee Stephenson is a 54 y.o.  with mod-severe vasomotor symptoms of menopause who presents for consultation    No problem-specific Assessment & Plan notes found for this encounter.       Diagnoses and all orders for this visit:    Vasomotor symptoms due to menopause  -     Lipid panel; Future  -     TSH, 3rd generation with Free T4 reflex; Future    Symptomatic menopausal or female climacteric states  -     estradiol (Estrace) 1 mg tablet; Take 1 tablet (1 mg total) by mouth every other day  -     Lipid panel; Future    Other fatigue  -     TSH, 3rd generation with Free T4 reflex; Future    Hormone replacement therapy (HRT)        Previously managed on estradiol, came off last year, symptoms recurred and have not eased  Tried estroven without success  reviewed spectrum of treatment for menopausal symptoms including but not limited to: SSRIs (reviewed SE: impact on libido), gabapentin (for night sweats/hot flashes), vaginal estrogen (wont address hot flashes/night sweats), and HRT (addresses all symptoms, but associated with risk of breast cancer, CVD, DVT/VTE--she is overall low risk, calculate 10-year ASCVD risk score 2.1%). reviewed patch lower risk of DVT/VTE.  Would like to restart pill, will use lower dose 1mg tablet every other day sent, but recommend 0.5mg QOD. That way if insufficient relief can increase back to 1mg QOD or 0.5mg QD.  Recommend daily BP checks, repeat lipid panel--if CVD risk increased, may need to wean or stop and rediscuss paxil of gabapentin.  RTO in 3-4 months to check BP and symptoms      Subjective:      Patient ID: Leslee Stephenson is a 54 y.o. female.    HPI  HRT estradiol 2mg since hysterectomy in 2016--> 1mg QD still occasional hot flashes, mild vaginal dryness   Transitioned to 1mg for about a year or so  Stopped estradiol 1mg shortly after seeing me in 2024  But then more frequent hot flashes, especially at night  Interrupting sleep  Feels exhausted  Tried  "estroven, not helpful at all  Would like to resume estrace    Some vaginal dryness  Uses lubrication for intercourse    Mom had breast cancer  Leslee had negative testing  Undergoes mammo and planning to incorporate breast MRI     The following portions of the patient's history were reviewed and updated as appropriate: allergies, current medications, past medical history, past social history, past surgical history, and problem list.    Review of Systems  --per HPI    Objective:  /90 (BP Location: Left arm, Patient Position: Sitting, Cuff Size: Standard)   Ht 5' 7\" (1.702 m)   Wt 92.3 kg (203 lb 6.4 oz)   BMI 31.86 kg/m²      Physical Exam  Constitutional:       Appearance: Normal appearance.   HENT:      Head: Normocephalic and atraumatic.      Nose: Nose normal.   Eyes:      Extraocular Movements: Extraocular movements intact.      Conjunctiva/sclera: Conjunctivae normal.   Pulmonary:      Effort: Pulmonary effort is normal.   Musculoskeletal:         General: Normal range of motion.      Cervical back: Normal range of motion.   Neurological:      General: No focal deficit present.      Mental Status: She is alert.   Psychiatric:         Mood and Affect: Mood normal.         Behavior: Behavior normal.         Thought Content: Thought content normal.           "

## 2024-02-01 ENCOUNTER — OFFICE VISIT (OUTPATIENT)
Dept: GASTROENTEROLOGY | Facility: CLINIC | Age: 54
End: 2024-02-01
Payer: COMMERCIAL

## 2024-02-01 VITALS
SYSTOLIC BLOOD PRESSURE: 149 MMHG | BODY MASS INDEX: 30.61 KG/M2 | DIASTOLIC BLOOD PRESSURE: 90 MMHG | HEIGHT: 67 IN | HEART RATE: 69 BPM | WEIGHT: 195 LBS

## 2024-02-01 DIAGNOSIS — Z90.49 HISTORY OF BOWEL RESECTION: ICD-10-CM

## 2024-02-01 DIAGNOSIS — R19.7 DIARRHEA, UNSPECIFIED TYPE: Primary | ICD-10-CM

## 2024-02-01 PROCEDURE — 99213 OFFICE O/P EST LOW 20 MIN: CPT | Performed by: INTERNAL MEDICINE
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
## 2024-02-05 ENCOUNTER — APPOINTMENT (OUTPATIENT)
Dept: RADIOLOGY | Facility: CLINIC | Age: 54
End: 2024-02-05
Payer: COMMERCIAL

## 2024-02-05 ENCOUNTER — OFFICE VISIT (OUTPATIENT)
Dept: OBGYN CLINIC | Facility: CLINIC | Age: 54
End: 2024-02-05
Payer: COMMERCIAL

## 2024-02-05 VITALS
HEART RATE: 69 BPM | WEIGHT: 195 LBS | SYSTOLIC BLOOD PRESSURE: 158 MMHG | DIASTOLIC BLOOD PRESSURE: 93 MMHG | HEIGHT: 67 IN | BODY MASS INDEX: 30.61 KG/M2

## 2024-02-05 DIAGNOSIS — M25.562 ACUTE PAIN OF LEFT KNEE: ICD-10-CM

## 2024-02-05 DIAGNOSIS — S83.412A SPRAIN OF MEDIAL COLLATERAL LIGAMENT OF LEFT KNEE, INITIAL ENCOUNTER: Primary | ICD-10-CM

## 2024-02-05 PROCEDURE — 99213 OFFICE O/P EST LOW 20 MIN: CPT | Performed by: ORTHOPAEDIC SURGERY

## 2024-02-05 PROCEDURE — 73564 X-RAY EXAM KNEE 4 OR MORE: CPT

## 2024-02-05 PROCEDURE — 73562 X-RAY EXAM OF KNEE 3: CPT
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
## 2024-02-09 ENCOUNTER — APPOINTMENT (OUTPATIENT)
Dept: LAB | Facility: HOSPITAL | Age: 54
End: 2024-02-09
Attending: STUDENT IN AN ORGANIZED HEALTH CARE EDUCATION/TRAINING PROGRAM
Payer: COMMERCIAL

## 2024-02-09 DIAGNOSIS — Z13.1 SCREENING FOR DIABETES MELLITUS (DM): ICD-10-CM

## 2024-02-09 DIAGNOSIS — Z13.83 SCREENING FOR CARDIOVASCULAR, RESPIRATORY, AND GENITOURINARY DISEASES: ICD-10-CM

## 2024-02-09 DIAGNOSIS — N95.1 SYMPTOMATIC MENOPAUSAL OR FEMALE CLIMACTERIC STATES: ICD-10-CM

## 2024-02-09 DIAGNOSIS — Z13.6 SCREENING FOR CARDIOVASCULAR, RESPIRATORY, AND GENITOURINARY DISEASES: ICD-10-CM

## 2024-02-09 DIAGNOSIS — R53.83 OTHER FATIGUE: ICD-10-CM

## 2024-02-09 DIAGNOSIS — Z13.89 SCREENING FOR CARDIOVASCULAR, RESPIRATORY, AND GENITOURINARY DISEASES: ICD-10-CM

## 2024-02-09 DIAGNOSIS — N95.1 VASOMOTOR SYMPTOMS DUE TO MENOPAUSE: ICD-10-CM

## 2024-02-09 LAB
ALBUMIN SERPL BCP-MCNC: 4.2 G/DL (ref 3.5–5)
ALP SERPL-CCNC: 65 U/L (ref 34–104)
ALT SERPL W P-5'-P-CCNC: 34 U/L (ref 7–52)
ANION GAP SERPL CALCULATED.3IONS-SCNC: 6 MMOL/L
AST SERPL W P-5'-P-CCNC: 21 U/L (ref 13–39)
BILIRUB SERPL-MCNC: 0.51 MG/DL (ref 0.2–1)
BUN SERPL-MCNC: 14 MG/DL (ref 5–25)
CALCIUM SERPL-MCNC: 9.5 MG/DL (ref 8.4–10.2)
CHLORIDE SERPL-SCNC: 105 MMOL/L (ref 96–108)
CHOLEST SERPL-MCNC: 203 MG/DL
CO2 SERPL-SCNC: 29 MMOL/L (ref 21–32)
CREAT SERPL-MCNC: 0.8 MG/DL (ref 0.6–1.3)
GFR SERPL CREATININE-BSD FRML MDRD: 83 ML/MIN/1.73SQ M
GLUCOSE P FAST SERPL-MCNC: 95 MG/DL (ref 65–99)
HDLC SERPL-MCNC: 48 MG/DL
LDLC SERPL CALC-MCNC: 112 MG/DL (ref 0–100)
POTASSIUM SERPL-SCNC: 4.5 MMOL/L (ref 3.5–5.3)
PROT SERPL-MCNC: 6.9 G/DL (ref 6.4–8.4)
SODIUM SERPL-SCNC: 140 MMOL/L (ref 135–147)
TRIGL SERPL-MCNC: 217 MG/DL
TSH SERPL DL<=0.05 MIU/L-ACNC: 2.21 UIU/ML (ref 0.45–4.5)

## 2024-02-09 PROCEDURE — 80053 COMPREHEN METABOLIC PANEL: CPT

## 2024-02-09 PROCEDURE — 80061 LIPID PANEL: CPT

## 2024-02-09 PROCEDURE — 36415 COLL VENOUS BLD VENIPUNCTURE: CPT

## 2024-02-09 PROCEDURE — 84443 ASSAY THYROID STIM HORMONE: CPT

## 2024-02-10 DIAGNOSIS — Z00.6 ENCOUNTER FOR EXAMINATION FOR NORMAL COMPARISON OR CONTROL IN CLINICAL RESEARCH PROGRAM: ICD-10-CM

## 2024-02-13 ENCOUNTER — EVALUATION (OUTPATIENT)
Dept: PHYSICAL THERAPY | Facility: CLINIC | Age: 54
End: 2024-02-13
Payer: COMMERCIAL

## 2024-02-13 DIAGNOSIS — S83.412D SPRAIN OF MEDIAL COLLATERAL LIGAMENT OF LEFT KNEE, SUBSEQUENT ENCOUNTER: Primary | ICD-10-CM

## 2024-02-13 PROCEDURE — 97161 PT EVAL LOW COMPLEX 20 MIN: CPT | Performed by: PHYSICAL THERAPIST

## 2024-02-13 NOTE — PROGRESS NOTES
PT Evaluation     Today's date: 2024  Patient name: Leslee Stephenson  : 1970  MRN: [id number]  Referring provider: Elisabet Story PA-C  Dx:   Encounter Diagnosis     ICD-10-CM    1. Sprain of medial collateral ligament of left knee, subsequent encounter  S83.412D Ambulatory Referral to Physical Therapy        Start Time: 1505  Stop Time: 1540  Total time in clinic (min): 35 minutes  Assessment  Assessment details: Leslee Stephenson is a 54 y.o. female who presents with complaints of Sprain of medial collateral ligament of left knee, subsequent encounter  (primary encounter diagnosis).  No further referral appears necessary at this time based upon examination results.  Patient is presenting with decreased full knee extension and strength leading to limitations with standing, walking, sleeping, performing ADLs, and exercising for work responsibilities. Prognosis is good given HEP compliance and PT 1-2x/wk.  Positive prognostic indicators include positive attitude toward recovery.   Please contact me if you have any questions or recommendations.  Thank you for the opportunity to share in Leslee's care.       Impairments: abnormal gait, abnormal muscle firing, abnormal muscle tone, activity intolerance, impaired balance, impaired physical strength, pain with function and weight-bearing intolerance    Symptom irritability: moderateUnderstanding of Dx/Px/POC: good   Prognosis: good    Plan  Planned modality interventions: cryotherapy  Planned therapy interventions: joint mobilization, manual therapy, neuromuscular re-education, patient education, therapeutic activities, stretching, strengthening, therapeutic exercise, gait training, functional ROM exercises and home exercise program  Frequency: 2x week  Duration in weeks: 8  Treatment plan discussed with: patient        Subjective Evaluation    History of Present Illness  Mechanism of injury: Patient reports that knee pain has been going on for a few months. Felt a  twinge in her knee. Symptoms described as sharp in nature along posterior medial knee wrapping anteriorly. No catching/clicking/locking. Occasionally feels like knee will give out on her. No radiating of symptoms. Symptoms have remained unchanged. Leg extensions irritate left knee and walking long distances (about 30 minutes max). Pain mainly present when leg is fully straight. Irritation sleeping.          Not a recurrent problem   Quality of life: excellent    Patient Goals  Patient goals for therapy: decreased pain, increased strength, independence with ADLs/IADLs and return to sport/leisure activities  Patient goal: increase walking tolerance  Pain  Current pain ratin  At best pain ratin  At worst pain ratin (that can linger for a few days)  Quality: sharp        Short Term:  Pt will report decreased levels of pain by at least 2 subjective ratings in 4 weeks  Pt will demonstrate improved ROM by at least 10 degrees in 4 weeks  Pt will demonstrate improved strength by 1/2 grade in 4 weeks.  Pt will be able to walk/stand without pain in 4 weeks.  Pt will be able to straighten knee fully without pain in 4 weeks.  Long Term:   Pt will be independent in their HEP in 8 weeks  Pt will be pain free with IADL's in 8 weeks.  Pt will be able to walk>45 minutes in 8 weeks   Pt will return to exercising for health and work in 8 weeks without limitations.    Objective    GAIT: slight decreased knee extension with ambulation  Squat assess: WNL, right trunk lean  ¼ squat assess: deferred  SLS: RLE: 10 sec, LLE: < 5 sec.           MMT    Hip         R          L   Flex. 5 5   Extn. 5 4   Abd. 5 4                 MMT         AROM         PROM    Knee      R          L         R          L           R         L   Flex. 5 4p! WNL WNL     Extn. 5 4p! WNL WNL ERP! WNL WNL                         MMT    Ankle         R          L   PF 5 5   DF. 5 5   EHL 5 5     Palpation: MCL, distal medial hamstring group  Quad tone:  decreased LLE   normal RLE    Knee: post drawer = - Lachman’s test= -  anterior draw test= -   Valgus stress test= + slight ache varus stress test =  -  Jeff test   =  -  joint findings= WNL       Insurance:  AMA/CMS Eval/ Re-eval POC expires FOTO Auth #/ Referral # Total    Start date  Expiration date Extension  Visit limitation?  PT only or  PT+OT? Co-Insurance   CMS 2.13.24 4.9.24  Auth needed                          AUTH #:  Date 2.13              Authed: Used 1               Remaining                  Access Code: [email or web address]  URL: [email or web address]  Date: 02/13/2024  Prepared by: Jose Manuel Navarro    Exercises  - Supine Bridge  - 1 x daily - 7 x weekly - 3 sets - 10 reps  - Clamshell  - 1 x daily - 7 x weekly - 3 sets - 10 reps  - Active Straight Leg Raise with Quad Set  - 1 x daily - 7 x weekly - 3 sets - 10 reps  - Squat with Chair Touch  - 1 x daily - 7 x weekly - 3 sets - 10 reps    Precautions: standard practice  Patient provided verbal consent to treatment plan and recommended interventions.    Manuals 2.13.24            visit 1                                                   Neuro Re-Ed             Quad set 10*5''            Clam shell 3*10 LLE            bridge 3*10                                                                Ther Ex             SLR flexion 3*10            Mini squat 3*10            Bike/nu step             Step up             Lateral step up             Stand hip abd.                                       Ther Activity             Pt edu FB                         Gait Training                                       Modalities

## 2024-02-21 ENCOUNTER — OFFICE VISIT (OUTPATIENT)
Dept: PHYSICAL THERAPY | Facility: CLINIC | Age: 54
End: 2024-02-21
Payer: COMMERCIAL

## 2024-02-21 DIAGNOSIS — S83.412D SPRAIN OF MEDIAL COLLATERAL LIGAMENT OF LEFT KNEE, SUBSEQUENT ENCOUNTER: Primary | ICD-10-CM

## 2024-02-21 PROBLEM — Z13.1 SCREENING FOR DIABETES MELLITUS (DM): Status: RESOLVED | Noted: 2019-05-20 | Resolved: 2024-02-21

## 2024-02-21 PROBLEM — Z00.00 HEALTHCARE MAINTENANCE: Status: RESOLVED | Noted: 2019-05-20 | Resolved: 2024-02-21

## 2024-02-21 PROBLEM — Z13.83 SCREENING FOR CARDIOVASCULAR, RESPIRATORY, AND GENITOURINARY DISEASES: Status: RESOLVED | Noted: 2019-05-20 | Resolved: 2024-02-21

## 2024-02-21 PROBLEM — Z13.89 SCREENING FOR CARDIOVASCULAR, RESPIRATORY, AND GENITOURINARY DISEASES: Status: RESOLVED | Noted: 2019-05-20 | Resolved: 2024-02-21

## 2024-02-21 PROBLEM — Z13.6 SCREENING FOR CARDIOVASCULAR, RESPIRATORY, AND GENITOURINARY DISEASES: Status: RESOLVED | Noted: 2019-05-20 | Resolved: 2024-02-21

## 2024-02-21 PROCEDURE — 97112 NEUROMUSCULAR REEDUCATION: CPT

## 2024-02-21 PROCEDURE — 97110 THERAPEUTIC EXERCISES: CPT

## 2024-02-21 NOTE — PROGRESS NOTES
Daily Note     Today's date: 2024  Patient name: Leslee Stephenson  : 1970  MRN: [id number]  Referring provider: Elisabet Story PA-C  Dx:   Encounter Diagnosis     ICD-10-CM    1. Sprain of medial collateral ligament of left knee, subsequent encounter  S83.412D                      Subjective: Pt reports 3/10 pain. She has been adherent to HEP.      Objective: see treatment diary below.       Assessment: Tolerated treatment well. Based on patients low symptoms today, continued advancement of hip and knee strengthening and neuromuscular control activities. Patient tolerated all activities well with little to no pain exacerbated by activities. Patient demonstrated fatigue post treatment and would benefit from continued PT to address functional mobility and return to PLOF.       Plan: Continue per plan of care.        Insurance:  AMA/CMS Eval/ Re-eval POC expires FOTO Auth #/ Referral # Total    Start date  Expiration date Extension  Visit limitation?  PT only or  PT+OT? Co-Insurance   CMS 24 4.9.24  Auth approved    12 visits                     AUTH #:  Date              Authed: Used 1 1              Remaining  11 10               Access Code: [email or web address]  URL: [email or web address]  Date: 2024  Prepared by: Jose Manuel Navarro    Exercises  - Supine Bridge  - 1 x daily - 7 x weekly - 3 sets - 10 reps  - Clamshell  - 1 x daily - 7 x weekly - 3 sets - 10 reps  - Active Straight Leg Raise with Quad Set  - 1 x daily - 7 x weekly - 3 sets - 10 reps  - Squat with Chair Touch  - 1 x daily - 7 x weekly - 3 sets - 10 reps    Precautions: standard practice  Patient provided verbal consent to treatment plan and recommended interventions.    Manuals 24           visit 1 2                                                  Neuro Re-Ed             Quad set 10*5'' 10 x 5 sec           Clam shell 3*10 LLE 3x10 GTB           bridge 3*10 2x10 w/ march           Step down  Eccentric  "ant 4\" 2x10           Balance   Beam side step w/ mini squat x5     Beam tandem FW/BW x5                                     Ther Ex             SLR flexion 3*10 3x10 w/ quad set           Mini squat 3*10            Bike/nu step  RB x 6 min           Step up             Lateral step up             Stand hip abd.             Leg press  3x10 105#                        Ther Activity             Pt edu FB                         Gait Training                                       Modalities                                            "

## 2024-02-27 ENCOUNTER — APPOINTMENT (OUTPATIENT)
Dept: PHYSICAL THERAPY | Facility: CLINIC | Age: 54
End: 2024-02-27
Payer: COMMERCIAL

## 2024-02-29 ENCOUNTER — OFFICE VISIT (OUTPATIENT)
Dept: PHYSICAL THERAPY | Facility: CLINIC | Age: 54
End: 2024-02-29
Payer: COMMERCIAL

## 2024-02-29 DIAGNOSIS — S83.412D SPRAIN OF MEDIAL COLLATERAL LIGAMENT OF LEFT KNEE, SUBSEQUENT ENCOUNTER: Primary | ICD-10-CM

## 2024-02-29 PROCEDURE — 97112 NEUROMUSCULAR REEDUCATION: CPT | Performed by: PHYSICAL THERAPIST

## 2024-02-29 PROCEDURE — 97110 THERAPEUTIC EXERCISES: CPT | Performed by: PHYSICAL THERAPIST

## 2024-02-29 NOTE — PROGRESS NOTES
"Daily Note     Today's date: 2024  Patient name: Leslee Stephenson  : 1970  MRN: [id number]  Referring provider: Elisabet Story PA-C  Dx:   Encounter Diagnosis     ICD-10-CM    1. Sprain of medial collateral ligament of left knee, subsequent encounter  S83.412D                  Subjective: Pt reports pain with walking a good amount. Reports feeling good with bending. Just has discomfort with straightening her leg.     Objective: see treatment diary below.     Assessment: Tolerated treatment well. Discomfort reported when returning upright from low position during eccentric step down and lateral step downs. Slight improvement reported with terminal knee extension in sitting and standing. Added sitting repeated TKE to HEP.     Plan: Continue per plan of care.      Insurance:  AMA/CMS Eval/ Re-eval POC expires FOTO Auth #/ Referral # Total    Start date  Expiration date Extension  Visit limitation?  PT only or  PT+OT? Co-Insurance   CMS 24 4.9.24  Auth approved    12 visits                     AUTH #:  Date             Authed: Used 1 2 3             Remaining  11 10 9              Access Code: [email or web address]  URL: [email or web address]  Date: 2024  Prepared by: Jose Manuel Navarro    Exercises  - Supine Bridge  - 1 x daily - 7 x weekly - 3 sets - 10 reps  - Clamshell  - 1 x daily - 7 x weekly - 3 sets - 10 reps  - Active Straight Leg Raise with Quad Set  - 1 x daily - 7 x weekly - 3 sets - 10 reps  - Squat with Chair Touch  - 1 x daily - 7 x weekly - 3 sets - 10 reps    Precautions: standard practice  Patient provided verbal consent to treatment plan and recommended interventions.    Manuals 24 2 2.          visit 1 2 3                                                 Neuro Re-Ed             Quad set 10*5'' 10 x 5 sec HEP          Clam shell 3*10 LLE 3x10 GTB HEP          bridge 3*10 2x10 w/ march HEP          Step down  Eccentric ant 4\" 2x10 Eccentric ant 4\" 2x10    " "      Balance   Beam side step w/ mini squat x5     Beam tandem FW/BW x5                                     Ther Ex             SLR flexion 3*10 3x10 w/ quad set           Mini squat 3*10  HEP          Bike/nu step  RB x 6 min 6' lvl 4          Step up             Lateral step up   2*10, 4\"          Stand hip abd.   3*10 heavy RLE moving          Leg press  3x10 105# 3*10, 185#          Sit rep TKE with OP   3*10          Stand rep tke    2*10                                    Ther Activity             Pt edu FB                         Gait Training                                            "

## 2024-03-04 ENCOUNTER — CONSULT (OUTPATIENT)
Dept: SURGICAL ONCOLOGY | Facility: CLINIC | Age: 54
End: 2024-03-04
Payer: COMMERCIAL

## 2024-03-04 VITALS
HEIGHT: 67 IN | TEMPERATURE: 97.5 F | HEART RATE: 58 BPM | OXYGEN SATURATION: 95 % | SYSTOLIC BLOOD PRESSURE: 122 MMHG | BODY MASS INDEX: 31.86 KG/M2 | DIASTOLIC BLOOD PRESSURE: 80 MMHG | WEIGHT: 203 LBS | RESPIRATION RATE: 16 BRPM

## 2024-03-04 DIAGNOSIS — Z80.3 FAMILY HISTORY OF BREAST CANCER: ICD-10-CM

## 2024-03-04 DIAGNOSIS — Z91.89 INCREASED RISK OF BREAST CANCER: Primary | ICD-10-CM

## 2024-03-04 PROCEDURE — 99244 OFF/OP CNSLTJ NEW/EST MOD 40: CPT | Performed by: NURSE PRACTITIONER
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
## 2024-03-07 ENCOUNTER — OFFICE VISIT (OUTPATIENT)
Dept: PHYSICAL THERAPY | Facility: CLINIC | Age: 54
End: 2024-03-07
Payer: COMMERCIAL

## 2024-03-07 DIAGNOSIS — S83.412D SPRAIN OF MEDIAL COLLATERAL LIGAMENT OF LEFT KNEE, SUBSEQUENT ENCOUNTER: Primary | ICD-10-CM

## 2024-03-07 PROCEDURE — 97110 THERAPEUTIC EXERCISES: CPT | Performed by: PHYSICAL THERAPIST

## 2024-03-07 NOTE — PROGRESS NOTES
Daily Note     Today's date: 3/7/2024  Patient name: Leslee Stephenson  : 1970  MRN: [id number]  Referring provider: Elisabet Story PA-C  Dx:   Encounter Diagnosis     ICD-10-CM    1. Sprain of medial collateral ligament of left knee, subsequent encounter  S83.412D                  Subjective: Pt reports that she was doing better but aggravated her knee when walking her dog. Reports that she was walking on hills which increased symptoms worse descending. Symptoms increased for 2-3 days and returned back to baseline discomfort posterior knee region and MCL region. Will be seeing referring provider tomorrow.      Objective: see treatment diary below.     Assessment: Patient reported knee discomfort with step up exercise without change with medial or lateral tibial glides. Improved symptoms reported with repeated terminal knee extension on elevated surface in standing. Educated patient to perform this exercise numerous times per day. No pain with squatting reported today.     Plan: Continue per plan of care.      Insurance:  AMA/CMS Eval/ Re-eval POC expires FOTO Auth #/ Referral # Total    Start date  Expiration date Extension  Visit limitation?  PT only or  PT+OT? Co-Insurance   CMS 24 4..24  Auth approved    12 visits                     AUTH #:  Date  2 3.7           Authed: Used 1 2 3 4            Remaining  11 10 9 8             Access Code: [email or web address]  URL: [email or web address]  Date: 2024  Prepared by: Jose Manuel Navarro    Exercises  - Supine Bridge  - 1 x daily - 7 x weekly - 3 sets - 10 reps  - Clamshell  - 1 x daily - 7 x weekly - 3 sets - 10 reps  - Active Straight Leg Raise with Quad Set  - 1 x daily - 7 x weekly - 3 sets - 10 reps  - Squat with Chair Touch  - 1 x daily - 7 x weekly - 3 sets - 10 reps  - quad set    Precautions: standard practice  Patient provided verbal consent to treatment plan and recommended interventions.    Manuals .24 2.29 3.7     "  visit 1 2 3 4      Tibial twist with knee ext.    3*10                          Neuro Re-Ed          Clam shell 3*10 LLE 3x10 GTB HEP       bridge 3*10 2x10 w/ march HEP       Step down  Eccentric ant 4\" 2x10 Eccentric ant 4\" 2x10       Balance   Beam side step w/ mini squat x5     Beam tandem FW/BW x5                            Ther Ex          Mini squat 3*10  HEP       Bike/nu step  RB x 6 min 6' lvl 4       Step up    1*10, 6\" with alt hip drive      Lateral step up   2*10, 4\"       Stand hip abd.   3*10 heavy RLE moving       Leg press  3x10 105# 3*10, 185#       Sit rep TKE with OP   3*10 3*20      Stand rep tke    2*10 10x      Stand foot elevated rep TKE with OP    4*10                Ther Activity          Pt edu FB                   Gait Training                                   "

## 2024-03-08 ENCOUNTER — OFFICE VISIT (OUTPATIENT)
Dept: OBGYN CLINIC | Facility: CLINIC | Age: 54
End: 2024-03-08
Payer: COMMERCIAL

## 2024-03-08 VITALS
BODY MASS INDEX: 31.86 KG/M2 | HEART RATE: 65 BPM | HEIGHT: 67 IN | DIASTOLIC BLOOD PRESSURE: 99 MMHG | SYSTOLIC BLOOD PRESSURE: 151 MMHG | WEIGHT: 203 LBS

## 2024-03-08 DIAGNOSIS — M23.92 INTERNAL DERANGEMENT OF LEFT KNEE: Primary | ICD-10-CM

## 2024-03-08 PROCEDURE — 99213 OFFICE O/P EST LOW 20 MIN: CPT | Performed by: ORTHOPAEDIC SURGERY

## 2024-03-11 ENCOUNTER — OFFICE VISIT (OUTPATIENT)
Dept: PHYSICAL THERAPY | Facility: CLINIC | Age: 54
End: 2024-03-11
Payer: COMMERCIAL

## 2024-03-11 DIAGNOSIS — S83.412D SPRAIN OF MEDIAL COLLATERAL LIGAMENT OF LEFT KNEE, SUBSEQUENT ENCOUNTER: Primary | ICD-10-CM

## 2024-03-11 PROCEDURE — 97110 THERAPEUTIC EXERCISES: CPT | Performed by: PHYSICAL THERAPIST

## 2024-03-11 PROCEDURE — 97140 MANUAL THERAPY 1/> REGIONS: CPT | Performed by: PHYSICAL THERAPIST

## 2024-03-11 NOTE — PROGRESS NOTES
"Daily Note     Today's date: 3/11/2024  Patient name: Leslee Stephenson  : 1970  MRN: [id number]  Referring provider: Elisabet Story PA-C  Dx:   Encounter Diagnosis     ICD-10-CM    1. Sprain of medial collateral ligament of left knee, subsequent encounter  S83.412D                  Subjective: Patient reports walking on Friday and having soreness Saturday and . Has been compliant with HEP. Will be getting MRI on Saturday.     Objective: see treatment diary below.     Assessment: Patient reported knee discomfort with standing hip abduction and with lateral step ups. Slight change in symptoms with tibial ER with repeated knee extension for the better.     Plan: Continue per plan of care.      Insurance:  AMA/CMS Eval/ Re-eval POC expires FOTO Auth #/ Referral # Total    Start date  Expiration date Extension  Visit limitation?  PT only or  PT+OT? Co-Insurance   CMS 24 4.9.24  Auth approved    12 visits                     AUTH #:  Date  2 2.29 3.7 3.11          Authed: Used 1 2 3 4 5           Remaining  11 10 9 8 7            Access Code: [email or web address]  URL: [email or web address]  Date: 2024  Prepared by: Jose Manuel Navarro    Exercises  - Supine Bridge  - 1 x daily - 7 x weekly - 3 sets - 10 reps  - Clamshell  - 1 x daily - 7 x weekly - 3 sets - 10 reps  - Active Straight Leg Raise with Quad Set  - 1 x daily - 7 x weekly - 3 sets - 10 reps  - Squat with Chair Touch  - 1 x daily - 7 x weekly - 3 sets - 10 reps  - quad set    Precautions: standard practice  Patient provided verbal consent to treatment plan and recommended interventions.    Manuals . 2. 2.29 3.7 3.11     visit 1 2 3 4 5     Tibial twist with knee ext.    3*10      Step up with tibial med glide     2*10               Neuro Re-Ed          Clam shell 3*10 LLE 3x10 GTB HEP       bridge 3*10 2x10 w/ march HEP       Step down  Eccentric ant 4\" 2x10 Eccentric ant 4\" 2x10                           Ther Ex        " "  Mini squat 3*10  HEP  TRX 3*10     Bike/nu step  RB x 6 min 6' lvl 4       SLR flexion     2*10 w/hip abd.     Step up    1*10, 6\" with alt hip drive Held     Lateral step up   2*10, 4\"  2*10, 4\"     Stand hip abd.   3*10 heavy RLE moving  3*10 LLE moving heavy loop     Leg press  3x10 105# 3*10, 185#  3*10, 205#     Uni leg press     3*10, 105#     Sit rep TKE with OP   3*10 3*20      Stand rep tke    2*10 10x 10x with tibial ER     Stand foot elevated rep TKE with OP    4*10 2*10               Ther Activity          Pt edu FB                   Gait Training                                   "
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
## 2024-03-12 ENCOUNTER — HOSPITAL ENCOUNTER (OUTPATIENT)
Dept: MRI IMAGING | Facility: HOSPITAL | Age: 54
Discharge: HOME | End: 2024-03-12
Attending: ORTHOPAEDIC SURGERY
Payer: COMMERCIAL

## 2024-03-12 DIAGNOSIS — M23.92 INTERNAL DERANGEMENT OF LEFT KNEE: ICD-10-CM

## 2024-03-12 PROCEDURE — 73721 MRI JNT OF LWR EXTRE W/O DYE: CPT

## 2024-03-14 ENCOUNTER — OFFICE VISIT (OUTPATIENT)
Dept: PHYSICAL THERAPY | Facility: CLINIC | Age: 54
End: 2024-03-14
Payer: COMMERCIAL

## 2024-03-14 DIAGNOSIS — S83.412D SPRAIN OF MEDIAL COLLATERAL LIGAMENT OF LEFT KNEE, SUBSEQUENT ENCOUNTER: Primary | ICD-10-CM

## 2024-03-14 PROCEDURE — 97140 MANUAL THERAPY 1/> REGIONS: CPT

## 2024-03-14 PROCEDURE — 97110 THERAPEUTIC EXERCISES: CPT

## 2024-03-14 NOTE — PROGRESS NOTES
Daily Note     Today's date: 3/14/2024  Patient name: Leslee Stephenson  : 1970  MRN: [id number]  Referring provider: Elisabet Story PA-C  Dx: No diagnosis found.               Subjective: Pt reports feeling okay but she is sore from walking long distances yesterday. Continues to experience medial knee pain.       Objective: See treatment diary below      Assessment: Tolerated treatment well. Manuals provided mild improvements. Pt continues to tolerate treatment well. Performed repeated knee flexion which mildly improved her symptoms. Pt's knee becomes aggravated following lateral movements and w/ TKE. Pt demonstrates good motor control w/ her exercises. Joint soreness was reduced when provocative ROM was avoided. Continue progressing pt as she can tolerate. Patient demonstrated fatigue post treatment, exhibited good technique with therapeutic exercises, and would benefit from continued PT      Plan: Continue per plan of care.      Insurance:  AMA/CMS Eval/ Re-eval POC expires FOTO Auth #/ Referral # Total    Start date  Expiration date Extension  Visit limitation?  PT only or  PT+OT? Co-Insurance   CMS [id number]  Auth approved    12 visits                     AUTH #:  Date  2 2.29 3.7 3.11          Authed: Used 1 2 3 4 5           Remaining  11 10 9 8 7            Access Code: [email or web address]  URL: [email or web address]  Date: 2024  Prepared by: Jose Manuel Navarro    Exercises  - Supine Bridge  - 1 x daily - 7 x weekly - 3 sets - 10 reps  - Clamshell  - 1 x daily - 7 x weekly - 3 sets - 10 reps  - Active Straight Leg Raise with Quad Set  - 1 x daily - 7 x weekly - 3 sets - 10 reps  - Squat with Chair Touch  - 1 x daily - 7 x weekly - 3 sets - 10 reps  - quad set    Precautions: standard practice  Patient provided verbal consent to treatment plan and recommended interventions.    Manuals 24 2 2.29 3.7 3.11 3.14    visit 1 2 3 4 5 6    Tibial twist with knee ext.    3*10     "  Step up with tibial med glide     2*10     Inferomedial patellar mob w/ knee flexion      WM    STM      Distal adductor group WM    Neuro Re-Ed          Clam shell 3*10 LLE 3x10 GTB HEP       bridge 3*10 2x10 w/ march HEP       Step down  Eccentric ant 4\" 2x10 Eccentric ant 4\" 2x10                           Ther Ex          Mini squat 3*10  HEP  TRX 3*10 TRX 3x10    Bike/nu step  RB x 6 min 6' lvl 4       SLR flexion     2*10 w/hip abd. 2x10 w/ hip ABD RTB    Step up    1*10, 6\" with alt hip drive Held     Lateral step up   2*10, 4\"  2*10, 4\" 2x10 6\"    Stand hip abd.   3*10 heavy RLE moving  3*10 LLE moving heavy loop 3x10 RTB    Leg press  3x10 105# 3*10, 185#  3*10, 205# 3x15 205 lb    Uni leg press     3*10, 105# 2x12 105 lb     Sit rep TKE with OP   3*10 3*20      Stand rep tke    2*10 10x 10x with tibial ER     Stand foot elevated rep TKE with OP    4*10 2*10     Repeated flexion       Clin OP 3x15    Ther Activity          Pt edu FB                   Gait Training                                     "

## 2024-03-19 ENCOUNTER — OFFICE VISIT (OUTPATIENT)
Dept: PHYSICAL THERAPY | Facility: CLINIC | Age: 54
End: 2024-03-19
Payer: COMMERCIAL

## 2024-03-19 DIAGNOSIS — S83.412D SPRAIN OF MEDIAL COLLATERAL LIGAMENT OF LEFT KNEE, SUBSEQUENT ENCOUNTER: Primary | ICD-10-CM

## 2024-03-19 PROCEDURE — 97140 MANUAL THERAPY 1/> REGIONS: CPT | Performed by: PHYSICAL THERAPIST

## 2024-03-19 PROCEDURE — 97110 THERAPEUTIC EXERCISES: CPT | Performed by: PHYSICAL THERAPIST

## 2024-03-19 NOTE — PROGRESS NOTES
Daily Note     Today's date: 3/19/2024  Patient name: Leslee Stephenson  : 1970  MRN: [id number]  Referring provider: Elisabet Story PA-C  Dx:   Encounter Diagnosis     ICD-10-CM    1. Sprain of medial collateral ligament of left knee, subsequent encounter  S83.412D                      Subjective: Pt reports that she had swelling in her knee increased after last visit. Reports soreness in back of knee and medial aspect entering treatment.   1 on 1 with PT for 23 minutes. Remaining time independent fitness program.    Objective: See treatment diary below    Assessment: Tolerated treatment well. Patient reported feeling okay with treatment with unchanged soreness medial aspect of knee with ambulation. No pain with leg press or squats reported. Slight ache with full knee extension.     Plan: Continue per plan of care.      Insurance:  AMA/CMS Eval/ Re-eval POC expires FOTO Auth #/ Referral # Total    Start date  Expiration date Extension  Visit limitation?  PT only or  PT+OT? Co-Insurance   CMS 2..24 4.9.24  Auth approved    12 visits                     AUTH #:  Date  2. 2.29 3.7 3.11 3.14 3.19        Authed: Used 1 2 3 4 5 6 7         Remaining  11 10 9 8 7 6 5          Access Code: [email or web address]  URL: [email or web address]  Date: 2024  Prepared by: Jose Manuel Navarro    Exercises  - Supine Bridge  - 1 x daily - 7 x weekly - 3 sets - 10 reps  - Clamshell  - 1 x daily - 7 x weekly - 3 sets - 10 reps  - Active Straight Leg Raise with Quad Set  - 1 x daily - 7 x weekly - 3 sets - 10 reps  - Squat with Chair Touch  - 1 x daily - 7 x weekly - 3 sets - 10 reps  - quad set    Precautions: standard practice  Patient provided verbal consent to treatment plan and recommended interventions.    Manuals 2.29 3.7 3.11 3.14 3.19   visit 3 4 5 6 7   Tibial twist with knee ext.  3*10      Step up with tibial med glide   2*10     Inferomedial patellar mob w/ knee flexion    WM    STM    Distal adductor  "group [facility]   Neuro Re-Ed        Clam shell HEP       bridge HEP       Step down Eccentric ant 4\" 2x10                       Ther Ex        Mini squat HEP  TRX 3*10 TRX 3x10 2*15 free stand   Bike/nu step 6' lvl 4       SLR flexion   2*10 w/hip abd. 2x10 w/ hip ABD RTB 3*10 with hip abd.   Step up  1*10, 6\" with alt hip drive Held  1*10, 6\" with alt hip drive   Lateral step up 2*10, 4\"  2*10, 4\" 2x10 6\"    Stand hip abd. 3*10 heavy RLE moving  3*10 LLE moving heavy loop 3x10 RTB S/l hip abd. 3*10   Leg press 3*10, 185#  3*10, 205# 3x15 205 lb 3*15, 205#   Uni leg press   3*10, 105# 2x12 105 lb  3*12, 105#   Sit rep TKE with OP 3*10 3*20      Stand rep tke  2*10 10x 10x with tibial ER     Stand foot elevated rep TKE with OP  4*10 2*10     Repeated flexion     Clin OP 3x15 HELD   Ther Activity        Pt edu                Gait Training                               "

## 2024-03-21 ENCOUNTER — OFFICE VISIT (OUTPATIENT)
Dept: PHYSICAL THERAPY | Facility: CLINIC | Age: 54
End: 2024-03-21
Payer: COMMERCIAL

## 2024-03-21 DIAGNOSIS — S83.412D SPRAIN OF MEDIAL COLLATERAL LIGAMENT OF LEFT KNEE, SUBSEQUENT ENCOUNTER: Primary | ICD-10-CM

## 2024-03-21 PROCEDURE — 97530 THERAPEUTIC ACTIVITIES: CPT | Performed by: PHYSICAL THERAPIST

## 2024-03-21 PROCEDURE — 97110 THERAPEUTIC EXERCISES: CPT | Performed by: PHYSICAL THERAPIST

## 2024-03-21 NOTE — PROGRESS NOTES
Daily Note     Today's date: 3/21/2024  Patient name: Leslee Stephenson  : 1970  MRN: [id number]  Referring provider: Elisabet Story PA-C  Dx:   Encounter Diagnosis     ICD-10-CM    1. Sprain of medial collateral ligament of left knee, subsequent encounter  S83.412D                  Subjective: Pt reports that she continues to have discomfort with ambulation for periods of time. Reports no change in symptoms since starting therapy. Will be seeing referring provider tomorrow.     Objective: See treatment diary below    Assessment: Tolerated treatment fair. Patient has made strength gains since starting therapy however reports no change in symptoms. She continues to have medial knee soreness/discomfort with ambulation and any type of lateral movement. Patient is being placed on hold from therapy at this time.     Plan: Patient case to be placed on hold until follow up with referring physician.      Insurance:  AMA/CMS Eval/ Re-eval POC expires FOTO Auth #/ Referral # Total    Start date  Expiration date Extension  Visit limitation?  PT only or  PT+OT? Co-Insurance   CMS 2..24 4.9.24  Auth approved    12 visits                     AUTH #:  Date 2 2. 2.29 3.7 3.11 3.14 3.19 3.21       Authed: Used 1 2 3 4 5 6 7 8        Remaining  11 10 9 8 7 6 5 4         Access Code: [email or web address]  URL: [email or web address]  Date: 2024  Prepared by: Jose Manuel Navarro    Exercises  - Supine Bridge  - 1 x daily - 7 x weekly - 3 sets - 10 reps  - Clamshell  - 1 x daily - 7 x weekly - 3 sets - 10 reps  - Active Straight Leg Raise with Quad Set  - 1 x daily - 7 x weekly - 3 sets - 10 reps  - Squat with Chair Touch  - 1 x daily - 7 x weekly - 3 sets - 10 reps  - quad set    Precautions: standard practice  Patient provided verbal consent to treatment plan and recommended interventions.    Manuals 2.29 3.7 3.11 3.14 3.19 3.21   visit 3 4 5 6 7 8   Tibial twist with knee ext.  3*10       Step up with tibial med  "glide   2*10      Inferomedial patellar mob w/ knee flexion    WM     STM    Distal adductor group WM IASTM MCL    Neuro Re-Ed         Clam shell HEP        bridge HEP        Step down Eccentric ant 4\" 2x10     2*10, 6\"                     Ther Ex         Mini squat HEP  TRX 3*10 TRX 3x10 2*15 free stand 3*10 TRX   Bike/nu step 6' lvl 4     5' lvl 2   SLR flexion   2*10 w/hip abd. 2x10 w/ hip ABD RTB 3*10 with hip abd.    Step up  1*10, 6\" with alt hip drive Held  1*10, 6\" with alt hip drive    TRX side lunge      3*10   Side step      5 lap 10' light loop   Lateral step up 2*10, 4\"  2*10, 4\" 2x10 6\"     Stand hip abd. 3*10 heavy RLE moving  3*10 LLE moving heavy loop 3x10 RTB S/l hip abd. 3*10    Leg press 3*10, 185#  3*10, 205# 3x15 205 lb 3*15, 205# 4*10 155-205#   Uni leg press   3*10, 105# 2x12 105 lb  3*12, 105# 3*12, 105#   Sit rep TKE with OP 3*10 3*20       Stand rep tke  2*10 10x 10x with tibial ER      Repeated flexion     Clin OP 3x15 HELD    Ther Activity         Pt edu      FB            Gait Training                                  "

## 2024-03-22 ENCOUNTER — OFFICE VISIT (OUTPATIENT)
Dept: OBGYN CLINIC | Facility: CLINIC | Age: 54
End: 2024-03-22
Payer: COMMERCIAL

## 2024-03-22 VITALS
DIASTOLIC BLOOD PRESSURE: 88 MMHG | HEIGHT: 67 IN | BODY MASS INDEX: 31.86 KG/M2 | HEART RATE: 78 BPM | SYSTOLIC BLOOD PRESSURE: 138 MMHG | WEIGHT: 203 LBS

## 2024-03-22 DIAGNOSIS — S83.242D OTHER TEAR OF MEDIAL MENISCUS OF LEFT KNEE AS CURRENT INJURY, SUBSEQUENT ENCOUNTER: Primary | ICD-10-CM

## 2024-03-22 PROCEDURE — 99214 OFFICE O/P EST MOD 30 MIN: CPT | Performed by: ORTHOPAEDIC SURGERY

## 2024-03-22 RX ORDER — CHLORHEXIDINE GLUCONATE ORAL RINSE 1.2 MG/ML
15 SOLUTION DENTAL ONCE
OUTPATIENT
Start: 2024-03-22 | End: 2024-03-22

## 2024-03-22 RX ORDER — CEFAZOLIN SODIUM 2 G/50ML
2000 SOLUTION INTRAVENOUS ONCE
OUTPATIENT
Start: 2024-03-22 | End: 2024-03-22
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
## 2024-03-27 ENCOUNTER — ANESTHESIA EVENT (OUTPATIENT)
Dept: PERIOP | Facility: HOSPITAL | Age: 54
End: 2024-03-27
Payer: COMMERCIAL

## 2024-03-27 RX ORDER — ASPIRIN 81 MG/1
TABLET, CHEWABLE ORAL
COMMUNITY
Start: 2024-01-27 | End: 2024-04-03

## 2024-03-27 RX ORDER — MAGNESIUM GLYCINATE 100 MG
CAPSULE ORAL
COMMUNITY
Start: 2024-01-27

## 2024-03-27 RX ORDER — CHLORAL HYDRATE 500 MG
CAPSULE ORAL
COMMUNITY
Start: 2024-01-27

## 2024-03-27 NOTE — PRE-PROCEDURE INSTRUCTIONS
Pre-Surgery Instructions:   Medication Instructions    aspirin 81 mg chewable tablet Stop taking 7 days prior to surgery.    Calcium Carbonate-Vit D-Min (CALCIUM 1200 PO) Stop taking 7 days prior to surgery.    estradiol (Estrace) 1 mg tablet Hold day of surgery.    Magnesium Glycinate 100 MG CAPS Stop taking 7 days prior to surgery.    Omega-3 Fatty Acids (Omega-3 Fish Oil) 1000 MG CAPS Stop taking 7 days prior to surgery.    pantoprazole (PROTONIX) 40 mg tablet Take day of surgery.    Medication instructions for day surgery reviewed. Please use only a sip of water to take your instructed medications. Avoid all over the counter vitamins, supplements and NSAIDS for one week prior to surgery per anesthesia guidelines. Tylenol is ok to take as needed.     You will receive a call one business day prior to surgery with an arrival time and hospital directions. If your surgery is scheduled on a Monday, the hospital will be calling you on the Friday prior to your surgery. If you have not heard from anyone by 8pm, please call the hospital supervisor through the hospital  at [phone]. ([place] [phone] or [place] [phone]).    Do not eat or drink anything after midnight the night before your surgery, including candy, mints, lifesavers, or chewing gum. Do not drink alcohol 24hrs before your surgery. Try not to smoke at least 24hrs before your surgery.       Follow the pre surgery showering instructions as listed in the “My Surgical Experience Booklet” or otherwise provided by your surgeon's office. Do not use a blade to shave the surgical area 1 week before surgery. It is okay to use a clean electric clippers up to 24 hours before surgery. Do not apply any lotions, creams, including makeup, cologne, deodorant, or perfumes after showering on the day of your surgery. Do not use dry shampoo, hair spray, hair gel, or any type of hair products.     No contact lenses, eye make-up, or artificial eyelashes.  Remove nail polish, including gel polish, and any artificial, gel, or acrylic nails if possible. Remove all jewelry including rings and body piercing jewelry.     Wear causal clothing that is easy to take on and off. Consider your type of surgery.    Keep any valuables, jewelry, piercings at home. Please bring any specially ordered equipment (sling, braces) if indicated.    Arrange for a responsible person to drive you to and from the hospital on the day of your surgery. Please confirm the visitor policy for the day of your procedure when you receive your phone call with an arrival time.     Call the surgeon's office with any new illnesses, exposures, or additional questions prior to surgery.    Please reference your “My Surgical Experience Booklet” for additional information to prepare for your upcoming surgery.

## 2024-03-29 ENCOUNTER — TELEPHONE (OUTPATIENT)
Dept: OBGYN CLINIC | Facility: CLINIC | Age: 54
End: 2024-03-29

## 2024-03-29 ENCOUNTER — APPOINTMENT (OUTPATIENT)
Dept: LAB | Facility: HOSPITAL | Age: 54
End: 2024-03-29

## 2024-03-29 DIAGNOSIS — Z00.6 ENCOUNTER FOR EXAMINATION FOR NORMAL COMPARISON OR CONTROL IN CLINICAL RESEARCH PROGRAM: ICD-10-CM

## 2024-03-29 PROCEDURE — 36415 COLL VENOUS BLD VENIPUNCTURE: CPT

## 2024-03-29 NOTE — TELEPHONE ENCOUNTER
Spoke with patient about disability forms and let them know that these where completed. The patient stated that they would be coming to the [place] office to get these forms.

## 2024-04-02 NOTE — ANESTHESIA PREPROCEDURE EVALUATION
Procedure:  Knee Arthroscopic partial medial meniscectomy, possible medial meniscus repair (Left: Knee)    Relevant Problems   ANESTHESIA   (+) Delayed emergence from anesthesia      CARDIO   (+) Holosystolic murmur      Ear/Nose/Throat   (+) Laryngopharyngeal reflux (LPR)        Physical Exam    Airway    Mallampati score: II  TM Distance: >3 FB  Neck ROM: full     Dental   Comment: Denies loose teeth     Cardiovascular  Cardiovascular exam normal    Pulmonary  Pulmonary exam normal     Other Findings  Portions of exam deferred due to low yield and/or unknown COVID statuspost-pubertal.      Anesthesia Plan  ASA Score- 2     Anesthesia Type- general with ASA Monitors.         Additional Monitors:     Airway Plan: LMA.           Plan Factors-Exercise tolerance (METS): >4 METS.    Chart reviewed.   Existing labs reviewed. Patient summary reviewed.    Patient is not a current smoker.              Induction- intravenous.    Postoperative Plan-     Informed Consent- Anesthetic plan and risks discussed with patient.  I personally reviewed this patient with the CRNA. Discussed and agreed on the Anesthesia Plan with the CRNA..

## 2024-04-03 ENCOUNTER — ANESTHESIA (OUTPATIENT)
Dept: PERIOP | Facility: HOSPITAL | Age: 54
End: 2024-04-03
Payer: COMMERCIAL

## 2024-04-03 ENCOUNTER — HOSPITAL ENCOUNTER (OUTPATIENT)
Facility: HOSPITAL | Age: 54
Setting detail: OUTPATIENT SURGERY
Discharge: HOME | End: 2024-04-03
Attending: ORTHOPAEDIC SURGERY | Admitting: ORTHOPAEDIC SURGERY
Payer: COMMERCIAL

## 2024-04-03 VITALS
HEART RATE: 54 BPM | DIASTOLIC BLOOD PRESSURE: 74 MMHG | HEIGHT: 67 IN | BODY MASS INDEX: 31.97 KG/M2 | WEIGHT: 203.71 LBS | TEMPERATURE: 97.8 F | RESPIRATION RATE: 16 BRPM | SYSTOLIC BLOOD PRESSURE: 151 MMHG | OXYGEN SATURATION: 100 %

## 2024-04-03 DIAGNOSIS — Z87.828 S/P ARTHROSCOPIC PARTIAL MEDIAL MENISCECTOMY OF LEFT KNEE: Primary | ICD-10-CM

## 2024-04-03 DIAGNOSIS — Z98.890 S/P ARTHROSCOPIC PARTIAL MEDIAL MENISCECTOMY OF LEFT KNEE: Primary | ICD-10-CM

## 2024-04-03 DIAGNOSIS — Z47.89 AFTERCARE FOLLOWING SURGERY OF THE MUSCULOSKELETAL SYSTEM: ICD-10-CM

## 2024-04-03 PROCEDURE — 29881 ARTHRS KNE SRG MNISECTMY M/L: CPT

## 2024-04-03 PROCEDURE — 29881 ARTHRS KNE SRG MNISECTMY M/L: CPT | Performed by: ORTHOPAEDIC SURGERY

## 2024-04-03 PROCEDURE — NC001 PR NO CHARGE: Performed by: ORTHOPAEDIC SURGERY

## 2024-04-03 RX ORDER — SODIUM CHLORIDE, SODIUM LACTATE, POTASSIUM CHLORIDE, CALCIUM CHLORIDE 600; 310; 30; 20 MG/100ML; MG/100ML; MG/100ML; MG/100ML
125 INJECTION, SOLUTION INTRAVENOUS CONTINUOUS
Status: DISCONTINUED | OUTPATIENT
Start: 2024-04-03 | End: 2024-04-03 | Stop reason: HOSPADM

## 2024-04-03 RX ORDER — HYDROMORPHONE HCL/PF 1 MG/ML
0.5 SYRINGE (ML) INJECTION
Status: DISCONTINUED | OUTPATIENT
Start: 2024-04-03 | End: 2024-04-03 | Stop reason: HOSPADM

## 2024-04-03 RX ORDER — CEFAZOLIN SODIUM 2 G/50ML
2000 SOLUTION INTRAVENOUS ONCE
Status: COMPLETED | OUTPATIENT
Start: 2024-04-03 | End: 2024-04-03

## 2024-04-03 RX ORDER — ONDANSETRON 2 MG/ML
4 INJECTION INTRAMUSCULAR; INTRAVENOUS ONCE AS NEEDED
Status: DISCONTINUED | OUTPATIENT
Start: 2024-04-03 | End: 2024-04-03 | Stop reason: HOSPADM

## 2024-04-03 RX ORDER — ONDANSETRON 2 MG/ML
4 INJECTION INTRAMUSCULAR; INTRAVENOUS EVERY 6 HOURS PRN
Status: CANCELLED | OUTPATIENT
Start: 2024-04-03

## 2024-04-03 RX ORDER — BUPIVACAINE HYDROCHLORIDE 5 MG/ML
INJECTION, SOLUTION EPIDURAL; INTRACAUDAL
Status: COMPLETED | OUTPATIENT
Start: 2024-04-03 | End: 2024-04-03

## 2024-04-03 RX ORDER — DEXAMETHASONE SODIUM PHOSPHATE 10 MG/ML
INJECTION, SOLUTION INTRAMUSCULAR; INTRAVENOUS AS NEEDED
Status: DISCONTINUED | OUTPATIENT
Start: 2024-04-03 | End: 2024-04-03

## 2024-04-03 RX ORDER — ONDANSETRON 2 MG/ML
INJECTION INTRAMUSCULAR; INTRAVENOUS AS NEEDED
Status: DISCONTINUED | OUTPATIENT
Start: 2024-04-03 | End: 2024-04-03

## 2024-04-03 RX ORDER — OXYCODONE HYDROCHLORIDE 5 MG/1
5 TABLET ORAL EVERY 6 HOURS PRN
Qty: 15 TABLET | Refills: 0 | Status: SHIPPED | OUTPATIENT
Start: 2024-04-03 | End: 2024-04-13

## 2024-04-03 RX ORDER — ACETAMINOPHEN 325 MG/1
650 TABLET ORAL EVERY 6 HOURS PRN
Status: CANCELLED | OUTPATIENT
Start: 2024-04-03

## 2024-04-03 RX ORDER — ACETAMINOPHEN 500 MG
1000 TABLET ORAL EVERY 8 HOURS
Qty: 60 TABLET | Refills: 0 | Status: SHIPPED | OUTPATIENT
Start: 2024-04-03

## 2024-04-03 RX ORDER — PROPOFOL 10 MG/ML
INJECTION, EMULSION INTRAVENOUS AS NEEDED
Status: DISCONTINUED | OUTPATIENT
Start: 2024-04-03 | End: 2024-04-03

## 2024-04-03 RX ORDER — NAPROXEN 500 MG/1
500 TABLET ORAL 2 TIMES DAILY WITH MEALS
Qty: 20 TABLET | Refills: 0 | Status: SHIPPED | OUTPATIENT
Start: 2024-04-03

## 2024-04-03 RX ORDER — OXYCODONE HYDROCHLORIDE 5 MG/1
5 TABLET ORAL EVERY 4 HOURS PRN
Status: DISCONTINUED | OUTPATIENT
Start: 2024-04-03 | End: 2024-04-03 | Stop reason: HOSPADM

## 2024-04-03 RX ORDER — MIDAZOLAM HYDROCHLORIDE 2 MG/2ML
INJECTION, SOLUTION INTRAMUSCULAR; INTRAVENOUS AS NEEDED
Status: DISCONTINUED | OUTPATIENT
Start: 2024-04-03 | End: 2024-04-03

## 2024-04-03 RX ORDER — SODIUM CHLORIDE, SODIUM LACTATE, POTASSIUM CHLORIDE, CALCIUM CHLORIDE 600; 310; 30; 20 MG/100ML; MG/100ML; MG/100ML; MG/100ML
INJECTION, SOLUTION INTRAVENOUS CONTINUOUS PRN
Status: DISCONTINUED | OUTPATIENT
Start: 2024-04-03 | End: 2024-04-03

## 2024-04-03 RX ORDER — FENTANYL CITRATE/PF 50 MCG/ML
50 SYRINGE (ML) INJECTION
Status: DISCONTINUED | OUTPATIENT
Start: 2024-04-03 | End: 2024-04-03 | Stop reason: HOSPADM

## 2024-04-03 RX ORDER — FENTANYL CITRATE 50 UG/ML
INJECTION, SOLUTION INTRAMUSCULAR; INTRAVENOUS AS NEEDED
Status: DISCONTINUED | OUTPATIENT
Start: 2024-04-03 | End: 2024-04-03

## 2024-04-03 RX ORDER — CHLORHEXIDINE GLUCONATE ORAL RINSE 1.2 MG/ML
15 SOLUTION DENTAL ONCE
Status: COMPLETED | OUTPATIENT
Start: 2024-04-03 | End: 2024-04-03

## 2024-04-03 RX ORDER — ASPIRIN 81 MG/1
81 TABLET, CHEWABLE ORAL 2 TIMES DAILY
Qty: 84 TABLET | Refills: 0 | Status: SHIPPED | OUTPATIENT
Start: 2024-04-03

## 2024-04-03 RX ADMIN — BUPIVACAINE HYDROCHLORIDE 20 ML: 5 INJECTION, SOLUTION EPIDURAL; INTRACAUDAL; PERINEURAL at 07:22

## 2024-04-03 RX ADMIN — FENTANYL CITRATE 25 MCG: 50 INJECTION, SOLUTION INTRAMUSCULAR; INTRAVENOUS at 08:14

## 2024-04-03 RX ADMIN — FENTANYL CITRATE 50 MCG: 50 INJECTION INTRAMUSCULAR; INTRAVENOUS at 08:29

## 2024-04-03 RX ADMIN — OXYCODONE HYDROCHLORIDE 5 MG: 5 TABLET ORAL at 09:16

## 2024-04-03 RX ADMIN — FENTANYL CITRATE 50 MCG: 50 INJECTION INTRAMUSCULAR; INTRAVENOUS at 08:40

## 2024-04-03 RX ADMIN — ONDANSETRON 4 MG: 2 INJECTION INTRAMUSCULAR; INTRAVENOUS at 07:32

## 2024-04-03 RX ADMIN — DEXAMETHASONE SODIUM PHOSPHATE 10 MG: 10 INJECTION, SOLUTION INTRAMUSCULAR; INTRAVENOUS at 07:32

## 2024-04-03 RX ADMIN — CHLORHEXIDINE GLUCONATE 15 ML: 1.2 SOLUTION ORAL at 06:45

## 2024-04-03 RX ADMIN — FENTANYL CITRATE 25 MCG: 50 INJECTION, SOLUTION INTRAMUSCULAR; INTRAVENOUS at 07:32

## 2024-04-03 RX ADMIN — FENTANYL CITRATE 50 MCG: 50 INJECTION INTRAMUSCULAR; INTRAVENOUS at 08:23

## 2024-04-03 RX ADMIN — HYDROMORPHONE HYDROCHLORIDE 0.5 MG: 1 INJECTION, SOLUTION INTRAMUSCULAR; INTRAVENOUS; SUBCUTANEOUS at 08:46

## 2024-04-03 RX ADMIN — FENTANYL CITRATE 50 MCG: 50 INJECTION, SOLUTION INTRAMUSCULAR; INTRAVENOUS at 08:07

## 2024-04-03 RX ADMIN — CEFAZOLIN SODIUM 2000 MG: 2 SOLUTION INTRAVENOUS at 07:35

## 2024-04-03 RX ADMIN — PROPOFOL 100 MG: 10 INJECTION, EMULSION INTRAVENOUS at 07:32

## 2024-04-03 RX ADMIN — MIDAZOLAM 2 MG: 1 INJECTION INTRAMUSCULAR; INTRAVENOUS at 07:22

## 2024-04-03 RX ADMIN — SODIUM CHLORIDE, SODIUM LACTATE, POTASSIUM CHLORIDE, AND CALCIUM CHLORIDE 125 ML/HR: .6; .31; .03; .02 INJECTION, SOLUTION INTRAVENOUS at 06:45

## 2024-04-03 RX ADMIN — SODIUM CHLORIDE, SODIUM LACTATE, POTASSIUM CHLORIDE, AND CALCIUM CHLORIDE: .6; .31; .03; .02 INJECTION, SOLUTION INTRAVENOUS at 07:17

## 2024-04-03 RX ADMIN — PROPOFOL 200 MG: 10 INJECTION, EMULSION INTRAVENOUS at 07:31

## 2024-04-03 NOTE — ANESTHESIA POSTPROCEDURE EVALUATION
Post-Op Assessment Note    CV Status:  Stable  Pain Score: 0    Pain management: adequate       Mental Status:  Awake and alert   Hydration Status:  Euvolemic   PONV Controlled:  Controlled   Airway Patency:  Patent     Post Op Vitals Reviewed: Yes    No anethesia notable event occurred.    Staff: Anesthesiologist, CRNA               /74 (04/03/24 0820)    Temp 97.8 °F (36.6 °C) (04/03/24 0820)    Pulse 66 (04/03/24 0820)   Resp 12 (04/03/24 0820)    SpO2 99 % (04/03/24 0820)

## 2024-04-03 NOTE — DISCHARGE INSTR - AVS FIRST PAGE
Postoperative Instructions Following Knee Surgery    MEDICATIONS:  Resume all home medications unless otherwise instructed by your surgeon.  Pain Medication:   Take Tylenol and Naproxen on prescribed schedule for 7 days  Take Oxycodone as needed for severe pain   If you were given a regional anesthetic (nerve block), it is helpful to take your pain medication before the block wear off.    Possible side effects include nausea, constipation, and urinary retention.  If you experience these side effects, please call our office for assistance.  Pain med refills are authorized only during office hours (8am-4pm, Mon-Fri).  Blood Clot Prevention:   Pump your foot up and down 20 times per hour while you are less mobile.  Ambulate with your crutches at least once every hour   Wear a inocencia stocking on the operative leg at all times except for hygiene for 2 weeks following surgery. Tip for smoother placement of stocking - place a plastic bag over the toes/foot then slide the stockings over the foot/ankle and remove the plastic bag. You may need another person to assist you.  Start Aspirin 81 mg twice daily on POD #1 (4/4/2024) for 6 weeks following surgery    WOUND CARE:  Keep the dressing clean and dry.  Light drainage may occur the first 2 days postop.  You may remove the dressings and get the incision wet in the shower 72 hours after surgery.  DO NOT remove steri-strips or sutures.  DO NOT immerse the incision under water.  Carefully pat the incision dry.  If there is wound drainage, re-apply a fresh dry gauze dressing.  Please call our office ([phone]) if you experience either of the following:  Sudden increase in swelling, redness, or warmth at the surgical site  Excessive incisional drainage that persists beyond the 3rd day after surgery  Oral temperature greater than 101 degrees, not relieved with Tylenol  Shortness of breath, chest pain, nausea, or any other concerning symptoms    Other pain/swelling control  "measures:  Cold Therapy:  Apply ice (20 min on, 20 min off) as often as you feel is necessary. Ice helps with pain.   Elevation:  Elevate the entire leg above heart level.  Place pillows under your ankle to keep your knee straight. This will help with swelling and prevents stiffness   Compression:  Keep an ace wrap on the operative leg until you return to the office. It may be removed to shower as described above.     RANGE OF MOTION:  You are allowed FULL RANGE OF MOTION as tolerated.    IMMOBILIZATION:  None.  You are allowed full range of motion as tolerated.    ACTIVITY:   BEAR FULL WEIGHT AS TOLERATED on the operative leg.  Use crutches to assist only as needed.  Using Crutches on Stairs:  Going up, lead with your \"good\" (nonoperative) leg.  Going down, lead with your \"bad\" (operative) leg.  Use a hand rail when available.  Knee Extension:  Place a rolled towel or pillow under your ankle for 20-30 minutes 3-5 times per day.  This will help to maintain full knee extension.  Quad Sets:  Sit or lie with your knee straight.  Tighten your quadriceps (front thigh) muscle.  Hold for 3 seconds, then relax.  Repeat 20 times per hour while awake.    PHYSICAL THERAPY:  Begin therapy 1 TO 3 DAYS AFTER SURGERY.  You were given a prescription for therapy at your preoperative office visit or on the day of surgery.  If you do not have physical therapy scheduled yet, please call our office for assistance.      FOLLOW-UP APPOINTMENT:  10-14 days with:    Dr. Moises Regalado MD    [facility]  [address] [place] [address] [address], [address]  [place] [address]    [facility] [address] [address]    Phone:   [phone]   "

## 2024-04-03 NOTE — PERIOPERATIVE NURSING NOTE
Received from PACU. Awake,alert. 4/10pain in Left knee. Ice to left knee. Left knee ace dry,intact. Palpable left pedal pulse. Denies nausea, given juice to drink.

## 2024-04-03 NOTE — OP NOTE
OPERATIVE REPORT  PATIENT NAME: Leslee Stephenson    :  1970  MRN: [id number]  Pt Location: [facility] OR ROOM 03    SURGERY DATE: 4/3/2024    Surgeons and Role:     * Karl Regalado MD - Primary     * Elisabet Story PA-C - Assisting    The presence of Elisabet Story PA-C was required for poisitioning, retraction, assistance, and closure. No qualified resident was available.      Preop Diagnosis:  Other tear of medial meniscus of left knee as current injury, subsequent encounter [S83.242D]    Post-Op Diagnosis Codes:     * Other tear of medial meniscus of left knee as current injury, subsequent encounter [S83.242D]    Procedure(s):  Left - Knee Arthroscopic partial medial meniscectomy    Specimen(s):  * No specimens in log *    Estimated Blood Loss:   Minimal    Drains:  * No LDAs found *    Anesthesia Type:   General w/ Regional     Tourniquet time: 16 minutes    Operative Indications:    Patient is a 54-year-old female who sustained an acute injury to her knee.  Exam at that time was consistent with an MCL sprain.  She also did have joint line tenderness and a positive Jeff.  However we started with a course of conservative treatment with bracing and physical therapy.  She saw significant improvements in her pain along the course of the MCL.  She did continue to have occasional sharp shooting pains and a feeling of giving out.  I recommended MRI at that point given her failure to progress back to normal function.  The MRI did show an oblique tear of the posterior horn of the medial meniscus.  There was also partial thickness cartilage thinning in the medial compartment however I did not appreciate any significant joint space narrowing on weightbearing x-rays.  Given her persistent symptoms despite a course of physical therapy and findings on MRI consistent with meniscus tear with only mild degenerative changes in the medial compartment we discussed surgical intervention.  We also discussed continued  nonoperative treatment.  She opted to proceed with surgery.  I recommended a left knee arthroscopy with partial medial meniscectomy.  We discussed the risk benefits and alternatives.  We discussed the expected time for recovery.  She expressed understanding and elected to proceed.  Written consent was signed.     Findings:      Arthroscopic evaluation of the left knee revealed the following:     Medial meniscus: Oblique radial tear of the junction of the posterior horn and body of the medial meniscus  Medial femoral condyle: Grade 2 degenerative changes  Medial tibial plateau: Grade 2 degenerative changes  Anterior cruciate ligament: Normal appearance.  Posterior cruciate ligament: Normal appearance.   Lateral meniscus: Normal  Lateral femoral condyle: Normal  Lateral tibial plateau: Normal  Medial and lateral gutters: No loose bodies.   Patella: Grade 2 degenerative changes  Trochlea: Grade 1 degenerative changes     Procedure:  In the pre-operative holding area, the patient identified the correct operative extremity and I marked that extremity with my initials. The patient was then brought to the operating room and positioned supine. Following satisfactory induction of anesthesia, a tourniquet was placed then the left knee was prepped and draped in the usual sterile fashion for surgical arthroscopy of the left knee. Before any surgical instrumentation was passed to me by the surgical technician, a formalized time-out occurred, which involves the surgeon, circulating nurse, and anesthesia staff all verifying the correct operative extremity. My initials were visible on the prepped and draped operative field.      The limb was exsanguinated and the tourniquet was inflated.  The anatomic landmarks of the anteromedial and anterolateral portals were marked and these portal sites. The anterolateral portal was established with a #11 blade. The arthroscope was introduced through this portal. Under direct visualization, the  anteromedial portal was established with a localizing needle followed by a scalpel. A probe was then introduced into the anteromedial portal. A systematic diagnostic arthroscopy evaluated the following:  medial compartment, notch, lateral compartment, patellofemoral compartment, medial gutter, and lateral gutter.      The medial meniscus tear was present at the junction of the posterior horn and body of the medial meniscus.  This involved primarily the white white zone but did extend into the periphery as well.  However the tissue quality was very poor and it was frayed.  I did not feel this is a repairable tear.  The meniscal root was intact.  To treat the unstable meniscus tear I performed a partial medial meniscectomy using a combination of arthroscopic biter and shaver.  I took the tear back to a stable rim.  There is no additional evidence of tearing.     There was no additional pathology. All particulate debris was removed. The knee was copiously rinsed and then drained. The portals were closed with an interrupted 3-0 nylon suture. The skin was cleansed with sterile saline and dried before Steri-Strips were applied. Finally, a sterile dressing was secured by Webril and an Ace wrap.  Tourniquet was deflated with a total tourniquet time 16 minutes.    The procedure was well-tolerated.  She emerged from anesthesia.  There is no apparent complications.  She was taken to the recovery room in stable condition.        SIGNATURE: Karl Regalado MD  DATE: April 3, 2024  TIME: 8:14 AM

## 2024-04-03 NOTE — ANESTHESIA PROCEDURE NOTES
Peripheral Block    Patient location during procedure: holding area  Start time: 4/3/2024 7:22 AM  Reason for block: at surgeon's request and post-op pain management  Staffing  Performed by: Jacinta Hickey MD  Authorized by: Jacinta Hickey MD    Preanesthetic Checklist  Completed: patient identified, IV checked, site marked, risks and benefits discussed, surgical consent, monitors and equipment checked, pre-op evaluation and timeout performed  Peripheral Block  Patient position: supine  Prep: ChloraPrep  Patient monitoring: frequent blood pressure checks, continuous pulse oximetry and heart rate  Block type: Adductor Canal  Laterality: left  Injection technique: single-shot  Procedures: ultrasound guided, Ultrasound guidance required for the procedure to increase accuracy and safety of medication placement and decrease risk of complications.  Ultrasound permanent image savedbupivacaine (PF) (MARCAINE) 0.5 % injection 20 mL - Perineural   20 mL - 4/3/2024 7:22:00 AM  Needle  Needle type: Stimuplex   Needle gauge: 20 G  Needle length: 4 in  Needle localization: anatomical landmarks and ultrasound guidance  Assessment  Injection assessment: incremental injection, frequent aspiration, injected with ease, negative aspiration, negative for heart rate change, no paresthesia on injection, no symptoms of intraneural/intravenous injection and needle tip visualized at all times  Paresthesia pain: none  Post-procedure:  site cleaned  patient tolerated the procedure well with no immediate complications

## 2024-04-03 NOTE — H&P
H&P reviewed. After examining the patient I find no changes in the patients condition since the H&P had been written.    Vitals:    04/03/24 0630   BP: 143/72   Pulse: 58   Resp: 19   Temp: (!) 97.3 °F (36.3 °C)   SpO2: 99%

## 2024-04-04 ENCOUNTER — EVALUATION (OUTPATIENT)
Dept: PHYSICAL THERAPY | Facility: CLINIC | Age: 54
End: 2024-04-04
Payer: COMMERCIAL

## 2024-04-04 DIAGNOSIS — M25.562 ACUTE PAIN OF LEFT KNEE: ICD-10-CM

## 2024-04-04 DIAGNOSIS — S83.412D SPRAIN OF MEDIAL COLLATERAL LIGAMENT OF LEFT KNEE, SUBSEQUENT ENCOUNTER: ICD-10-CM

## 2024-04-04 DIAGNOSIS — Z98.890 STATUS POST ARTHROSCOPIC PARTIAL MEDIAL MENISCECTOMY: Primary | ICD-10-CM

## 2024-04-04 PROCEDURE — 97164 PT RE-EVAL EST PLAN CARE: CPT | Performed by: PHYSICAL THERAPIST

## 2024-04-04 PROCEDURE — 97110 THERAPEUTIC EXERCISES: CPT | Performed by: PHYSICAL THERAPIST

## 2024-04-04 NOTE — PROGRESS NOTES
Re-evaluation     Today's date: 2024  Patient name: Leslee Stephenson  : 1970  MRN: [id number]  Referring provider: Elisabet Story PA-C  Dx:   Encounter Diagnosis     ICD-10-CM    1. Status post arthroscopic partial medial meniscectomy  Z98.890       2. Sprain of medial collateral ligament of left knee, subsequent encounter  S83.412D       3. Acute pain of left knee  M25.562         Start Time: 1100  Stop Time: 1135  Total time in clinic (min): 35 minutes  Subjective: Patient presenting 1 day post op partial meniscectomy. She reports that she was given nerve block and pain medication. Presenting with bilateral axillary crutches. Does have a few steps to enter home and feels okay with crutches.     Pain  Current pain ratin  At best pain ratin  At worst pain ratin  Quality: soreness/throbbing and knee cap feels unstable       Short Term:  Pt will report decreased levels of pain by at least 2 subjective ratings in 4 weeks  Pt will demonstrate improved ROM by at least 10 degrees in 4 weeks  Pt will demonstrate improved strength by 1/2 grade in 4 weeks.  Pt will be able to walk/stand without pain in 4 weeks.    Long Term:   Pt will be independent in their HEP in 8 weeks  Pt will be pain free with IADL's in 8 weeks.  Pt will be able to walk>45 minutes in 8 weeks   Pt will return to exercising for health and work in 8 weeks without limitations.    Objective: See treatment diary below    GAIT: bilateral axillary crutches with 4 point gait pattern.   Squat assess: 25% depth  SLS: RLE: > 10 sec. LLE: deferred           MMT    Hip         R          L   Flex. 5 3+   Extn. 5 4                 MMT         AROM         PROM    Knee      R          L         R          L           R         L   Flex. 5 4- 125 85  90   Extn. 5 3 0 -12                           MMT    Ankle         R          L   PF 5 3   DF. 5 5   EHL 5 5     Assessment: Tolerated treatment well. Patient  limited with knee ROM and had gauze with  bandage on. Patient education provided and protocol reviewed. Patient demonstrated fair quad activation today. She will benefit from treatment to return to prior level of function.     Plan:  protocol provided. 6-8 weeks to regain full knee ROM and strength. 1-2 x/week.     Insurance:  AMA/CMS Eval/ Re-eval POC expires FOTO Auth #/ Referral # Total    Start date  Expiration date Extension  Visit limitation?  PT only or  PT+OT? Co-Insurance   CMS 2.13.24 4.9.24  Auth approved  2/14 5/14  12 visits     CMS 4.4.24 5.30.24              AUTH #:  Date 2.13 2.21 2.29 3.7 3.11 3.14 3.19 3.21 4.4      Authed: Used 1 2 3 4 5 6 7 8 9       Remaining  11 10 9 8 7 6 5 4 3        AUTH #:  Date               Authed: Used                Remaining                      Access Code: [email or web address]  URL: [email or web address]  Date: 02/13/2024  Prepared by: Jose Manuel Navarro    Exercises  - Supine Bridge  - 1 x daily - 7 x weekly - 3 sets - 10 reps  - Clamshell  - 1 x daily - 7 x weekly - 3 sets - 10 reps  - Active Straight Leg Raise with Quad Set  - 1 x daily - 7 x weekly - 3 sets - 10 reps  - Squat with Chair Touch  - 1 x daily - 7 x weekly - 3 sets - 10 reps  - quad set      Physical Therapy Protocol - Partial Meniscectomy     Wound Care: Keep the dressing clean and dry.  Light drainage may occur the first 2 days postop.   You may remove the dressings and get the incision wet in the shower 72 hours after surgery.  DO NOT remove steri-strips or sutures.  DO NOT immerse the incision under water.  Carefully pat the incision dry.  If there is wound drainage, re-apply a fresh dry gauze dressing.     PHASE I (Weeks 0 - 2):   · Goals: decrease edema, activate quadriceps   · Weightbearing: As tolerated; use crutches as needed. Discontinue when gait normal   · Brace: none required   · Range of Motion: AAROM ? AROM as tolerated   · Therapeutic Exercises: Patellar mobs, quad/hamstring sets, heel slides, step-ups, straight-leg raises,  stationary bike as tolerated; core exercises   · Modalities: Per therapist, including electrical stimulation, ultrasound, heat (before), ice (after)     Phase II (Weeks 2 - 4)   · Weightbearing: As tolerated   · Brace: None   · Range of Motion: Full   · Therapeutic Exercises: Progress Phase I exercises; lunges, wall-sits; add cycling and elliptical   · Modalities: Per therapist, including electrical stimulation, ultrasound, heat (before), ice (after)     Phase III (Weeks 4 - 6)   · Weightbearing: As tolerated   · Brace: None   · Range of Motion: Full   · Therapeutic Exercises: Progress Phase II exercises; add plyometrics and sport or work specific exercises; add running if patient wishes   · Modalities: Per therapist, including electrical stimulation, ultrasound, heat (before), ice (after)     Precautions: standard practice  Patient provided verbal consent to treatment plan and recommended interventions.    Access Code: [email or web address]  URL: [email or web address]  Date: 04/04/2024  Prepared by: Jose Manuel Navarro    Exercises  - Supine Calf Stretch with Strap  - 3 x daily - 7 x weekly - 1 sets - 10 reps - 10 hold  - Supine Quad Set  - 3 x daily - 7 x weekly - 3 sets - 10 reps  - Seated Heel Slide  - 3 x daily - 7 x weekly - 3 sets - 10 reps  - Supine Heel Slide with Strap  - 1 x daily - 7 x weekly - 3 sets - 10 reps  - Supine Gluteal Sets  - 1 x daily - 7 x weekly - 3 sets - 10 reps    Manuals 4.4.24   visit 9               STM    Neuro Re-Ed                        Ther Ex    Quad set 10x   Supine calf stretch 5*10''   SAQ 2*10   Hamstring set 2*10   Ankle pump HEP   Supine heel slide PT PROM- HEP   Seated heel slide instructed   Nu step- st 10 8' lvl 1                   Re-eval performed   Ther Activity    Pt edu FB       Gait Training

## 2024-04-09 ENCOUNTER — OFFICE VISIT (OUTPATIENT)
Dept: PHYSICAL THERAPY | Facility: CLINIC | Age: 54
End: 2024-04-09
Payer: COMMERCIAL

## 2024-04-09 DIAGNOSIS — S83.412D SPRAIN OF MEDIAL COLLATERAL LIGAMENT OF LEFT KNEE, SUBSEQUENT ENCOUNTER: Primary | ICD-10-CM

## 2024-04-09 DIAGNOSIS — M25.562 ACUTE PAIN OF LEFT KNEE: ICD-10-CM

## 2024-04-09 DIAGNOSIS — Z98.890 STATUS POST ARTHROSCOPIC PARTIAL MEDIAL MENISCECTOMY: ICD-10-CM

## 2024-04-09 PROCEDURE — 97110 THERAPEUTIC EXERCISES: CPT | Performed by: PHYSICAL THERAPIST

## 2024-04-09 NOTE — PROGRESS NOTES
Daily Note     Today's date: 2024  Patient name: Leslee Stephenson  : 1970  MRN: [id number]  Referring provider: Elisabet Story PA-C  Dx:   Encounter Diagnosis     ICD-10-CM    1. Sprain of medial collateral ligament of left knee, subsequent encounter  S83.412D       2. Status post arthroscopic partial medial meniscectomy  Z98.890       3. Acute pain of left knee  M25.562                      Subjective: Patient reports having hard time sleeping and discomfort with ambulation. Tightness also reported in calf region described as knot-like. Ambulating with single axillary crutch.    Objective: See treatment diary below    Assessment: Tolerated treatment well. Patient  continues to lack end range knee extension quad activation. Improved knee flexion noted.  Unable to perform straight leg raise at this time.     Plan: Continue per plan of care.      Insurance:  AMA/CMS Eval/ Re-eval POC expires FOTO Auth #/ Referral # Total    Start date  Expiration date Extension  Visit limitation?  PT only or  PT+OT? Co-Insurance   CMS 2.13.24 4.9.24  Auth approved    12 visits     CMS 4.4.24 5.30.24              AUTH #:  Date  2.21 2.29 3.7 3.11 3.14 3.19 3.21 4.4 4.9     Authed: Used 1 2 3 4 5 6 7 8 9 10      Remaining  11 10 9 8 7 6 5 4 3 2       AUTH #:  Date               Authed: Used                Remaining                      Access Code: [email or web address]  URL: [email or web address]  Date: 2024  Prepared by: Jose Manuel Navarro    Exercises  - Supine Bridge  - 1 x daily - 7 x weekly - 3 sets - 10 reps  - Clamshell  - 1 x daily - 7 x weekly - 3 sets - 10 reps  - Active Straight Leg Raise with Quad Set  - 1 x daily - 7 x weekly - 3 sets - 10 reps  - Squat with Chair Touch  - 1 x daily - 7 x weekly - 3 sets - 10 reps  - quad set      Physical Therapy Protocol - Partial Meniscectomy     Wound Care: Keep the dressing clean and dry.  Light drainage may occur the first 2 days postop.   You may remove the  dressings and get the incision wet in the shower 72 hours after surgery.  DO NOT remove steri-strips or sutures.  DO NOT immerse the incision under water.  Carefully pat the incision dry.  If there is wound drainage, re-apply a fresh dry gauze dressing.     PHASE I (Weeks 0 - 2):   · Goals: decrease edema, activate quadriceps   · Weightbearing: As tolerated; use crutches as needed. Discontinue when gait normal   · Brace: none required   · Range of Motion: AAROM ? AROM as tolerated   · Therapeutic Exercises: Patellar mobs, quad/hamstring sets, heel slides, step-ups, straight-leg raises, stationary bike as tolerated; core exercises   · Modalities: Per therapist, including electrical stimulation, ultrasound, heat (before), ice (after)     Phase II (Weeks 2 - 4)   · Weightbearing: As tolerated   · Brace: None   · Range of Motion: Full   · Therapeutic Exercises: Progress Phase I exercises; lunges, wall-sits; add cycling and elliptical   · Modalities: Per therapist, including electrical stimulation, ultrasound, heat (before), ice (after)     Phase III (Weeks 4 - 6)   · Weightbearing: As tolerated   · Brace: None   · Range of Motion: Full   · Therapeutic Exercises: Progress Phase II exercises; add plyometrics and sport or work specific exercises; add running if patient wishes   · Modalities: Per therapist, including electrical stimulation, ultrasound, heat (before), ice (after)     Precautions: standard practice  Patient provided verbal consent to treatment plan and recommended interventions.    Access Code: [email or web address]  URL: [email or web address]  Date: 04/04/2024  Prepared by: Jose Manuel Navarro    Exercises  - Supine Calf Stretch with Strap  - 3 x daily - 7 x weekly - 1 sets - 10 reps - 10 hold  - Supine Quad Set  - 3 x daily - 7 x weekly - 3 sets - 10 reps  - Seated Heel Slide  - 3 x daily - 7 x weekly - 3 sets - 10 reps  - Supine Heel Slide with Strap  - 1 x daily - 7 x weekly - 3 sets - 10 reps  - Supine Gluteal  "Sets  - 1 x daily - 7 x weekly - 3 sets - 10 reps    Manuals 4.4.24 4.9.24   visit 9                   [facility]  FB quad   Neuro Re-Ed                              Ther Ex     Quad set 10x 3*10, 3\"   Supine calf stretch 5*10'' 5*10''   SAQ 2*10 3*10   Hamstring set 2*10 HEP   Ankle pump HEP    Supine heel slide PT PROM- HEP    Seated heel slide instructed    Nu step- st  8' lvl 1 9' lvl 1   LAQ  2*10, 90-40 deg.   Step up  2*10, 4\" LLE             Re-eval performed    Ther Activity     Pt edu FB         Gait Training                          "

## 2024-04-11 ENCOUNTER — OFFICE VISIT (OUTPATIENT)
Dept: PHYSICAL THERAPY | Facility: CLINIC | Age: 54
End: 2024-04-11
Payer: COMMERCIAL

## 2024-04-11 DIAGNOSIS — Z98.890 STATUS POST ARTHROSCOPIC PARTIAL MEDIAL MENISCECTOMY: ICD-10-CM

## 2024-04-11 DIAGNOSIS — S83.412D SPRAIN OF MEDIAL COLLATERAL LIGAMENT OF LEFT KNEE, SUBSEQUENT ENCOUNTER: Primary | ICD-10-CM

## 2024-04-11 DIAGNOSIS — M25.562 ACUTE PAIN OF LEFT KNEE: ICD-10-CM

## 2024-04-11 PROCEDURE — 97110 THERAPEUTIC EXERCISES: CPT | Performed by: PHYSICAL THERAPIST

## 2024-04-11 NOTE — PROGRESS NOTES
Daily Note     Today's date: 2024  Patient name: Leslee Stephenson  : 1970  MRN: [id number]  Referring provider: Elisabet Story PA-C  Dx:   Encounter Diagnosis     ICD-10-CM    1. Sprain of medial collateral ligament of left knee, subsequent encounter  S83.412D       2. Status post arthroscopic partial medial meniscectomy  Z98.890       3. Acute pain of left knee  M25.562           Start Time: 1144  Stop Time: 1230  Total time in clinic (min): 46 minutes    Subjective: Patient reports sleeping continues to be uncomfortable. Reports tightness in thigh. Was standing at store and was getting sharp pains in medial aspect of knee even with use of crutch.     Objective: See treatment diary below    Assessment: Tolerated treatment well. Cueing provided for improving knee extension with exercises and ambulation. Patient reported leg felt worked post session.     Plan: Continue per plan of care.      Insurance:  AMA/CMS Eval/ Re-eval POC expires FOTO Auth #/ Referral # Total    Start date  Expiration date Extension  Visit limitation?  PT only or  PT+OT? Co-Insurance   CMS 2. 4.9.24  Auth approved    12 visits     CMS 4..24 [id number]              AUTH #:  Date 2 2.21 2.29 3.7 3.11 3.14 3.19 3.21 4.4 4.9 4.11    Authed: Used 1 2 3 4 5 6 7 8 9 10 11     Remaining  11 10 9 8 7 6 5 4 3 2 1      AUTH #:  Date               Authed: Used                Remaining                  Physical Therapy Protocol - Partial Meniscectomy     Wound Care: Keep the dressing clean and dry.  Light drainage may occur the first 2 days postop.   You may remove the dressings and get the incision wet in the shower 72 hours after surgery.  DO NOT remove steri-strips or sutures.  DO NOT immerse the incision under water.  Carefully pat the incision dry.  If there is wound drainage, re-apply a fresh dry gauze dressing.     PHASE I (Weeks 0 - 2):   · Goals: decrease edema, activate quadriceps   · Weightbearing: As tolerated; use  "crutches as needed. Discontinue when gait normal   · Brace: none required   · Range of Motion: AAROM ? AROM as tolerated   · Therapeutic Exercises: Patellar mobs, quad/hamstring sets, heel slides, step-ups, straight-leg raises, stationary bike as tolerated; core exercises   · Modalities: Per therapist, including electrical stimulation, ultrasound, heat (before), ice (after)     Phase II (Weeks 2 - 4)   · Weightbearing: As tolerated   · Brace: None   · Range of Motion: Full   · Therapeutic Exercises: Progress Phase I exercises; lunges, wall-sits; add cycling and elliptical   · Modalities: Per therapist, including electrical stimulation, ultrasound, heat (before), ice (after)     Phase III (Weeks 4 - 6)   · Weightbearing: As tolerated   · Brace: None   · Range of Motion: Full   · Therapeutic Exercises: Progress Phase II exercises; add plyometrics and sport or work specific exercises; add running if patient wishes   · Modalities: Per therapist, including electrical stimulation, ultrasound, heat (before), ice (after)     Precautions: standard practice  Patient provided verbal consent to treatment plan and recommended interventions.    Access Code: [email or web address]  URL: [email or web address]  Date: 04/04/2024  Prepared by: Jose Manuel Navarro    Exercises  - Supine Calf Stretch with Strap  - 3 x daily - 7 x weekly - 1 sets - 10 reps - 10 hold  - Supine Quad Set  - 3 x daily - 7 x weekly - 3 sets - 10 reps  - Seated Heel Slide  - 3 x daily - 7 x weekly - 3 sets - 10 reps  - Supine Heel Slide with Strap  - 1 x daily - 7 x weekly - 3 sets - 10 reps  - Supine Gluteal Sets  - 1 x daily - 7 x weekly - 3 sets - 10 reps    Manuals 4.4.24 4.9.24 4.11.24   visit 9 10 11                     STM  FB quad FB quad   Neuro Re-Ed                                    Ther Ex      Quad set 10x 3*10, 3\" 2*10, 5\"   Supine calf stretch 5*10'' 5*10'' HEP   SAQ 2*10 3*10 3*10   Hamstring set 2*10 HEP    Seated heel slide instructed     Nu step- st " " 8' lvl 1 9' lvl 1 Rec bike 1/2 rev   LAQ  2*10, 90-40 deg.    Step up  2*10, 4\" LLE 3*10, 4' LLE   Calf raise   3*10   SLR flexion   3*8 Pt assist   Re-eval performed     Pball roll in    3*10   TKE in stand   2*10                     Ther Activity      Pt edu FB           Gait Training      SPC training   3', step through and back                    "

## 2024-04-15 ENCOUNTER — OFFICE VISIT (OUTPATIENT)
Dept: OBGYN CLINIC | Facility: CLINIC | Age: 54
End: 2024-04-15

## 2024-04-15 ENCOUNTER — OFFICE VISIT (OUTPATIENT)
Dept: PHYSICAL THERAPY | Facility: CLINIC | Age: 54
End: 2024-04-15
Payer: COMMERCIAL

## 2024-04-15 VITALS
DIASTOLIC BLOOD PRESSURE: 85 MMHG | BODY MASS INDEX: 31.86 KG/M2 | HEART RATE: 64 BPM | WEIGHT: 203 LBS | SYSTOLIC BLOOD PRESSURE: 126 MMHG | HEIGHT: 67 IN

## 2024-04-15 DIAGNOSIS — Z98.890 STATUS POST ARTHROSCOPIC PARTIAL MEDIAL MENISCECTOMY: ICD-10-CM

## 2024-04-15 DIAGNOSIS — M25.562 ACUTE PAIN OF LEFT KNEE: ICD-10-CM

## 2024-04-15 DIAGNOSIS — Z98.890 S/P ARTHROSCOPIC PARTIAL MEDIAL MENISCECTOMY OF LEFT KNEE: Primary | ICD-10-CM

## 2024-04-15 DIAGNOSIS — S83.412D SPRAIN OF MEDIAL COLLATERAL LIGAMENT OF LEFT KNEE, SUBSEQUENT ENCOUNTER: Primary | ICD-10-CM

## 2024-04-15 DIAGNOSIS — Z87.828 S/P ARTHROSCOPIC PARTIAL MEDIAL MENISCECTOMY OF LEFT KNEE: Primary | ICD-10-CM

## 2024-04-15 PROCEDURE — 97110 THERAPEUTIC EXERCISES: CPT | Performed by: PHYSICAL THERAPIST

## 2024-04-15 PROCEDURE — 99024 POSTOP FOLLOW-UP VISIT: CPT | Performed by: ORTHOPAEDIC SURGERY

## 2024-04-15 NOTE — PROGRESS NOTES
SUBJECTIVE:  Patient is a 54 y.o. year old female who presents for follow up now POD #12 s/p Knee Arthroscopic partial medial meniscectomy - Left performed on  4/3/2024.  Today, the patient reports sleeping on her side with her knees touching does feel sore. She continues to feel sore/tender along the medial joint line. She also notes tightness along the anterior knee and more frequent painless clicking compared to surgery. The patient has been using a single crutch to help with ambulation and presents with a cane today. She is attending PT consistently per protocol. Leslee is taking ASA 81 mg BID for blood clot prevention. The patient is using Tylenol and Naproxen only as needed at this point. She denies new injury/trauma, calf pain/swelling, and numbness/tingling.       VITALS:  Vitals:    04/15/24 0835   BP: 126/85   Pulse: 64       PHYSICAL EXAMINATION:  General: well developed and well nourished, alert, oriented times 3, and appears comfortable  Psychiatric: Normal    MUSCULOSKELETAL EXAMINATION:    Left Lower Extremity   Incision: Clean, dry, and intact  Range of Motion: just shy of full extension actively, able to achieve full extension easily passively. 45 degrees flexion  Easily fatigued quad  Strength: 5/5 ankle DF/PF  +EHL/FHL/TA/GS  SILT throughout  Palpable DP pulse       PLAN:    We reviewed operative images and reviewed the procedure in detail. I believe the patient is progressing appropriately at this point in her acute post-op period . Continue weight bearing as tolerated, weaning off of using assistive devices as comfortable. Continue physical therapy per provided protocol, emphasizing being able to easily achieve full extension and regaining quad strength. Commend she gently passively stretch at home in addition to exercises she performs regularly to work on motion. We discussed that the tightness and painless clicking she describes are common following knee arthroscopy due to quad weakness,  however, I expect this to continue improving with further PT/HEP.     Use OTC medications and ice as needed for pain control, however, if she is feeling considerably sore, she can use Tylenol/Naproxen on a schedule for better pain control, especially at night. Continue Aspirin 81 mg twice daily for DVT prophylaxis.    Follow up in 4 weeks.

## 2024-04-15 NOTE — PROGRESS NOTES
Daily Note     Today's date: 4/15/2024  Patient name: Leslee Stephenson  : 1970  MRN: [id number]  Referring provider: Elisabet Story PA-C  Dx:   Encounter Diagnosis     ICD-10-CM    1. Sprain of medial collateral ligament of left knee, subsequent encounter  S83.412D       2. Acute pain of left knee  M25.562       3. Status post arthroscopic partial medial meniscectomy  Z98.890                      Subjective: Patient reports trying to ambulate without cane for periods of time. Reports doing okay entering treatment today.   1 on 1 with PT for 25 minutes. Remaining time independent fitness program.    Objective: See treatment diary below    Assessment: Tolerated treatment well. Improved quad activation demonstrated and reduced PT assist needed with SLR flexion. Patient was able to complete full revolutions on recumbent bike today utilized for knee ROM. Tightness reported post session.     Plan: Continue per plan of care.      Insurance:  AMA/CMS Eval/ Re-eval POC expires FOTO Auth #/ Referral # Total    Start date  Expiration date Extension  Visit limitation?  PT only or  PT+OT? Co-Insurance   CMS 2. 4.9.24  Auth approved    12 visits     CMS 4.4.24 5.30.24              AUTH #:  Date 2 2.21 2.29 3.7 3.11 3.14 3.19 3.21 4.4 4.9 4.11 4.15   Authed: Used 1 2 3 4 5 6 7 8 9 10 11 12    Remaining  11 10 9 8 7 6 5 4 3 2 1 0     AUTH #:  Date               Authed: Used                Remaining                  Physical Therapy Protocol - Partial Meniscectomy     Wound Care: Keep the dressing clean and dry.  Light drainage may occur the first 2 days postop.   You may remove the dressings and get the incision wet in the shower 72 hours after surgery.  DO NOT remove steri-strips or sutures.  DO NOT immerse the incision under water.  Carefully pat the incision dry.  If there is wound drainage, re-apply a fresh dry gauze dressing.     PHASE I (Weeks 0 - 2):   · Goals: decrease edema, activate quadriceps   ·  "Weightbearing: As tolerated; use crutches as needed. Discontinue when gait normal   · Brace: none required   · Range of Motion: AAROM ? AROM as tolerated   · Therapeutic Exercises: Patellar mobs, quad/hamstring sets, heel slides, step-ups, straight-leg raises, stationary bike as tolerated; core exercises   · Modalities: Per therapist, including electrical stimulation, ultrasound, heat (before), ice (after)     Phase II (Weeks 2 - 4)   · Weightbearing: As tolerated   · Brace: None   · Range of Motion: Full   · Therapeutic Exercises: Progress Phase I exercises; lunges, wall-sits; add cycling and elliptical   · Modalities: Per therapist, including electrical stimulation, ultrasound, heat (before), ice (after)     Phase III (Weeks 4 - 6)   · Weightbearing: As tolerated   · Brace: None   · Range of Motion: Full   · Therapeutic Exercises: Progress Phase II exercises; add plyometrics and sport or work specific exercises; add running if patient wishes   · Modalities: Per therapist, including electrical stimulation, ultrasound, heat (before), ice (after)     Precautions: standard practice  Patient provided verbal consent to treatment plan and recommended interventions.    Access Code: [email or web address]  URL: [email or web address]  Date: 04/04/2024  Prepared by: Jose Manuel Navarro    Exercises  - Supine Calf Stretch with Strap  - 3 x daily - 7 x weekly - 1 sets - 10 reps - 10 hold  - Supine Quad Set  - 3 x daily - 7 x weekly - 3 sets - 10 reps  - Seated Heel Slide  - 3 x daily - 7 x weekly - 3 sets - 10 reps  - Supine Heel Slide with Strap  - 1 x daily - 7 x weekly - 3 sets - 10 reps  - Supine Gluteal Sets  - 1 x daily - 7 x weekly - 3 sets - 10 reps    Manuals 4.4.24 4.9.24 4.11.24 4.15.24   visit 9 10 11 12                        STM  FB quad FB quad    Neuro Re-Ed                                          Ther Ex       Quad set 10x 3*10, 3\" 2*10, 5\" 2*10, 5\"   Supine calf stretch 5*10'' 5*10'' HEP    SAQ 2*10 3*10 3*10 3*10, " "3\" hold   Hamstring set 2*10 HEP     Seated heel slide instructed      Nu step- st  8' lvl 1 9' lvl 1 Rec bike 1/2 rev 6' rec bike 1/2 to full rev.   LAQ  2*10, 90-40 deg.     Step up  2*10, 4\" LLE 3*10, 4'' LLE 2*10, 6\" LLE   Calf raise   3*10 3*10   SLR flexion   3*8 Pt assist 3*8 Pt assist   Re-eval performed      Pball roll in    3*10 3*10   TKE in stand   2*10    Stand hip abd    3*10 RLE moving   Stand knee flexion on step    3*10, 3\" hold 12\" step          Ther Activity       Pt edu FB             Gait Training       SPC training   3', step through and back                      "

## 2024-04-21 LAB
APOB+LDLR+PCSK9 GENE MUT ANL BLD/T: NOT DETECTED
BRCA1+BRCA2 DEL+DUP + FULL MUT ANL BLD/T: NOT DETECTED
MLH1+MSH2+MSH6+PMS2 GN DEL+DUP+FUL M: NOT DETECTED

## 2024-04-24 ENCOUNTER — OFFICE VISIT (OUTPATIENT)
Dept: PHYSICAL THERAPY | Facility: CLINIC | Age: 54
End: 2024-04-24
Payer: COMMERCIAL

## 2024-04-24 DIAGNOSIS — K21.9 GERD (GASTROESOPHAGEAL REFLUX DISEASE): ICD-10-CM

## 2024-04-24 DIAGNOSIS — M25.562 ACUTE PAIN OF LEFT KNEE: ICD-10-CM

## 2024-04-24 DIAGNOSIS — Z98.890 STATUS POST ARTHROSCOPIC PARTIAL MEDIAL MENISCECTOMY: ICD-10-CM

## 2024-04-24 DIAGNOSIS — S83.412D SPRAIN OF MEDIAL COLLATERAL LIGAMENT OF LEFT KNEE, SUBSEQUENT ENCOUNTER: Primary | ICD-10-CM

## 2024-04-24 PROCEDURE — 97110 THERAPEUTIC EXERCISES: CPT | Performed by: PHYSICAL THERAPIST

## 2024-04-24 RX ORDER — PANTOPRAZOLE SODIUM 40 MG/1
40 TABLET, DELAYED RELEASE ORAL DAILY
Qty: 90 TABLET | Refills: 1 | Status: SHIPPED | OUTPATIENT
Start: 2024-04-24

## 2024-04-24 NOTE — PROGRESS NOTES
Daily Note     Today's date: 2024  Patient name: Leslee Stephenson  : 1970  MRN: [id number]  Referring provider: Elisabet Story PA-C  Dx:   Encounter Diagnosis     ICD-10-CM    1. Sprain of medial collateral ligament of left knee, subsequent encounter  S83.412D       2. Acute pain of left knee  M25.562       3. Status post arthroscopic partial medial meniscectomy  Z98.890                      Subjective: Patient reports continued difficulty sleeping. Walking has improved. Stopped using SPC last week. Turned a certain way and felt a pop in her knee, sore afterward but has not occurred since.     Objective: See treatment diary below    Assessment: Tolerated treatment well. Improved quad control and knee ROM demonstrated during session today. Slight discomfort reported with anterior step up today.     Plan: Continue to progress LE strengthening.      Insurance:  AMA/CMS Eval/ Re-eval POC expires FOTO Auth #/ Referral # Total    Start date  Expiration date Extension  Visit limitation?  PT only or  PT+OT? Co-Insurance   CMS 24 4.924  Auth approved    12 visits     CMS .24 53024              AUTH #:  Date  2.21 2.29 3.7 3.11 3.14 3.19 3.21 4.4 4.9 4.11 4.15   Authed: Used 1 2 3 4 5 6 7 8 9 10 11 12    Remaining  11 10 9 8 7 6 5 4 3 2 1 0     AUTH #:  Date               Authed: Used 1               Remaining  11                Physical Therapy Protocol - Partial Meniscectomy     Wound Care: Keep the dressing clean and dry.  Light drainage may occur the first 2 days postop.   You may remove the dressings and get the incision wet in the shower 72 hours after surgery.  DO NOT remove steri-strips or sutures.  DO NOT immerse the incision under water.  Carefully pat the incision dry.  If there is wound drainage, re-apply a fresh dry gauze dressing.     PHASE I (Weeks 0 - 2):   · Goals: decrease edema, activate quadriceps   · Weightbearing: As tolerated; use crutches as needed. Discontinue when  "gait normal   · Brace: none required   · Range of Motion: AAROM ? AROM as tolerated   · Therapeutic Exercises: Patellar mobs, quad/hamstring sets, heel slides, step-ups, straight-leg raises, stationary bike as tolerated; core exercises   · Modalities: Per therapist, including electrical stimulation, ultrasound, heat (before), ice (after)     Phase II (Weeks 2 - 4)   · Weightbearing: As tolerated   · Brace: None   · Range of Motion: Full   · Therapeutic Exercises: Progress Phase I exercises; lunges, wall-sits; add cycling and elliptical   · Modalities: Per therapist, including electrical stimulation, ultrasound, heat (before), ice (after)     Phase III (Weeks 4 - 6)   · Weightbearing: As tolerated   · Brace: None   · Range of Motion: Full   · Therapeutic Exercises: Progress Phase II exercises; add plyometrics and sport or work specific exercises; add running if patient wishes   · Modalities: Per therapist, including electrical stimulation, ultrasound, heat (before), ice (after)     Precautions: standard practice  Patient provided verbal consent to treatment plan and recommended interventions.    Access Code: [email or web address]  URL: [email or web address]  Date: 04/04/2024  Prepared by: Jose Manuel Navarro    Exercises  - Supine Calf Stretch with Strap  - 3 x daily - 7 x weekly - 1 sets - 10 reps - 10 hold  - Supine Quad Set  - 3 x daily - 7 x weekly - 3 sets - 10 reps  - Seated Heel Slide  - 3 x daily - 7 x weekly - 3 sets - 10 reps  - Supine Heel Slide with Strap  - 1 x daily - 7 x weekly - 3 sets - 10 reps  - Supine Gluteal Sets  - 1 x daily - 7 x weekly - 3 sets - 10 reps    Manuals 4.4.24 4.9.24 4.11.24 4.15.24 4.24   visit 9 10 11 12 13                           STM  FB quad FB quad     Neuro Re-Ed                                                Ther Ex        Supine calf stretch 5*10'' 5*10'' HEP  Stand calf stretch 10*10''   SAQ 2*10 3*10 3*10 3*10, 3\" hold    Nu step- st  8' lvl 1 9' lvl 1 Rec bike 1/2 rev 6' rec " "bike 1/2 to full rev. 8' rec bike full rev   Step up  2*10, 4\" LLE 3*10, 4'' LLE 2*10, 6\" LLE 3*10, 6\" LLE   Calf raise   3*10 3*10 HEP   SLR flexion   3*8 Pt assist 3*8 Pt assist 3*10   Pball roll in    3*10 3*10 3*10   Stand hip abd    3*10 RLE moving    Stand knee flexion on step    3*10, 3\" hold 12\" step    Mini squats     3*10   Leg press     3*10, 135#   Uni leg press     3*12, 55#                                   Ther Activity        Pt edu FB               Gait Training        SPC training   3', step through and back                        "

## 2024-04-26 ENCOUNTER — OFFICE VISIT (OUTPATIENT)
Dept: PHYSICAL THERAPY | Facility: CLINIC | Age: 54
End: 2024-04-26
Payer: COMMERCIAL

## 2024-04-26 DIAGNOSIS — M25.562 ACUTE PAIN OF LEFT KNEE: ICD-10-CM

## 2024-04-26 DIAGNOSIS — Z98.890 STATUS POST ARTHROSCOPIC PARTIAL MEDIAL MENISCECTOMY: ICD-10-CM

## 2024-04-26 DIAGNOSIS — S83.412D SPRAIN OF MEDIAL COLLATERAL LIGAMENT OF LEFT KNEE, SUBSEQUENT ENCOUNTER: Primary | ICD-10-CM

## 2024-04-26 PROCEDURE — 97110 THERAPEUTIC EXERCISES: CPT

## 2024-04-26 NOTE — PROGRESS NOTES
"Daily Note     Today's date: 2024  Patient name: Leslee Stephenson  : 1970  MRN: [id number]  Referring provider: Elisabet Story PA-C  Dx:   Encounter Diagnosis     ICD-10-CM    1. Sprain of medial collateral ligament of left knee, subsequent encounter  S83.412D       2. Acute pain of left knee  M25.562       3. Status post arthroscopic partial medial meniscectomy  Z98.890                      Subjective: Patient reports that she went for a walk after last session and had some significant soreness that night.      Objective: See treatment diary below      Assessment: Tolerated treatment well. Continued with established POC which patient tolerated well. Patient reported slight \"pulling\" in Lt knee during knee flexion stretch along the medial aspect of her knee. Trialed repeated ER which did not abolish these symptoms, but modifying knee flexion stretch to include slight tibial ER reduced strain and improved knee flexion stretch. Updated HEP accordingly to test over the weekend. Patient would benefit from continued PT to address functional mobility deficits and return to PLOF.       Plan: Continue per POC. Advance per primary PT.       Insurance:  AMA/CMS Eval/ Re-eval POC expires FOTO Auth #/ Referral # Total    Start date  Expiration date Extension  Visit limitation?  PT only or  PT+OT? Co-Insurance   CMS 24 4  Auth approved    12 visits     CMS 24              AUTH #:  Date  2.21 2.29 3.7 3.11 3.14 3.19 3.21 4.4 4.9 4.11 4.15   Authed: Used 1 2 3 4 5 6 7 8 9 10 11 12    Remaining  11 10 9 8 7 6 5 4 3 2 1 0     AUTH #:  Date               Authed: Used 1               Remaining  11                Physical Therapy Protocol - Partial Meniscectomy     Wound Care: Keep the dressing clean and dry.  Light drainage may occur the first 2 days postop.   You may remove the dressings and get the incision wet in the shower 72 hours after surgery.  DO NOT remove steri-strips or " sutures.  DO NOT immerse the incision under water.  Carefully pat the incision dry.  If there is wound drainage, re-apply a fresh dry gauze dressing.     PHASE I (Weeks 0 - 2):   · Goals: decrease edema, activate quadriceps   · Weightbearing: As tolerated; use crutches as needed. Discontinue when gait normal   · Brace: none required   · Range of Motion: AAROM ? AROM as tolerated   · Therapeutic Exercises: Patellar mobs, quad/hamstring sets, heel slides, step-ups, straight-leg raises, stationary bike as tolerated; core exercises   · Modalities: Per therapist, including electrical stimulation, ultrasound, heat (before), ice (after)     Phase II (Weeks 2 - 4)   · Weightbearing: As tolerated   · Brace: None   · Range of Motion: Full   · Therapeutic Exercises: Progress Phase I exercises; lunges, wall-sits; add cycling and elliptical   · Modalities: Per therapist, including electrical stimulation, ultrasound, heat (before), ice (after)     Phase III (Weeks 4 - 6)   · Weightbearing: As tolerated   · Brace: None   · Range of Motion: Full   · Therapeutic Exercises: Progress Phase II exercises; add plyometrics and sport or work specific exercises; add running if patient wishes   · Modalities: Per therapist, including electrical stimulation, ultrasound, heat (before), ice (after)     Precautions: standard practice  Patient provided verbal consent to treatment plan and recommended interventions.    Access Code: [email or web address]  URL: [email or web address]  Date: 04/04/2024  Prepared by: Jose Manuel Navarro    Exercises  - Supine Calf Stretch with Strap  - 3 x daily - 7 x weekly - 1 sets - 10 reps - 10 hold  - Supine Quad Set  - 3 x daily - 7 x weekly - 3 sets - 10 reps  - Seated Heel Slide  - 3 x daily - 7 x weekly - 3 sets - 10 reps  - Supine Heel Slide with Strap  - 1 x daily - 7 x weekly - 3 sets - 10 reps  - Supine Gluteal Sets  - 1 x daily - 7 x weekly - 3 sets - 10 reps    Manuals 4.9.24 4.11.24 4.15.24 4.24 4/26/24   visit  "10 11 12 13 14                           STM FB quad FB quad      Neuro Re-Ed                                                Ther Ex        Supine calf stretch 5*10'' HEP  Stand calf stretch 10*10''    SAQ 3*10 3*10 3*10, 3\" hold     Nu step- st  9' lvl 1 Rec bike 1/2 rev 6' rec bike 1/2 to full rev. 8' rec bike full rev RB x 8 min   Step up 2*10, 4\" LLE 3*10, 4'' LLE 2*10, 6\" LLE 3*10, 6\" LLE    Calf raise  3*10 3*10 HEP    SLR flexion  3*8 Pt assist 3*8 Pt assist 3*10 3*10   Pball roll in   3*10 3*10 3*10 3*10   Stand hip abd   3*10 RLE moving  3x10 sidelying   Stand knee flexion on step   3*10, 3\" hold 12\" step  3*10, 3\" hold 12\" step - in tibia ER   Mini squats    3*10 3*10   Leg press    3*10, 135# 3*10, 135#   Uni leg press    3*12, 55# 3*12, 55#                                   Ther Activity        Pt edu                Gait Training        SPC training  3', step through and back                         "

## 2024-04-30 ENCOUNTER — OFFICE VISIT (OUTPATIENT)
Dept: PHYSICAL THERAPY | Facility: CLINIC | Age: 54
End: 2024-04-30
Payer: COMMERCIAL

## 2024-04-30 DIAGNOSIS — S83.412D SPRAIN OF MEDIAL COLLATERAL LIGAMENT OF LEFT KNEE, SUBSEQUENT ENCOUNTER: Primary | ICD-10-CM

## 2024-04-30 DIAGNOSIS — Z98.890 STATUS POST ARTHROSCOPIC PARTIAL MEDIAL MENISCECTOMY: ICD-10-CM

## 2024-04-30 DIAGNOSIS — M25.562 ACUTE PAIN OF LEFT KNEE: ICD-10-CM

## 2024-04-30 PROCEDURE — 97110 THERAPEUTIC EXERCISES: CPT | Performed by: PHYSICAL THERAPIST

## 2024-04-30 NOTE — PROGRESS NOTES
Daily Note     Today's date: 2024  Patient name: Leslee Stephenson  : 1970  MRN: [id number]  Referring provider: Elisabet Story PA-C  Dx:   Encounter Diagnosis     ICD-10-CM    1. Sprain of medial collateral ligament of left knee, subsequent encounter  S83.412D       2. Status post arthroscopic partial medial meniscectomy  Z98.890       3. Acute pain of left knee  M25.562                Subjective: Patient reports that she is doing okay entering treatment. Tried kneeling on knee which irritated it. Reports sleeping is still slightly irritated if she overdoes things.     Objective: See treatment diary below    Assessment: Tolerated treatment well. Patient reported feeling quad tightness with treatment progression today. Improved strength noted with progression of weight with leg press exercise.     Plan: Continue strengthening per patient's tolerance.        Insurance:  AMA/CMS Eval/ Re-eval POC expires FOTO Auth #/ Referral # Total    Start date  Expiration date Extension  Visit limitation?  PT only or  PT+OT? Co-Insurance   CMS 24 4.9.24  Auth approved    12 visits     CMS 4.24 5.30.24              AUTH #:  Date  2.21 2.29 3.7 3.11 3.14 3.19 3.21 4.4 4.9 4.11 4.15   Authed: Used 1 2 3 4 5 6 7 8 9 10 11 12    Remaining  11 10 9 8 7 6 5 4 3 2 1 0     AUTH #:  Date  426 4.30            Authed: Used 1 2 3             Remaining  11 10 9              Physical Therapy Protocol - Partial Meniscectomy     Wound Care: Keep the dressing clean and dry.  Light drainage may occur the first 2 days postop.   You may remove the dressings and get the incision wet in the shower 72 hours after surgery.  DO NOT remove steri-strips or sutures.  DO NOT immerse the incision under water.  Carefully pat the incision dry.  If there is wound drainage, re-apply a fresh dry gauze dressing.     PHASE I (Weeks 0 - 2):   · Goals: decrease edema, activate quadriceps   · Weightbearing: As tolerated; use crutches as  needed. Discontinue when gait normal   · Brace: none required   · Range of Motion: AAROM ? AROM as tolerated   · Therapeutic Exercises: Patellar mobs, quad/hamstring sets, heel slides, step-ups, straight-leg raises, stationary bike as tolerated; core exercises   · Modalities: Per therapist, including electrical stimulation, ultrasound, heat (before), ice (after)     Phase II (Weeks 2 - 4)   · Weightbearing: As tolerated   · Brace: None   · Range of Motion: Full   · Therapeutic Exercises: Progress Phase I exercises; lunges, wall-sits; add cycling and elliptical   · Modalities: Per therapist, including electrical stimulation, ultrasound, heat (before), ice (after)     Phase III (Weeks 4 - 6)   · Weightbearing: As tolerated   · Brace: None   · Range of Motion: Full   · Therapeutic Exercises: Progress Phase II exercises; add plyometrics and sport or work specific exercises; add running if patient wishes   · Modalities: Per therapist, including electrical stimulation, ultrasound, heat (before), ice (after)     Precautions: standard practice  Patient provided verbal consent to treatment plan and recommended interventions.    Access Code: [email or web address]  URL: [email or web address]  Date: 04/04/2024  Prepared by: Jose Manuel Navarro    Exercises  - Supine Calf Stretch with Strap  - 3 x daily - 7 x weekly - 1 sets - 10 reps - 10 hold  - Supine Quad Set  - 3 x daily - 7 x weekly - 3 sets - 10 reps  - Seated Heel Slide  - 3 x daily - 7 x weekly - 3 sets - 10 reps  - Supine Heel Slide with Strap  - 1 x daily - 7 x weekly - 3 sets - 10 reps  - Supine Gluteal Sets  - 1 x daily - 7 x weekly - 3 sets - 10 reps  - Calf raises - 3*10     Manuals 4.11.24 4.15.24 4.24 4/26/24 4/30/24   visit 11 12 13 14 15                           [facility] FB quad       Neuro Re-Ed                                Ther Ex        Supine calf stretch HEP  Stand calf stretch 10*10''     Nu step- st  Rec bike 1/2 rev 6' rec bike 1/2 to full rev. 8' rec bike full  "rev RB x 8 min 8' lvl 2   Step up 3*10, 4'' LLE 2*10, 6\" LLE 3*10, 6\" LLE     SLR flexion 3*8 Pt assist 3*8 Pt assist 3*10 3*10 HEP   Pball roll in  3*10 3*10 3*10 3*10 3*10   Stand hip abd  3*10 RLE moving  3x10 sidelying 3*10 s/l   Stand knee flexion on step  3*10, 3\" hold 12\" step  3*10, 3\" hold 12\" step - in tibia ER HEP   Mini squats   3*10 3*10 TRX 3*10   Leg press   3*10, 135# 3*10, 135# 3*10-12, 135, 155, 185#   Uni leg press   3*12, 55# 3*12, 55# 3*12, 95#   Uni deadlift     3*10, 9# KB   Lateral step up     3*10, 6\"   Side step     2*3 laps, 12' Yellow loop   Prone quad stretch     5*15'' PT assist                           Ther Activity        Pt edu                Gait Training        SPC training 3', step through and back                          "

## 2024-05-02 ENCOUNTER — OFFICE VISIT (OUTPATIENT)
Dept: PHYSICAL THERAPY | Facility: CLINIC | Age: 54
End: 2024-05-02
Payer: COMMERCIAL

## 2024-05-02 DIAGNOSIS — Z98.890 STATUS POST ARTHROSCOPIC PARTIAL MEDIAL MENISCECTOMY: ICD-10-CM

## 2024-05-02 DIAGNOSIS — S83.412D SPRAIN OF MEDIAL COLLATERAL LIGAMENT OF LEFT KNEE, SUBSEQUENT ENCOUNTER: Primary | ICD-10-CM

## 2024-05-02 DIAGNOSIS — M25.562 ACUTE PAIN OF LEFT KNEE: ICD-10-CM

## 2024-05-02 PROCEDURE — 97110 THERAPEUTIC EXERCISES: CPT | Performed by: PHYSICAL THERAPIST

## 2024-05-02 NOTE — PROGRESS NOTES
Daily Note     Today's date: 2024  Patient name: Leslee Stephenson  : 1970  MRN: [id number]  Referring provider: Elisabet Story PA-C  Dx:   Encounter Diagnosis     ICD-10-CM    1. Sprain of medial collateral ligament of left knee, subsequent encounter  S83.412D       2. Acute pain of left knee  M25.562       3. Status post arthroscopic partial medial meniscectomy  Z98.890                Subjective: Patient reports that her leg was sore after last session for about 1 day. Went for a walk and wore brace which helped with knee discomfort.  1 on 1 with PT for 23 minutes. Remaining time independent fitness program.    Objective: See treatment diary below    Assessment: Tolerated treatment well. Patient reported feeling good with session rendered today. LE fatigue reported with SLR in long sit position.     Plan: Continue strengthening per patient's tolerance.        Insurance:  A/CMS Eval/ Re-eval POC expires FOTO Auth #/ Referral # Total    Start date  Expiration date Extension  Visit limitation?  PT only or  PT+OT? Co-Insurance   CMS ..24 4.9.24  Auth approved    12 visits     CMS 4.4.24 5.30.24              AUTH #:  Date  2.21 2.29 3.7 3.11 3.14 3.19 3.21 4.4 4.9 4.11 4.15   Authed: Used 1 2 3 4 5 6 7 8 9 10 11 12    Remaining  11 10 9 8 7 6 5 4 3 2 1 0     AUTH #:  Date  4.26 4.30            Authed: Used 1 2 3             Remaining  11 10 9              Physical Therapy Protocol - Partial Meniscectomy     Wound Care: Keep the dressing clean and dry.  Light drainage may occur the first 2 days postop.   You may remove the dressings and get the incision wet in the shower 72 hours after surgery.  DO NOT remove steri-strips or sutures.  DO NOT immerse the incision under water.  Carefully pat the incision dry.  If there is wound drainage, re-apply a fresh dry gauze dressing.     PHASE I (Weeks 0 - 2):   · Goals: decrease edema, activate quadriceps   · Weightbearing: As tolerated; use crutches  as needed. Discontinue when gait normal   · Brace: none required   · Range of Motion: AAROM ? AROM as tolerated   · Therapeutic Exercises: Patellar mobs, quad/hamstring sets, heel slides, step-ups, straight-leg raises, stationary bike as tolerated; core exercises   · Modalities: Per therapist, including electrical stimulation, ultrasound, heat (before), ice (after)     Phase II (Weeks 2 - 4)   · Weightbearing: As tolerated   · Brace: None   · Range of Motion: Full   · Therapeutic Exercises: Progress Phase I exercises; lunges, wall-sits; add cycling and elliptical   · Modalities: Per therapist, including electrical stimulation, ultrasound, heat (before), ice (after)     Phase III (Weeks 4 - 6)   · Weightbearing: As tolerated   · Brace: None   · Range of Motion: Full   · Therapeutic Exercises: Progress Phase II exercises; add plyometrics and sport or work specific exercises; add running if patient wishes   · Modalities: Per therapist, including electrical stimulation, ultrasound, heat (before), ice (after)     Precautions: standard practice  Patient provided verbal consent to treatment plan and recommended interventions.    Access Code: [email or web address]  URL: [email or web address]  Date: 04/04/2024  Prepared by: Jose Manuel Navarro    Exercises  - Supine Calf Stretch with Strap  - 3 x daily - 7 x weekly - 1 sets - 10 reps - 10 hold  - Supine Quad Set  - 3 x daily - 7 x weekly - 3 sets - 10 reps  - Seated Heel Slide  - 3 x daily - 7 x weekly - 3 sets - 10 reps  - Supine Heel Slide with Strap  - 1 x daily - 7 x weekly - 3 sets - 10 reps  - Supine Gluteal Sets  - 1 x daily - 7 x weekly - 3 sets - 10 reps  - Calf raises - 3*10     Manuals 4.15.24 4.24 4/26/24 4/30/24 5/2/24   visit 12 13 14 15 16                   Neuro Re-Ed                                Ther Ex        Supine calf stretch  Stand calf stretch 10*10''      Nu step- st  6' rec bike 1/2 to full rev. 8' rec bike full rev RB x 8 min 8' lvl 2 RB 8', lvl 4   Step  "up 2*10, 6\" LLE 3*10, 6\" LLE      SLR flexion 3*8 Pt assist 3*10 3*10 HEP    Pball roll in  3*10 3*10 3*10 3*10 3*10   Stand hip abd 3*10 RLE moving  3x10 sidelying 3*10 s/l 3*10 S/L   Stand knee flexion on step 3*10, 3\" hold 12\" step  3*10, 3\" hold 12\" step - in tibia ER HEP    Mini squats  3*10 3*10 TRX 3*10    Leg press  3*10, 135# 3*10, 135# 3*10-12, 135, 155, 185# 3*12, 165-185#   Uni leg press  3*12, 55# 3*12, 55# 3*12, 95# 3*12, 105#   Uni deadlift    3*10, 9# KB 3*12, 9# KB   Lateral step up    3*10, 6\"    Side step    2*3 laps, 12' Yellow loop 2*3 laps, 12' Yellow loop   Prone quad stretch    5*15'' PT assist IASTM quad stick rolling                           Ther Activity        Pt edu                Gait Training        SPC training                           "

## 2024-05-07 ENCOUNTER — TELEPHONE (OUTPATIENT)
Age: 54
End: 2024-05-07

## 2024-05-07 ENCOUNTER — OFFICE VISIT (OUTPATIENT)
Dept: PHYSICAL THERAPY | Facility: CLINIC | Age: 54
End: 2024-05-07
Payer: COMMERCIAL

## 2024-05-07 DIAGNOSIS — S83.412D SPRAIN OF MEDIAL COLLATERAL LIGAMENT OF LEFT KNEE, SUBSEQUENT ENCOUNTER: Primary | ICD-10-CM

## 2024-05-07 DIAGNOSIS — Z98.890 STATUS POST ARTHROSCOPIC PARTIAL MEDIAL MENISCECTOMY: ICD-10-CM

## 2024-05-07 DIAGNOSIS — M25.562 ACUTE PAIN OF LEFT KNEE: ICD-10-CM

## 2024-05-07 PROCEDURE — 97110 THERAPEUTIC EXERCISES: CPT | Performed by: PHYSICAL THERAPIST

## 2024-05-07 NOTE — PROGRESS NOTES
Daily Note     Today's date: 2024  Patient name: Leslee Stephenson  : 1970  MRN: [id number]  Referring provider: Elisabet Story PA-C  Dx:   Encounter Diagnosis     ICD-10-CM    1. Sprain of medial collateral ligament of left knee, subsequent encounter  S83.412D       2. Status post arthroscopic partial medial meniscectomy  Z98.890       3. Acute pain of left knee  M25.562                Subjective: Patient reports that stair navigation is improving but continues to have medial knee discomfort when walking for long periods of time.     Objective: See treatment diary below    Assessment: Tolerated treatment well. Patient reported feeling okay with most exercises today. Knee discomfort reported when trying to progress lateral movements to unstable surface on BOSU ball.      Plan: Continue strengthening per patient's tolerance.        Insurance:  [employer]/CMS Eval/ Re-eval POC expires FOTO Auth #/ Referral # Total    Start date  Expiration date Extension  Visit limitation?  PT only or  PT+OT? Co-Insurance   CMS .. 4.9.24  Auth approved    12 visits     CMS 4..24 5.30.24              AUTH #:  Date  2.21 2.29 3.7 3.11 3.14 3.19 3.21 4.4 4.9 4.11 4.15   Authed: Used 1 2 3 4 5 6 7 8 9 10 11 12    Remaining  11 10 9 8 7 6 5 4 3 2 1 0     AUTH #:  Date  4.26 4.30 5.2 5.7          Authed: Used 1 2 3 4 5           Remaining  11 10 9 8 7            Physical Therapy Protocol - Partial Meniscectomy     Wound Care: Keep the dressing clean and dry.  Light drainage may occur the first 2 days postop.   You may remove the dressings and get the incision wet in the shower 72 hours after surgery.  DO NOT remove steri-strips or sutures.  DO NOT immerse the incision under water.  Carefully pat the incision dry.  If there is wound drainage, re-apply a fresh dry gauze dressing.     PHASE I (Weeks 0 - 2):   · Goals: decrease edema, activate quadriceps   · Weightbearing: As tolerated; use crutches as needed. Discontinue  "when gait normal   · Brace: none required   · Range of Motion: AAROM ? AROM as tolerated   · Therapeutic Exercises: Patellar mobs, quad/hamstring sets, heel slides, step-ups, straight-leg raises, stationary bike as tolerated; core exercises   · Modalities: Per therapist, including electrical stimulation, ultrasound, heat (before), ice (after)     Phase II (Weeks 2 - 4)   · Weightbearing: As tolerated   · Brace: None   · Range of Motion: Full   · Therapeutic Exercises: Progress Phase I exercises; lunges, wall-sits; add cycling and elliptical   · Modalities: Per therapist, including electrical stimulation, ultrasound, heat (before), ice (after)     Phase III (Weeks 4 - 6)   · Weightbearing: As tolerated   · Brace: None   · Range of Motion: Full   · Therapeutic Exercises: Progress Phase II exercises; add plyometrics and sport or work specific exercises; add running if patient wishes   · Modalities: Per therapist, including electrical stimulation, ultrasound, heat (before), ice (after)     Precautions: standard practice  Patient provided verbal consent to treatment plan and recommended interventions.    Access Code: [email or web address]  URL: [email or web address]  Date: 04/04/2024  Prepared by: Jose Manuel Navarro    Exercises  - Supine Calf Stretch with Strap  - 3 x daily - 7 x weekly - 1 sets - 10 reps - 10 hold  - Supine Quad Set  - 3 x daily - 7 x weekly - 3 sets - 10 reps  - Seated Heel Slide  - 3 x daily - 7 x weekly - 3 sets - 10 reps  - Supine Heel Slide with Strap  - 1 x daily - 7 x weekly - 3 sets - 10 reps  - Supine Gluteal Sets  - 1 x daily - 7 x weekly - 3 sets - 10 reps  - Calf raises - 3*10     Manuals 4.24 4/26/24 4/30/24 5/2/24 5/7/24   visit 13 14 15 16 17                   Neuro Re-Ed                                Ther Ex        Nu step- st  8' rec bike full rev RB x 8 min 8' lvl 2 RB 8', lvl 4 RB 8', lvl 4   Step up 3*10, 6\" LLE       Pball roll in  3*10 3*10 3*10 3*10    Stand hip abd  3x10 sidelying " "3*10 s/l 3*10 S/L    Stand knee flexion on step  3*10, 3\" hold 12\" step - in tibia ER HEP     Mini squats 3*10 3*10 TRX 3*10     Leg press 3*10, 135# 3*10, 135# 3*10-12, 135, 155, 185# 3*12, 165-185# 3*12, 205#   Uni leg press 3*12, 55# 3*12, 55# 3*12, 95# 3*12, 105# 3*12, 115#   Uni deadlift   3*10, 9# KB 3*12, 9# KB 3*10, 25# KB   Lateral step up   3*10, 6\"  3*10, 6\"   Side step   2*3 laps, 12' Yellow loop 2*3 laps, 12' Yellow loop    Prone quad stretch   5*15'' PT assist IASTM quad stick rolling    TRX lunge     3*10   Ant. Step up BOSU     3*10   l                                Ther Activity        Pt edu                Gait Training        SPC training                           "

## 2024-05-07 NOTE — TELEPHONE ENCOUNTER
Debbie: Leslee     Doctor/Office: [facility] / [facility]     #: [phone]    Work or school note: Would like to return to work tomorrow    Return to work/school date: 5/8/2024    Is there any restrictions that need to be updated? If so, what? NA

## 2024-05-09 ENCOUNTER — OFFICE VISIT (OUTPATIENT)
Dept: PHYSICAL THERAPY | Facility: CLINIC | Age: 54
End: 2024-05-09
Payer: COMMERCIAL

## 2024-05-09 DIAGNOSIS — M25.562 ACUTE PAIN OF LEFT KNEE: ICD-10-CM

## 2024-05-09 DIAGNOSIS — Z98.890 STATUS POST ARTHROSCOPIC PARTIAL MEDIAL MENISCECTOMY: Primary | ICD-10-CM

## 2024-05-09 DIAGNOSIS — S83.412D SPRAIN OF MEDIAL COLLATERAL LIGAMENT OF LEFT KNEE, SUBSEQUENT ENCOUNTER: ICD-10-CM

## 2024-05-09 PROCEDURE — 97112 NEUROMUSCULAR REEDUCATION: CPT

## 2024-05-09 PROCEDURE — 97110 THERAPEUTIC EXERCISES: CPT

## 2024-05-09 NOTE — PROGRESS NOTES
Daily Note     Today's date: 2024  Patient name: Leslee Stephenson  : 1970  MRN: [id number]  Referring provider: Elisabet Story PA-C  Dx:   Encounter Diagnosis     ICD-10-CM    1. Status post arthroscopic partial medial meniscectomy  Z98.890       2. Acute pain of left knee  M25.562       3. Sprain of medial collateral ligament of left knee, subsequent encounter  S83.412D                      Subjective: Patient reports her knee is feeling good this morning and that she is not having much knee pain. Patient reports she has been walking around 2 miles per day while wearing her brace and she has been getting some medial knee pain during her walks mostly during downhill portions. Patient states that the pain remains for a short period after her walk before dissapaiting.       Objective: See treatment diary below; Patient was co-treated with GRAYSON Bess, under my direct supervision.        Assessment: Tolerated treatment well. Patient  was able to increase her resistance on leg press while maintaining good form and with no self reports of knee pain.  Patient demonstrated good control with forward lunges into the bosu ball maintaining knee stability and not allowing the knee to go into valgus. Patient reports she felt stable doing lateral lunges with TRX straps and she reported no increase in knee pain with this movement.       Plan: Continue per plan of care.      Insurance:  AMA/CMS Eval/ Re-eval POC expires FOTO Auth #/ Referral # Total    Start date  Expiration date Extension  Visit limitation?  PT only or  PT+OT? Co-Insurance   CMS 24 4.9.24  Auth approved    12 visits     CMS 24 5.30.24              AUTH #:  Date  2. 2.29 3.7 3.11 3.14 3.19 3.21 4.4 4.9 4.11 4.15   Authed: Used 1 2 3 4 5 6 7 8 9 10 11 12    Remaining  11 10 9 8 7 6 5 4 3 2 1 0     AUTH #:  Date  4. 4.30 5.2 5.7 5.9         Authed: Used 1 2 3 4 5 6          Remaining  11 10 9 8 7 6           Physical  Therapy Protocol - Partial Meniscectomy     Wound Care: Keep the dressing clean and dry.  Light drainage may occur the first 2 days postop.   You may remove the dressings and get the incision wet in the shower 72 hours after surgery.  DO NOT remove steri-strips or sutures.  DO NOT immerse the incision under water.  Carefully pat the incision dry.  If there is wound drainage, re-apply a fresh dry gauze dressing.     PHASE I (Weeks 0 - 2):   · Goals: decrease edema, activate quadriceps   · Weightbearing: As tolerated; use crutches as needed. Discontinue when gait normal   · Brace: none required   · Range of Motion: AAROM ? AROM as tolerated   · Therapeutic Exercises: Patellar mobs, quad/hamstring sets, heel slides, step-ups, straight-leg raises, stationary bike as tolerated; core exercises   · Modalities: Per therapist, including electrical stimulation, ultrasound, heat (before), ice (after)     Phase II (Weeks 2 - 4)   · Weightbearing: As tolerated   · Brace: None   · Range of Motion: Full   · Therapeutic Exercises: Progress Phase I exercises; lunges, wall-sits; add cycling and elliptical   · Modalities: Per therapist, including electrical stimulation, ultrasound, heat (before), ice (after)     Phase III (Weeks 4 - 6)   · Weightbearing: As tolerated   · Brace: None   · Range of Motion: Full   · Therapeutic Exercises: Progress Phase II exercises; add plyometrics and sport or work specific exercises; add running if patient wishes   · Modalities: Per therapist, including electrical stimulation, ultrasound, heat (before), ice (after)     Precautions: standard practice  Patient provided verbal consent to treatment plan and recommended interventions.    Access Code: [email or web address]  URL: [email or web address]  Date: 04/04/2024  Prepared by: Jose Manuel Navarro    Exercises  - Supine Calf Stretch with Strap  - 3 x daily - 7 x weekly - 1 sets - 10 reps - 10 hold  - Supine Quad Set  - 3 x daily - 7 x weekly - 3 sets - 10  "reps  - Seated Heel Slide  - 3 x daily - 7 x weekly - 3 sets - 10 reps  - Supine Heel Slide with Strap  - 1 x daily - 7 x weekly - 3 sets - 10 reps  - Supine Gluteal Sets  - 1 x daily - 7 x weekly - 3 sets - 10 reps  - Calf raises - 3*10     Manuals 4.24 4/26/24 4/30/24 5/2/24 5/7/24 5/9/2024     visit 13 14 15 16 17 18                     Neuro Re-Ed                                    Ther Ex         Nu step- st  8' rec bike full rev RB x 8 min 8' lvl 2 RB 8', lvl 4 RB 8', lvl 4 RB 8' LVL 4   Step up 3*10, 6\" LLE        Pball roll in  3*10 3*10 3*10 3*10     Stand hip abd  3x10 sidelying 3*10 s/l 3*10 S/L     Stand knee flexion on step  3*10, 3\" hold 12\" step - in tibia ER HEP      Mini squats 3*10 3*10 TRX 3*10      Leg press 3*10, 135# 3*10, 135# 3*10-12, 135, 155, 185# 3*12, 165-185# 3*12, 205# 1x12 205#  2x12 225#   Uni leg press 3*12, 55# 3*12, 55# 3*12, 95# 3*12, 105# 3*12, 115# 3x12 115#   Uni deadlift   3*10, 9# KB 3*12, 9# KB 3*10, 25# KB 3x10 12kg KB B/L   Lateral step up   3*10, 6\"  3*10, 6\" 3x10 9\" B/L   Side step   2*3 laps, 12' Yellow loop 2*3 laps, 12' Yellow loop     Prone quad stretch   5*15'' PT assist IASTM quad stick rolling     TRX lunge     3*10    Ant. Step up BOSU     3*10    Lateral lunge with TRX      3x15 alternating sides   Forward lunge onto bosu      3x15 alternating legs                     Ther Activity         Pt edu                  Gait Training         SPC training                               "

## 2024-05-12 DIAGNOSIS — Z87.828 S/P ARTHROSCOPIC PARTIAL MEDIAL MENISCECTOMY OF LEFT KNEE: ICD-10-CM

## 2024-05-12 DIAGNOSIS — Z98.890 S/P ARTHROSCOPIC PARTIAL MEDIAL MENISCECTOMY OF LEFT KNEE: ICD-10-CM

## 2024-05-12 DIAGNOSIS — Z47.89 AFTERCARE FOLLOWING SURGERY OF THE MUSCULOSKELETAL SYSTEM: ICD-10-CM

## 2024-05-13 ENCOUNTER — OFFICE VISIT (OUTPATIENT)
Dept: OBGYN CLINIC | Facility: CLINIC | Age: 54
End: 2024-05-13

## 2024-05-13 VITALS
SYSTOLIC BLOOD PRESSURE: 118 MMHG | DIASTOLIC BLOOD PRESSURE: 78 MMHG | WEIGHT: 203 LBS | BODY MASS INDEX: 31.86 KG/M2 | HEIGHT: 67 IN | HEART RATE: 76 BPM

## 2024-05-13 DIAGNOSIS — Z47.89 AFTERCARE FOLLOWING SURGERY OF THE MUSCULOSKELETAL SYSTEM: ICD-10-CM

## 2024-05-13 DIAGNOSIS — Z87.828 S/P ARTHROSCOPIC PARTIAL MEDIAL MENISCECTOMY OF LEFT KNEE: Primary | ICD-10-CM

## 2024-05-13 DIAGNOSIS — Z98.890 S/P ARTHROSCOPIC PARTIAL MEDIAL MENISCECTOMY OF LEFT KNEE: Primary | ICD-10-CM

## 2024-05-13 PROCEDURE — 99024 POSTOP FOLLOW-UP VISIT: CPT | Performed by: ORTHOPAEDIC SURGERY

## 2024-05-13 NOTE — PROGRESS NOTES
SUBJECTIVE:  Patient is a 54 y.o. year old female who presents for follow up now 5.5 weeks s/p Knee Arthroscopic partial medial meniscectomy - Left performed on  4/3/2024.  Today, the patient reports she is doing well and continues to gradually increase her walking distance. She notes occasional medial knee pain and swelling during/after walking, but these symptoms resolve quickly. Leslee is bracing while walking, which seems to help her symptoms as well. She returned to work without any issues. The patient continues to attend PT sessions consistently and notes this has been greatly beneficial with helping regain strength. The patient denies new injury/trauma, instability, or numbness/tingling. She completed her course of ASA 81 mg BID and continues to take 81 mg once daily as she is on estrogen and was using this at baseline.      VITALS:  Vitals:    05/13/24 0941   BP: 118/78   Pulse: 76       PHYSICAL EXAMINATION:  General: well developed and well nourished, alert, oriented times 3, and appears comfortable  Psychiatric: Normal    MUSCULOSKELETAL EXAMINATION:    Left Lower Extremity   Incision: well-healed  No significant joint effusion  Range of Motion: 0-120+ without pain  No significant joint line tenderness  Strength: 5/5 knee flexion/extension  +EHL/FHL/TA/GS  SILT throughout  Palpable DP pulse       PLAN:    I believe the patient is continuing to progress appropriately. Given her menisectomy and the arthritis previously established in her knee, we discussed that she may feel some knee pain from time to time. However, we encouraged her to remain as active as possible, performing activities as tolerated. Continue physical therapy per provided protocol with focus on quad strengthening. She can continue wearing her knee brace as needed. Use OTC medications as needed for pain control. Follow up in 6 weeks for 12-week post-op visit.

## 2024-05-14 ENCOUNTER — APPOINTMENT (OUTPATIENT)
Dept: PHYSICAL THERAPY | Facility: CLINIC | Age: 54
End: 2024-05-14
Payer: COMMERCIAL

## 2024-05-14 RX ORDER — ASPIRIN 81 MG
TABLET,CHEWABLE ORAL
Qty: 84 TABLET | Refills: 0 | Status: SHIPPED | OUTPATIENT
Start: 2024-05-14

## 2024-05-17 ENCOUNTER — OFFICE VISIT (OUTPATIENT)
Dept: PHYSICAL THERAPY | Facility: CLINIC | Age: 54
End: 2024-05-17
Payer: COMMERCIAL

## 2024-05-17 DIAGNOSIS — S83.412D SPRAIN OF MEDIAL COLLATERAL LIGAMENT OF LEFT KNEE, SUBSEQUENT ENCOUNTER: ICD-10-CM

## 2024-05-17 DIAGNOSIS — M25.562 ACUTE PAIN OF LEFT KNEE: ICD-10-CM

## 2024-05-17 DIAGNOSIS — Z98.890 STATUS POST ARTHROSCOPIC PARTIAL MEDIAL MENISCECTOMY: Primary | ICD-10-CM

## 2024-05-17 PROCEDURE — 97112 NEUROMUSCULAR REEDUCATION: CPT | Performed by: PHYSICAL THERAPIST

## 2024-05-17 PROCEDURE — 97110 THERAPEUTIC EXERCISES: CPT | Performed by: PHYSICAL THERAPIST

## 2024-05-17 NOTE — PROGRESS NOTES
Daily Note         Today's date: 2024  Patient name: Leslee Stephenson  : 1970  MRN: [id number]  Referring provider: Elisabet Story PA-C  Dx:   Encounter Diagnosis     ICD-10-CM    1. Status post arthroscopic partial medial meniscectomy  Z98.890       2. Acute pain of left knee  M25.562       3. Sprain of medial collateral ligament of left knee, subsequent encounter  S83.412D           Subjective: Leslee Stephenson reports some medial knee pain entering today 2/10 with ambulation.        Objective: See treatment diary below    Assessment:   PLOC discussed with primary PT.  . Patient challenged with pod walking to facilitate ankle/knee/hip stability.  Needed close UE support at times to maintain stability. Patient needed cuing to facilitate foot intrinsics to maintain stability with unilateral deadlifts.  Once cued she demonstrated improved stability with much improved overall form. The goal of the current treatment is to address patient's functional limitations as well as objective findings.   Unilateral deadlift was only performed for 2 rounds of 10 due to patient reported a strain I low back at end of set    Plan:  continue as indicated by primary PT.         Insurance:  AMA/CMS Eval/ Re-eval POC expires FOTO Auth #/ Referral # Total    Start date  Expiration date Extension  Visit limitation?  PT only or  PT+OT? Co-Insurance   CMS 24 4.9.24  Auth approved    12 visits     CMS 4.24 5.30.24              AUTH #:  Date  2.21 2.29 3.7 3.11 3.14 3.19 3.21 4.4 4.9 4.11 4.15   Authed: Used 1 2 3 4 5 6 7 8 9 10 11 12    Remaining  11 10 9 8 7 6 5 4 3 2 1 0     AUTH #:  Date  4.26 4.30 5.2 5.7 5.9 24        Authed: Used 1 2 3 4 5 6 7         Remaining  11 10 9 8 7 6 5          Physical Therapy Protocol - Partial Meniscectomy     Wound Care: Keep the dressing clean and dry.  Light drainage may occur the first 2 days postop.   You may remove the dressings and get the incision wet in  the shower 72 hours after surgery.  DO NOT remove steri-strips or sutures.  DO NOT immerse the incision under water.  Carefully pat the incision dry.  If there is wound drainage, re-apply a fresh dry gauze dressing.     PHASE I (Weeks 0 - 2):   · Goals: decrease edema, activate quadriceps   · Weightbearing: As tolerated; use crutches as needed. Discontinue when gait normal   · Brace: none required   · Range of Motion: AAROM ? AROM as tolerated   · Therapeutic Exercises: Patellar mobs, quad/hamstring sets, heel slides, step-ups, straight-leg raises, stationary bike as tolerated; core exercises   · Modalities: Per therapist, including electrical stimulation, ultrasound, heat (before), ice (after)     Phase II (Weeks 2 - 4)   · Weightbearing: As tolerated   · Brace: None   · Range of Motion: Full   · Therapeutic Exercises: Progress Phase I exercises; lunges, wall-sits; add cycling and elliptical   · Modalities: Per therapist, including electrical stimulation, ultrasound, heat (before), ice (after)     Phase III (Weeks 4 - 6)   · Weightbearing: As tolerated   · Brace: None   · Range of Motion: Full   · Therapeutic Exercises: Progress Phase II exercises; add plyometrics and sport or work specific exercises; add running if patient wishes   · Modalities: Per therapist, including electrical stimulation, ultrasound, heat (before), ice (after)     Precautions: standard practice  Patient provided verbal consent to treatment plan and recommended interventions.    Access Code: [email or web address]  URL: [email or web address]  Date: 04/04/2024  Prepared by: Jose Manuel Navarro    Exercises  - Supine Calf Stretch with Strap  - 3 x daily - 7 x weekly - 1 sets - 10 reps - 10 hold  - Supine Quad Set  - 3 x daily - 7 x weekly - 3 sets - 10 reps  - Seated Heel Slide  - 3 x daily - 7 x weekly - 3 sets - 10 reps  - Supine Heel Slide with Strap  - 1 x daily - 7 x weekly - 3 sets - 10 reps  - Supine Gluteal Sets  - 1 x daily - 7 x weekly - 3 sets  "- 10 reps  - Calf raises - 3*10     Manuals 4/30/24 5/2/24 5/7/24 5/9/2024 5/17/24   visit 15 16 17 18 19                   Neuro Re-Ed                                Ther Ex        Nu step- st  8' lvl 2 RB 8', lvl 4 RB 8', lvl 4 RB 8' LVL 4 Rec bike: 5 min lv 5   Step up        Pball roll in  3*10 3*10      Stand hip abd 3*10 s/l 3*10 S/L      Stand knee flexion on step HEP       Mini squats TRX 3*10       Leg press 3*10-12, 135, 155, 185# 3*12, 165-185# 3*12, 205# 1x12 205#  2x12 225# 1x12 205#  2x12 225#   Uni leg press 3*12, 95# 3*12, 105# 3*12, 115# 3x12 115# 115 lb 12 x 3   Uni deadlift 3*10, 9# KB 3*12, 9# KB 3*10, 25# KB 3x10 12kg KB B/L 12 KG 10 x2   Lateral step up 3*10, 6\"  3*10, 6\" 3x10 9\" B/L    Side step 2*3 laps, 12' Yellow loop 2*3 laps, 12' Yellow loop      Prone quad stretch 5*15'' PT assist IASTM quad stick rolling      TRX lunge   3*10     Ant. Step up BOSU   3*10     Lateral lunge with TRX    3x15 alternating sides Lateral lunge: 10 x 2  Reverse lunge: 10 x 2    Forward lunge onto bosu    3x15 alternating legs Forward lunge    Monster walks  Resisted sidestepping      Sidestepping: green loop: 8 steps x 3 rounds  Monster walks: green loop 6-8 steps 3 rounds   Pod walking     6 pods; 4 cycles   Ther Activity        Pt edu                Gait Training        SPC training                                "

## 2024-05-20 ENCOUNTER — TELEPHONE (OUTPATIENT)
Dept: GASTROENTEROLOGY | Facility: CLINIC | Age: 54
End: 2024-05-20

## 2024-05-20 ENCOUNTER — OFFICE VISIT (OUTPATIENT)
Dept: URGENT CARE | Facility: CLINIC | Age: 54
End: 2024-05-20
Payer: COMMERCIAL

## 2024-05-20 ENCOUNTER — APPOINTMENT (OUTPATIENT)
Dept: PHYSICAL THERAPY | Facility: CLINIC | Age: 54
End: 2024-05-20
Payer: COMMERCIAL

## 2024-05-20 VITALS
RESPIRATION RATE: 18 BRPM | DIASTOLIC BLOOD PRESSURE: 75 MMHG | OXYGEN SATURATION: 96 % | WEIGHT: 190 LBS | BODY MASS INDEX: 29.82 KG/M2 | HEIGHT: 67 IN | TEMPERATURE: 97.7 F | SYSTOLIC BLOOD PRESSURE: 110 MMHG | HEART RATE: 90 BPM

## 2024-05-20 DIAGNOSIS — J01.90 ACUTE NON-RECURRENT SINUSITIS, UNSPECIFIED LOCATION: Primary | ICD-10-CM

## 2024-05-20 PROCEDURE — 99203 OFFICE O/P NEW LOW 30 MIN: CPT | Performed by: PHYSICIAN ASSISTANT

## 2024-05-20 RX ORDER — BENZONATATE 200 MG/1
200 CAPSULE ORAL 3 TIMES DAILY PRN
Qty: 20 CAPSULE | Refills: 0 | Status: SHIPPED | OUTPATIENT
Start: 2024-05-20

## 2024-05-20 RX ORDER — AMOXICILLIN AND CLAVULANATE POTASSIUM 875; 125 MG/1; MG/1
1 TABLET, FILM COATED ORAL 2 TIMES DAILY WITH MEALS
Qty: 14 TABLET | Refills: 0 | Status: SHIPPED | OUTPATIENT
Start: 2024-05-20 | End: 2024-05-27
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
## 2024-05-20 NOTE — PROGRESS NOTES
Daily Note     Today's date: 2024  Patient name: Leslee Stephenson  : 1970  MRN: [id number]  Referring provider: Elisabet Story PA-C  Dx: No diagnosis found.               Subjective: Patient reports      Objective: See treatment diary below      Assessment: Tolerated treatment {Tolerated treatment :8886881084}. Patient {assessment:0594089884}      Plan: {PLAN:[id number]}     Insurance:  AMA/CMS Eval/ Re-eval POC expires FOTO Auth #/ Referral # Total    Start date  Expiration date Extension  Visit limitation?  PT only or  PT+OT? Co-Insurance   CMS 2.. 4.9.24  Auth approved    12 visits     CMS 4..24 5.3024              AUTH #:  Date 2 2.21 2.29 3.7 3.11 3.14 3.19 3.21 4.4 4.9 4.11 4.15   Authed: Used 1 2 3 4 5 6 7 8 9 10 11 12    Remaining  11 10 9 8 7 6 5 4 3 2 1 0     AUTH #:  Date  4.26 4.30 5.2 5.7 5.9 24        Authed: Used 1 2 3 4 5 6 7         Remaining  11 10 9 8 7 6 5          Physical Therapy Protocol - Partial Meniscectomy     Wound Care: Keep the dressing clean and dry.  Light drainage may occur the first 2 days postop.   You may remove the dressings and get the incision wet in the shower 72 hours after surgery.  DO NOT remove steri-strips or sutures.  DO NOT immerse the incision under water.  Carefully pat the incision dry.  If there is wound drainage, re-apply a fresh dry gauze dressing.     PHASE I (Weeks 0 - 2):   · Goals: decrease edema, activate quadriceps   · Weightbearing: As tolerated; use crutches as needed. Discontinue when gait normal   · Brace: none required   · Range of Motion: AAROM ? AROM as tolerated   · Therapeutic Exercises: Patellar mobs, quad/hamstring sets, heel slides, step-ups, straight-leg raises, stationary bike as tolerated; core exercises   · Modalities: Per therapist, including electrical stimulation, ultrasound, heat (before), ice (after)     Phase II (Weeks 2 - 4)   · Weightbearing: As tolerated   · Brace: None   · Range of Motion: Full  "  · Therapeutic Exercises: Progress Phase I exercises; lunges, wall-sits; add cycling and elliptical   · Modalities: Per therapist, including electrical stimulation, ultrasound, heat (before), ice (after)     Phase III (Weeks 4 - 6)   · Weightbearing: As tolerated   · Brace: None   · Range of Motion: Full   · Therapeutic Exercises: Progress Phase II exercises; add plyometrics and sport or work specific exercises; add running if patient wishes   · Modalities: Per therapist, including electrical stimulation, ultrasound, heat (before), ice (after)     Precautions: standard practice  Patient provided verbal consent to treatment plan and recommended interventions.    Access Code: [email or web address]  URL: [email or web address]  Date: 04/04/2024  Prepared by: Jose Manuel Navarro    Exercises  - Supine Calf Stretch with Strap  - 3 x daily - 7 x weekly - 1 sets - 10 reps - 10 hold  - Supine Quad Set  - 3 x daily - 7 x weekly - 3 sets - 10 reps  - Seated Heel Slide  - 3 x daily - 7 x weekly - 3 sets - 10 reps  - Supine Heel Slide with Strap  - 1 x daily - 7 x weekly - 3 sets - 10 reps  - Supine Gluteal Sets  - 1 x daily - 7 x weekly - 3 sets - 10 reps  - Calf raises - 3*10     Manuals 4/30/24 5/2/24 5/7/24 5/9/2024 5/17/24 5/20/24   visit 15 16 17 18 19 20                     Neuro Re-Ed                                    Ther Ex         Nu step- st  8' lvl 2 RB 8', lvl 4 RB 8', lvl 4 RB 8' LVL 4 Rec bike: 5 min lv 5 RB lvl 6 6'   Step up         Pball roll in  3*10 3*10       Stand hip abd 3*10 s/l 3*10 S/L       Stand knee flexion on step HEP        Mini squats TRX 3*10        Leg press 3*10-12, 135, 155, 185# 3*12, 165-185# 3*12, 205# 1x12 205#  2x12 225# 1x12 205#  2x12 225#    Uni leg press 3*12, 95# 3*12, 105# 3*12, 115# 3x12 115# 115 lb 12 x 3    Uni deadlift 3*10, 9# KB 3*12, 9# KB 3*10, 25# KB 3x10 12kg KB B/L 12 KG 10 x2    Lateral step up 3*10, 6\"  3*10, 6\" 3x10 9\" B/L     Side step 2*3 laps, 12' Yellow loop 2*3 laps, " 12' Yellow loop       Prone quad stretch 5*15'' PT assist IASTM quad stick rolling       TRX lunge   3*10      Ant. Step up BOSU   3*10      Lateral lunge with TRX    3x15 alternating sides Lateral lunge: 10 x 2  Reverse lunge: 10 x 2     Forward lunge onto bosu    3x15 alternating legs Forward lunge     Monster walks  Resisted sidestepping      Sidestepping: green loop: 8 steps x 3 rounds  Monster walks: green loop 6-8 steps 3 rounds    Pod walking     6 pods; 4 cycles    Ther Activity         Pt edu                  Gait Training         SPC training

## 2024-05-23 ENCOUNTER — OFFICE VISIT (OUTPATIENT)
Dept: PHYSICAL THERAPY | Facility: CLINIC | Age: 54
End: 2024-05-23
Payer: COMMERCIAL

## 2024-05-23 DIAGNOSIS — M25.562 ACUTE PAIN OF LEFT KNEE: ICD-10-CM

## 2024-05-23 DIAGNOSIS — Z98.890 STATUS POST ARTHROSCOPIC PARTIAL MEDIAL MENISCECTOMY: Primary | ICD-10-CM

## 2024-05-23 DIAGNOSIS — S83.412D SPRAIN OF MEDIAL COLLATERAL LIGAMENT OF LEFT KNEE, SUBSEQUENT ENCOUNTER: ICD-10-CM

## 2024-05-23 PROCEDURE — 97110 THERAPEUTIC EXERCISES: CPT | Performed by: PHYSICAL THERAPIST

## 2024-05-23 NOTE — PROGRESS NOTES
Daily Note     Today's date: 2024  Patient name: Leslee Stephenson  : 1970  MRN: [id number]  Referring provider: Elisabet Story PA-C  Dx:   Encounter Diagnosis     ICD-10-CM    1. Status post arthroscopic partial medial meniscectomy  Z98.890       2. Acute pain of left knee  M25.562       3. Sprain of medial collateral ligament of left knee, subsequent encounter  S83.876D                      Subjective: Patient reports good adherence to HEP but developed medial knee sharp pain last night and had trouble sleeping.  1 on 1 with PT for 23 minutes. Remaining time independent fitness program.    Objective: See treatment diary below    Assessment: Tolerated treatment well. Patient  reported medial knee discomfort when trying to perform lateral lunge to right and improved slightly after soleus stretching. No discomfort reported with sagittal plane movements.     Plan: Continue per plan of care.      Insurance:  A/CMS Eval/ Re-eval POC expires FOTO Auth #/ Referral # Total    Start date  Expiration date Extension  Visit limitation?  PT only or  PT+OT? Co-Insurance   CMS .. 4.9.24  Auth approved    12 visits     CMS 4.4.24 5.30.24              AUTH #:  Date  2.21 2.29 3.7 3.11 3.14 3.19 3.21 4.4 4.9 4.11 4.15   Authed: Used 1 2 3 4 5 6 7 8 9 10 11 12    Remaining  11 10 9 8 7 6 5 4 3 2 1 0     AUTH #:  Date  4.26 4.30 5.2 5.7 5.9 24       Authed: Used 1 2 3 4 5 6 7 8        Remaining  11 10 9 8 7 6 5 4         Physical Therapy Protocol - Partial Meniscectomy     Wound Care: Keep the dressing clean and dry.  Light drainage may occur the first 2 days postop.   You may remove the dressings and get the incision wet in the shower 72 hours after surgery.  DO NOT remove steri-strips or sutures.  DO NOT immerse the incision under water.  Carefully pat the incision dry.  If there is wound drainage, re-apply a fresh dry gauze dressing.     PHASE I (Weeks 0 - 2):   · Goals: decrease edema,  activate quadriceps   · Weightbearing: As tolerated; use crutches as needed. Discontinue when gait normal   · Brace: none required   · Range of Motion: AAROM ? AROM as tolerated   · Therapeutic Exercises: Patellar mobs, quad/hamstring sets, heel slides, step-ups, straight-leg raises, stationary bike as tolerated; core exercises   · Modalities: Per therapist, including electrical stimulation, ultrasound, heat (before), ice (after)     Phase II (Weeks 2 - 4)   · Weightbearing: As tolerated   · Brace: None   · Range of Motion: Full   · Therapeutic Exercises: Progress Phase I exercises; lunges, wall-sits; add cycling and elliptical   · Modalities: Per therapist, including electrical stimulation, ultrasound, heat (before), ice (after)     Phase III (Weeks 4 - 6)   · Weightbearing: As tolerated   · Brace: None   · Range of Motion: Full   · Therapeutic Exercises: Progress Phase II exercises; add plyometrics and sport or work specific exercises; add running if patient wishes   · Modalities: Per therapist, including electrical stimulation, ultrasound, heat (before), ice (after)     Precautions: standard practice  Patient provided verbal consent to treatment plan and recommended interventions.    Access Code: [email or web address]  URL: [email or web address]  Date: 04/04/2024  Prepared by: Jose Manuel Navarro    Exercises  - Supine Calf Stretch with Strap  - 3 x daily - 7 x weekly - 1 sets - 10 reps - 10 hold  - Supine Quad Set  - 3 x daily - 7 x weekly - 3 sets - 10 reps  - Seated Heel Slide  - 3 x daily - 7 x weekly - 3 sets - 10 reps  - Supine Heel Slide with Strap  - 1 x daily - 7 x weekly - 3 sets - 10 reps  - Supine Gluteal Sets  - 1 x daily - 7 x weekly - 3 sets - 10 reps  - Calf raises - 3*10     Manuals 5/7/24 5/9/2024 5/17/24 5/20/24    visit 17 18 19 20                    Neuro Re-Ed                                Ther Ex        Nu step- st  RB 8', lvl 4 RB 8' LVL 4 Rec bike: 5 min lv 5 Assault bike 5'    Leg press 3*12,  "205# 1x12 205#  2x12 225# 1x12 205#  2x12 225#     Uni leg press 3*12, 115# 3x12 115# 115 lb 12 x 3     Uni deadlift 3*10, 25# KB 3x10 12kg KB B/L 12 KG 10 x2     Lateral step up 3*10, 6\" 3x10 9\" B/L  3*10, 6\" LLE    Lateral lunge with TRX  3x15 alternating sides Lateral lunge: 10 x 2  Reverse lunge: 10 x 2  Lateral lunge: 10 x 3  Reverse lunge: 10 x 3    Forward lunge onto bosu  3x15 alternating legs Forward lunge  3*10 BOSU    Monster walks  Resisted sidestepping    Sidestepping: green loop: 8 steps x 3 rounds  Monster walks: green loop 6-8 steps 3 rounds Sidestepping: green loop: 8 steps x 3 rounds  Monster walks: green loop 6-8 steps 3 rounds    Pod walking   6 pods; 4 cycles     3 way toe tap    2*10    Pt edu                                  "

## 2024-05-28 ENCOUNTER — OFFICE VISIT (OUTPATIENT)
Dept: PHYSICAL THERAPY | Facility: CLINIC | Age: 54
End: 2024-05-28
Payer: COMMERCIAL

## 2024-05-28 DIAGNOSIS — S83.412D SPRAIN OF MEDIAL COLLATERAL LIGAMENT OF LEFT KNEE, SUBSEQUENT ENCOUNTER: ICD-10-CM

## 2024-05-28 DIAGNOSIS — Z98.890 STATUS POST ARTHROSCOPIC PARTIAL MEDIAL MENISCECTOMY: Primary | ICD-10-CM

## 2024-05-28 DIAGNOSIS — M25.562 ACUTE PAIN OF LEFT KNEE: ICD-10-CM

## 2024-05-28 PROCEDURE — 97110 THERAPEUTIC EXERCISES: CPT | Performed by: PHYSICAL THERAPIST

## 2024-05-28 NOTE — PROGRESS NOTES
Daily Note     Today's date: 2024  Patient name: Leslee Stephenson  : 1970  MRN: [id number]  Referring provider: Elisabet Story PA-C  Dx:   Encounter Diagnosis     ICD-10-CM    1. Status post arthroscopic partial medial meniscectomy  Z98.890       2. Acute pain of left knee  M25.562       3. Sprain of medial collateral ligament of left knee, subsequent encounter  S83.938D                      Subjective: Patient reports soreness in medial knee and posterior medial region with standing for periods of time and with walking.     Objective: See treatment diary below    Assessment: Tolerated treatment well. Patient reported less posterior knee discomfort following popliteus targeted muscle mobilization. Discomfort reported when trying to reverse walk at cable column in squat position and exercise modified.    Plan: Continue per plan of care.      Insurance:  AMA/CMS Eval/ Re-eval POC expires FOTO Auth #/ Referral # Total    Start date  Expiration date Extension  Visit limitation?  PT only or  PT+OT? Co-Insurance   CMS 24 4.9.24  Auth approved    12 visits     CMS .24 530.24              AUTH #:  Date  2.21 2.29 3.7 3.11 3.14 3.19 3.21 4.4 4.9 4.11 4.15   Authed: Used 1 2 3 4 5 6 7 8 9 10 11 12    Remaining  11 10 9 8 7 6 5 4 3 2 1 0     AUTH #:  Date  4.26 4.30 5.2 5.7 5.9 24      Authed: Used 1 2 3 4 5 6 7 8 9       Remaining  11 10 9 8 7 6 5 4 3        Physical Therapy Protocol - Partial Meniscectomy     Wound Care: Keep the dressing clean and dry.  Light drainage may occur the first 2 days postop.   You may remove the dressings and get the incision wet in the shower 72 hours after surgery.  DO NOT remove steri-strips or sutures.  DO NOT immerse the incision under water.  Carefully pat the incision dry.  If there is wound drainage, re-apply a fresh dry gauze dressing.     PHASE I (Weeks 0 - 2):   · Goals: decrease edema, activate quadriceps   · Weightbearing: As  tolerated; use crutches as needed. Discontinue when gait normal   · Brace: none required   · Range of Motion: AAROM ? AROM as tolerated   · Therapeutic Exercises: Patellar mobs, quad/hamstring sets, heel slides, step-ups, straight-leg raises, stationary bike as tolerated; core exercises   · Modalities: Per therapist, including electrical stimulation, ultrasound, heat (before), ice (after)     Phase II (Weeks 2 - 4)   · Weightbearing: As tolerated   · Brace: None   · Range of Motion: Full   · Therapeutic Exercises: Progress Phase I exercises; lunges, wall-sits; add cycling and elliptical   · Modalities: Per therapist, including electrical stimulation, ultrasound, heat (before), ice (after)     Phase III (Weeks 4 - 6)   · Weightbearing: As tolerated   · Brace: None   · Range of Motion: Full   · Therapeutic Exercises: Progress Phase II exercises; add plyometrics and sport or work specific exercises; add running if patient wishes   · Modalities: Per therapist, including electrical stimulation, ultrasound, heat (before), ice (after)     Precautions: standard practice  Patient provided verbal consent to treatment plan and recommended interventions.    Access Code: [email or web address]  URL: [email or web address]  Date: 04/04/2024  Prepared by: Jose Manuel Navarro    Exercises  - Supine Calf Stretch with Strap  - 3 x daily - 7 x weekly - 1 sets - 10 reps - 10 hold  - Supine Quad Set  - 3 x daily - 7 x weekly - 3 sets - 10 reps  - Seated Heel Slide  - 3 x daily - 7 x weekly - 3 sets - 10 reps  - Supine Heel Slide with Strap  - 1 x daily - 7 x weekly - 3 sets - 10 reps  - Supine Gluteal Sets  - 1 x daily - 7 x weekly - 3 sets - 10 reps  - Calf raises - 3*10     Manuals 5/7/24 5/9/2024 5/17/24 5/23/24 5/28/24   visit 17 18 19 20 21   popliteus     FB MFR           Neuro Re-Ed                                Ther Ex        Nu step- st  RB 8', lvl 4 RB 8' LVL 4 Rec bike: 5 min lv 5 Assault bike 5' Assault bike 5'   Leg press 3*12,  "205# 1x12 205#  2x12 225# 1x12 205#  2x12 225#     Uni leg press 3*12, 115# 3x12 115# 115 lb 12 x 3  3*10, 125#   Uni deadlift 3*10, 25# KB 3x10 12kg KB B/L 12 KG 10 x2     Lateral step up 3*10, 6\" 3x10 9\" B/L  3*10, 6\" LLE 2*15, 6\" LLE   Lateral lunge with TRX  3x15 alternating sides Lateral lunge: 10 x 2  Reverse lunge: 10 x 2  Lateral lunge: 10 x 3  Reverse lunge: 10 x 3 Reverse lunge: 10 x 3   Forward lunge onto bosu  3x15 alternating legs Forward lunge  3*10 BOSU    Monster walks  Resisted sidestepping    Sidestepping: green loop: 8 steps x 3 rounds  Monster walks: green loop 6-8 steps 3 rounds Sidestepping: green loop: 8 steps x 3 rounds  Monster walks: green loop 6-8 steps 3 rounds Monster walks: green loop 6-8 steps 3 rounds   Rev. CC walk     37.5#, 1*10   3 way toe tap    2*10 2*10   Pt edu        Sled push     90# 5 laps 50'                     "

## 2024-05-30 ENCOUNTER — OFFICE VISIT (OUTPATIENT)
Dept: PHYSICAL THERAPY | Facility: CLINIC | Age: 54
End: 2024-05-30
Payer: COMMERCIAL

## 2024-05-30 DIAGNOSIS — Z98.890 STATUS POST ARTHROSCOPIC PARTIAL MEDIAL MENISCECTOMY: Primary | ICD-10-CM

## 2024-05-30 DIAGNOSIS — M25.562 ACUTE PAIN OF LEFT KNEE: ICD-10-CM

## 2024-05-30 DIAGNOSIS — S83.412D SPRAIN OF MEDIAL COLLATERAL LIGAMENT OF LEFT KNEE, SUBSEQUENT ENCOUNTER: ICD-10-CM

## 2024-05-30 PROCEDURE — 97140 MANUAL THERAPY 1/> REGIONS: CPT

## 2024-05-30 PROCEDURE — 97110 THERAPEUTIC EXERCISES: CPT

## 2024-05-30 PROCEDURE — 97112 NEUROMUSCULAR REEDUCATION: CPT

## 2024-05-30 NOTE — PROGRESS NOTES
Daily Note     Today's date: 2024  Patient name: Leslee Stephenson  : 1970  MRN: [id number]  Referring provider: Elisabet Story PA-C  Dx:   Encounter Diagnosis     ICD-10-CM    1. Status post arthroscopic partial medial meniscectomy  Z98.890       2. Acute pain of left knee  M25.562       3. Sprain of medial collateral ligament of left knee, subsequent encounter  S83.412D           Start Time: 1540  Stop Time: 1630  Total time in clinic (min): 50 minutes    Subjective: Some pain in left medial knee      Objective: See treatment diary below      Assessment: Tolerated treatment well. Patient would benefit from continued PT in order to build quads, hips, and glutes in order to support left knee.  Was able to perform all exercises with proper technique. No increase in pain during session. Continue to progress per primary PT.       Plan: Continue per plan of care.      Insurance:  AMA/CMS Eval/ Re-eval POC expires FOTO Auth #/ Referral # Total    Start date  Expiration date Extension  Visit limitation?  PT only or  PT+OT? Co-Insurance   CMS 24 4.  Auth approved    12 visits     CMS .24              AUTH #:  Date  2.21 2.29 3.7 3.11 3.14 3.19 3.21 4.4 4.9 4.11 4.15   Authed: Used 1 2 3 4 5 6 7 8 9 10 11 12    Remaining  11 10 9 8 7 6 5 4 3 2 1 0     AUTH #:  Date  4.26 4.30 5.2 5.7 5.9 24      Authed: Used 1 2 3 4 5 6 7 8 9       Remaining  11 10 9 8 7 6 5 4 3        Physical Therapy Protocol - Partial Meniscectomy     Wound Care: Keep the dressing clean and dry.  Light drainage may occur the first 2 days postop.   You may remove the dressings and get the incision wet in the shower 72 hours after surgery.  DO NOT remove steri-strips or sutures.  DO NOT immerse the incision under water.  Carefully pat the incision dry.  If there is wound drainage, re-apply a fresh dry gauze dressing.     PHASE I (Weeks 0 - 2):   · Goals: decrease edema, activate quadriceps   ·  Weightbearing: As tolerated; use crutches as needed. Discontinue when gait normal   · Brace: none required   · Range of Motion: AAROM ? AROM as tolerated   · Therapeutic Exercises: Patellar mobs, quad/hamstring sets, heel slides, step-ups, straight-leg raises, stationary bike as tolerated; core exercises   · Modalities: Per therapist, including electrical stimulation, ultrasound, heat (before), ice (after)     Phase II (Weeks 2 - 4)   · Weightbearing: As tolerated   · Brace: None   · Range of Motion: Full   · Therapeutic Exercises: Progress Phase I exercises; lunges, wall-sits; add cycling and elliptical   · Modalities: Per therapist, including electrical stimulation, ultrasound, heat (before), ice (after)     Phase III (Weeks 4 - 6)   · Weightbearing: As tolerated   · Brace: None   · Range of Motion: Full   · Therapeutic Exercises: Progress Phase II exercises; add plyometrics and sport or work specific exercises; add running if patient wishes   · Modalities: Per therapist, including electrical stimulation, ultrasound, heat (before), ice (after)     Precautions: standard practice  Patient provided verbal consent to treatment plan and recommended interventions.    Access Code: [email or web address]  URL: [email or web address]  Date: 04/04/2024  Prepared by: Jose Manuel Navarro    Exercises  - Supine Calf Stretch with Strap  - 3 x daily - 7 x weekly - 1 sets - 10 reps - 10 hold  - Supine Quad Set  - 3 x daily - 7 x weekly - 3 sets - 10 reps  - Seated Heel Slide  - 3 x daily - 7 x weekly - 3 sets - 10 reps  - Supine Heel Slide with Strap  - 1 x daily - 7 x weekly - 3 sets - 10 reps  - Supine Gluteal Sets  - 1 x daily - 7 x weekly - 3 sets - 10 reps  - Calf raises - 3*10     Manuals 5/7/24 5/9/2024 5/17/24 5/23/24 5/28/24 5/30/24   visit 17 18 19 20 21 22   popliteus     FB MFR          TPR left HS, gastroc complex, peronneal   Neuro Re-Ed                                 Slider with abd/ext / 45 deg 10x b/l   Ther Ex        "  Nu step- st  RB 8', lvl 4 RB 8' LVL 4 Rec bike: 5 min lv 5 Assault bike 5' Assault bike 5' Assault bike 5'   Leg press 3*12, 205# 1x12 205#  2x12 225# 1x12 205#  2x12 225#      Uni leg press 3*12, 115# 3x12 115# 115 lb 12 x 3  3*10, 125# 3*10 125# b/l   Uni deadlift 3*10, 25# KB 3x10 12kg KB B/L 12 KG 10 x2   Overturned BOSU squat   Lateral step up 3*10, 6\" 3x10 9\" B/L  3*10, 6\" LLE 2*15, 6\" LLE    Lateral lunge with TRX  3x15 alternating sides Lateral lunge: 10 x 2  Reverse lunge: 10 x 2  Lateral lunge: 10 x 3  Reverse lunge: 10 x 3 Reverse lunge: 10 x 3 RDL 2*15 9#   Forward lunge onto bosu  3x15 alternating legs Forward lunge  3*10 BOSU  Step down left 2*15 with focus on eccentric lowering 9# KB   Monster walks  Resisted sidestepping    Sidestepping: green loop: 8 steps x 3 rounds  Monster walks: green loop 6-8 steps 3 rounds Sidestepping: green loop: 8 steps x 3 rounds  Monster walks: green loop 6-8 steps 3 rounds Monster walks: green loop 6-8 steps 3 rounds Sidestepping: green loop: 8 steps x 3 rounds  Monster walks: green loop 6-8 steps 3 rounds   Rev. CC walk     37.5#, 1*10 TRX reverse lunge 2*10 b/l   3 way toe tap    2*10 2*10    Pt edu         Sled push     90# 5 laps 50'                        "

## 2024-06-03 ENCOUNTER — APPOINTMENT (OUTPATIENT)
Dept: PHYSICAL THERAPY | Facility: CLINIC | Age: 54
End: 2024-06-03
Payer: COMMERCIAL

## 2024-06-04 ENCOUNTER — EVALUATION (OUTPATIENT)
Dept: PHYSICAL THERAPY | Facility: CLINIC | Age: 54
End: 2024-06-04
Payer: COMMERCIAL

## 2024-06-04 DIAGNOSIS — Z98.890 STATUS POST ARTHROSCOPIC PARTIAL MEDIAL MENISCECTOMY: Primary | ICD-10-CM

## 2024-06-04 DIAGNOSIS — M25.562 ACUTE PAIN OF LEFT KNEE: ICD-10-CM

## 2024-06-04 DIAGNOSIS — S83.412D SPRAIN OF MEDIAL COLLATERAL LIGAMENT OF LEFT KNEE, SUBSEQUENT ENCOUNTER: ICD-10-CM

## 2024-06-04 PROCEDURE — 97110 THERAPEUTIC EXERCISES: CPT | Performed by: PHYSICAL THERAPIST

## 2024-06-04 PROCEDURE — 97112 NEUROMUSCULAR REEDUCATION: CPT | Performed by: PHYSICAL THERAPIST

## 2024-06-04 NOTE — PROGRESS NOTES
Daily Note/Re-evaluation    Today's date: 2024  Patient name: Leslee Stephenson  : 1970  MRN: [id number]  Referring provider: Elisabet Story PA-C  Dx:   Encounter Diagnosis     ICD-10-CM    1. Status post arthroscopic partial medial meniscectomy  Z98.890       2. Sprain of medial collateral ligament of left knee, subsequent encounter  S83.412D       3. Acute pain of left knee  M25.562           Start Time: 1520  Stop Time: 1615  Total time in clinic (min): 55 minutes    Subjective: Patient reports continued soreness in knee with ambulation without change reported in past few sessions with therapy. She reports that continues to be limited with walking for duration and has same knee pain as prior. Strength and Rom have returned and is not a limiting factor.    Pain  Current pain rating: 3  At best pain ratin  At worst pain ratin  Quality: soreness medial joint line      Short Term: met  Pt will report decreased levels of pain by at least 2 subjective ratings in 4 weeks  Pt will demonstrate improved ROM by at least 10 degrees in 4 weeks  Pt will demonstrate improved strength by 1/2 grade in 4 weeks.  Pt will be able to walk/stand without pain in 4 weeks.     Long Term: not met  Pt will be independent in their HEP in 8 weeks  Pt will be pain free with IADL's in 8 weeks.  Pt will be able to walk>45 minutes in 8 weeks   Pt will return to exercising for health and work in 8 weeks without limitations.     Objective: See treatment diary below     GAIT: normal gait pattern  Squat assess: normal squat depth  SLS: RLE: > 10 sec. LLE: > 10 sec.               MMT     Hip         R          L   Flex. 5 5   Extn. 5 4+                  MMT          AROM          PROM     Knee      R          L         R          L           R         L   Flex. 5 5 125 WNL      Extn. 5 5 0 WNL                                       MMT     Ankle         R          L   PF 5 5   DF. 5 5   EHL 5 5          Objective: See treatment diary  below    Assessment: Tolerated treatment well. Patient reported occasional knee discomfort with lateral based movements. Patient has reach a plateau with therapy. 5/5 MMT with strength noted and patient to return to physician to determine if other intervention is warranted.     Plan: 1 visit to allow for treatment rendered today.      Insurance:  AMA/CMS Eval/ Re-eval POC expires FOTO Auth #/ Referral # Total    Start date  Expiration date Extension  Visit limitation?  PT only or  PT+OT? Co-Insurance   CMS 2.13.24 4.9.24  Auth approved  2/14 5/14  12 visits     CMS 4.4.24 5.30.24            CMS 6.4.24 6.4.24              AUTH #:  Date 2.13 2.21 2.29 3.7 3.11 3.14 3.19 3.21 4.4 4.9 4.11 4.15   Authed: Used 1 2 3 4 5 6 7 8 9 10 11 12    Remaining  11 10 9 8 7 6 5 4 3 2 1 0     AUTH #:  Date 4.24 4.26 4.30 5.2 5.7 5.9 5/17/24 5/23 5/28 5.30 6.4    Authed: Used 1 2 3 4 5 6 7 8 9 10 11     Remaining  11 10 9 8 7 6 5 4 3 2 1      Physical Therapy Protocol - Partial Meniscectomy     Phase III (Weeks 4 - 6)   · Weightbearing: As tolerated   · Brace: None   · Range of Motion: Full   · Therapeutic Exercises: Progress Phase II exercises; add plyometrics and sport or work specific exercises; add running if patient wishes   · Modalities: Per therapist, including electrical stimulation, ultrasound, heat (before), ice (after)     Precautions: standard practice  Patient provided verbal consent to treatment plan and recommended interventions.    Access Code: [email or web address]  URL: [email or web address]  Date: 04/04/2024  Prepared by: Jose Manuel Navarro    Exercises  - Supine Calf Stretch with Strap  - 3 x daily - 7 x weekly - 1 sets - 10 reps - 10 hold  - Supine Quad Set  - 3 x daily - 7 x weekly - 3 sets - 10 reps  - Seated Heel Slide  - 3 x daily - 7 x weekly - 3 sets - 10 reps  - Supine Heel Slide with Strap  - 1 x daily - 7 x weekly - 3 sets - 10 reps  - Supine Gluteal Sets  - 1 x daily - 7 x weekly - 3 sets - 10 reps  - Calf  "raises - 3*10     Manuals 5/17/24 5/23/24 5/28/24 5/30/24 6/4/24   visit 19 20 21 22 23   MFR   FB popliteus         TPR left HS, gastroc complex, peroneal    Neuro Re-Ed            Slider with abd/ext / 45 deg 10x b/l Slider with abd/ext 3-10 ea.   Ther Ex        Nu step- st  Rec bike: 5 min lv 5 Assault bike 5' Assault bike 5' Assault bike 5' Assault bike 5'   Leg press 1x12 205#  2x12 225#       Uni leg press 115 lb 12 x 3  3*10, 125# 3*10 125# b/l 3*10 125# ea.   Overturned BOSU squat 12 KG 10 x2   3*10 3*10   Lateral step up  3*10, 6\" LLE 2*15, 6\" LLE     Lateral lunge with TRX Lateral lunge: 10 x 2  Reverse lunge: 10 x 2  Lateral lunge: 10 x 3  Reverse lunge: 10 x 3 Reverse lunge: 10 x 3 RDL 2*15 9#    Forward lunge onto bosu Forward lunge  3*10 BOSU  Step down left 2*15 with focus on eccentric lowering 9# KB    Monster walks  Resisted sidestepping  Sidestepping: green loop: 8 steps x 3 rounds  Monster walks: green loop 6-8 steps 3 rounds Sidestepping: green loop: 8 steps x 3 rounds  Monster walks: green loop 6-8 steps 3 rounds Monster walks: green loop 6-8 steps 3 rounds Sidestepping: green loop: 8 steps x 3 rounds  Monster walks: green loop 6-8 steps 3 rounds Sidestepping: green loop: 8 steps x 3 rounds  Monster walks: green loop 6-8 steps 3 rounds   TRX reverse lunge   37.5#, 1*10 TRX reverse lunge 2*10 b/l TRX reverse lunge 2*10 b/l   3 way toe tap  2*10 2*10     Sled push   90# 5 laps 50'                         "

## 2024-06-06 ENCOUNTER — APPOINTMENT (OUTPATIENT)
Dept: PHYSICAL THERAPY | Facility: CLINIC | Age: 54
End: 2024-06-06
Payer: COMMERCIAL

## 2024-06-10 ENCOUNTER — APPOINTMENT (OUTPATIENT)
Dept: PHYSICAL THERAPY | Facility: CLINIC | Age: 54
End: 2024-06-10
Payer: COMMERCIAL

## 2024-06-13 ENCOUNTER — APPOINTMENT (OUTPATIENT)
Dept: PHYSICAL THERAPY | Facility: CLINIC | Age: 54
End: 2024-06-13
Payer: COMMERCIAL

## 2024-06-17 ENCOUNTER — APPOINTMENT (OUTPATIENT)
Dept: PHYSICAL THERAPY | Facility: CLINIC | Age: 54
End: 2024-06-17
Payer: COMMERCIAL

## 2024-06-24 ENCOUNTER — TELEPHONE (OUTPATIENT)
Dept: OBGYN CLINIC | Facility: CLINIC | Age: 54
End: 2024-06-24

## 2024-06-24 ENCOUNTER — OFFICE VISIT (OUTPATIENT)
Dept: OBGYN CLINIC | Facility: CLINIC | Age: 54
End: 2024-06-24

## 2024-06-24 VITALS
DIASTOLIC BLOOD PRESSURE: 80 MMHG | SYSTOLIC BLOOD PRESSURE: 125 MMHG | HEART RATE: 61 BPM | HEIGHT: 67 IN | BODY MASS INDEX: 29.82 KG/M2 | WEIGHT: 190 LBS

## 2024-06-24 DIAGNOSIS — Z98.890 S/P ARTHROSCOPIC PARTIAL MEDIAL MENISCECTOMY OF LEFT KNEE: Primary | ICD-10-CM

## 2024-06-24 DIAGNOSIS — Z87.828 S/P ARTHROSCOPIC PARTIAL MEDIAL MENISCECTOMY OF LEFT KNEE: Primary | ICD-10-CM

## 2024-06-24 PROCEDURE — 99024 POSTOP FOLLOW-UP VISIT: CPT | Performed by: ORTHOPAEDIC SURGERY

## 2024-06-24 NOTE — PROGRESS NOTES
SUBJECTIVE:  Patient is a 54 y.o. year old female who presents for follow up now 11.5 weeks s/p Knee Arthroscopic partial medial meniscectomy - Left performed on  4/3/2024.  Today patient has 4/10 pain. She has stopped attending physical therapy, as she feels she was no longer making progress. She reports experiencing soreness with walking. Most of her pain is located medially and posteriorly. She is going to [facility] in September and is interested in receiving a corticosteroid injection.      VITALS:  Vitals:    06/24/24 0802   BP: 125/80   Pulse: 61       PHYSICAL EXAMINATION:  General: well developed and well nourished, alert, oriented times 3, and appears comfortable  Psychiatric: Normal    MUSCULOSKELETAL EXAMINATION:    Left Lower Extremity   Incision: Clean, dry, and intact; No signs of infection  No effusion  Palpation: medial joint line  Range of Motion: 0° to 125°  Strength: 5/5 quad strength  +EHL/FHL/TA/GS  SILT throughout  Palpable DP pulse       PLAN:    Leslee is a pleasant 54 y.o. female who presents for follow-up evaluation 11.5 weeks status post Knee Arthroscopic partial medial meniscectomy - Left on 4/3/2024. She is still experiencing soreness along the medial joint line. We discussed options for injections.  For now she opted to hold off but consider 1 closer to the time of her trip to [facility] in September.  We discussed a medial  brace for the left knee today to wear when she is active. An order was placed for the brace today. She will follow-up in 8 weeks for re-evaluation.  Will continue to discuss injections both corticosteroid versus NSAID injection at that time.    Scribe Attestation      I,:  Cecilia Brandon am acting as a scribe while in the presence of the attending physician.:       I,:  Karl Regalado MD personally performed the services described in this documentation    as scribed in my presence.:

## 2024-07-02 ENCOUNTER — HOSPITAL ENCOUNTER (OUTPATIENT)
Dept: RADIOLOGY | Facility: HOSPITAL | Age: 54
Discharge: HOME | End: 2024-07-02
Attending: STUDENT IN AN ORGANIZED HEALTH CARE EDUCATION/TRAINING PROGRAM
Payer: COMMERCIAL

## 2024-07-02 VITALS — WEIGHT: 190 LBS | BODY MASS INDEX: 29.82 KG/M2 | HEIGHT: 67 IN

## 2024-07-02 DIAGNOSIS — Z12.31 ENCOUNTER FOR SCREENING MAMMOGRAM FOR BREAST CANCER: ICD-10-CM

## 2024-07-02 DIAGNOSIS — Z80.3 FAMILY HISTORY OF BREAST CANCER: ICD-10-CM

## 2024-07-02 PROCEDURE — 77063 BREAST TOMOSYNTHESIS BI: CPT

## 2024-07-02 PROCEDURE — 77067 SCR MAMMO BI INCL CAD: CPT

## 2024-07-18 ENCOUNTER — OFFICE VISIT (OUTPATIENT)
Dept: OBGYN CLINIC | Facility: CLINIC | Age: 54
End: 2024-07-18
Payer: COMMERCIAL

## 2024-07-18 VITALS
DIASTOLIC BLOOD PRESSURE: 80 MMHG | HEIGHT: 67 IN | BODY MASS INDEX: 30.61 KG/M2 | SYSTOLIC BLOOD PRESSURE: 110 MMHG | WEIGHT: 195 LBS

## 2024-07-18 DIAGNOSIS — N95.1 VASOMOTOR SYMPTOMS DUE TO MENOPAUSE: Primary | ICD-10-CM

## 2024-07-18 DIAGNOSIS — Z79.890 HORMONE REPLACEMENT THERAPY (HRT): ICD-10-CM

## 2024-07-18 DIAGNOSIS — N95.1 SYMPTOMATIC MENOPAUSAL OR FEMALE CLIMACTERIC STATES: ICD-10-CM

## 2024-07-18 PROCEDURE — 99214 OFFICE O/P EST MOD 30 MIN: CPT | Performed by: STUDENT IN AN ORGANIZED HEALTH CARE EDUCATION/TRAINING PROGRAM

## 2024-07-18 RX ORDER — ESTRADIOL 0.5 MG/1
0.5 TABLET ORAL EVERY OTHER DAY
Qty: 30 TABLET | Refills: 3 | Status: SHIPPED | OUTPATIENT
Start: 2024-07-18 | End: 2024-07-18

## 2024-07-18 RX ORDER — ESTRADIOL 0.5 MG/1
0.5 TABLET ORAL DAILY
Qty: 30 TABLET | Refills: 3 | Status: SHIPPED | OUTPATIENT
Start: 2024-07-18

## 2024-07-18 NOTE — PROGRESS NOTES
Ambulatory Visit  Name: Leslee Stephenson      : 1970      MRN: [id number]  Encounter Provider: Karol Anand MD  Encounter Date: 2024   Encounter department: [facility] [place]    Assessment & Plan   1. Vasomotor symptoms due to menopause  -     estradiol (Estrace) 0.5 MG tablet; Take 1 tablet (0.5 mg total) by mouth daily  2. Hormone replacement therapy (HRT)  -     estradiol (Estrace) 0.5 MG tablet; Take 1 tablet (0.5 mg total) by mouth daily  3. BMI 30.0-30.9,adult  -     Ambulatory Referral to Weight Management; Future  4. Symptomatic menopausal or female climacteric states    Doing well on low dose HRT (estrace 0.5mg daily or every other day), feels symptoms well-controlled  CVD risk score 1.6% with normal blood pressure  Reviewed generally HRT recommended at lowest dose for shortest course possible given risk of  CVD (HTN, DVT, stroke) increased with time, however, given no contraindications, reasonable to continue for now if she prefers  Reviewed consideration of non-hormonal methods like gabapentin, Paxil, Veozah, would like to continue HRT for now  Would like to lose weight, discussed lifestyle interventions, referral to weight management for discussion--interested in medication management  RTO for annual     History of Present Illness     Leslee Stephenson is a 54 y.o. female who presents for follow-up on HRT    Had been off of Hrt for 4-6 months  Felt like hot flashes were frequent and disrupted her sleep, wanted to return  Restarted in January  Has been taking estrace 0.5mg daily or every other day and feels better  On the days she doesn't take, feels some symptoms at night like warmer when sleeping, but not as intense  Hasn't noted change in vaginal dryness, tried vaginal estrogen in past but didn't like    Had been checking blood pressure, but normal so stopped    Exercises 4x/week: lifts, bikes, walks dog, about 60-90 minutes  Well-balanced  "diet  About 4 portions of fruits/vegetables/day  Minimal alcohol  1-2x/week has dessert    Review of Systems --per HPI    Objective     /80 (BP Location: Left arm, Patient Position: Sitting)   Ht 5' 7\" (1.702 m)   Wt 88.5 kg (195 lb)   BMI 30.54 kg/m²     Physical Exam  Vitals and nursing note reviewed.   Constitutional:       General: She is not in acute distress.     Appearance: She is well-developed.   HENT:      Head: Normocephalic and atraumatic.   Eyes:      Conjunctiva/sclera: Conjunctivae normal.   Pulmonary:      Effort: Pulmonary effort is normal.   Neurological:      Mental Status: She is alert.   Psychiatric:         Mood and Affect: Mood normal.       Administrative Statements           "

## 2024-08-13 DIAGNOSIS — Z79.890 HORMONE REPLACEMENT THERAPY (HRT): ICD-10-CM

## 2024-08-13 DIAGNOSIS — N95.1 VASOMOTOR SYMPTOMS DUE TO MENOPAUSE: ICD-10-CM

## 2024-08-13 RX ORDER — ESTRADIOL 0.5 MG/1
0.5 TABLET ORAL DAILY
Qty: 90 TABLET | Refills: 1 | Status: SHIPPED | OUTPATIENT
Start: 2024-08-13

## 2024-08-19 ENCOUNTER — OFFICE VISIT (OUTPATIENT)
Dept: OBGYN CLINIC | Facility: CLINIC | Age: 54
End: 2024-08-19
Payer: COMMERCIAL

## 2024-08-19 VITALS
BODY MASS INDEX: 30.61 KG/M2 | HEIGHT: 67 IN | SYSTOLIC BLOOD PRESSURE: 139 MMHG | WEIGHT: 195 LBS | HEART RATE: 60 BPM | DIASTOLIC BLOOD PRESSURE: 91 MMHG

## 2024-08-19 DIAGNOSIS — M25.562 ACUTE PAIN OF LEFT KNEE: ICD-10-CM

## 2024-08-19 DIAGNOSIS — Z87.828 S/P ARTHROSCOPIC PARTIAL MEDIAL MENISCECTOMY OF LEFT KNEE: Primary | ICD-10-CM

## 2024-08-19 DIAGNOSIS — Z98.890 S/P ARTHROSCOPIC PARTIAL MEDIAL MENISCECTOMY OF LEFT KNEE: Primary | ICD-10-CM

## 2024-08-19 PROCEDURE — 99213 OFFICE O/P EST LOW 20 MIN: CPT | Performed by: ORTHOPAEDIC SURGERY

## 2024-08-19 PROCEDURE — 20610 DRAIN/INJ JOINT/BURSA W/O US: CPT | Performed by: ORTHOPAEDIC SURGERY

## 2024-08-19 RX ADMIN — TRIAMCINOLONE ACETONIDE 40 MG: 40 INJECTION, SUSPENSION INTRA-ARTICULAR; INTRAMUSCULAR at 16:00

## 2024-08-19 NOTE — PROGRESS NOTES
"SUBJECTIVE:  Patient is a 54 y.o. year old female who presents for follow up now 4.5 months s/p  Knee Arthroscopic partial medial meniscectomy - Left performed on  4/3/2024.  Today patient has medial-sided left knee pain. She has resumed weight lifting and exercising on her own. She incorporates some physical therapy exercises, as well. She has a vacation to [facility] coming up and would like a corticosteroid injection today. She has been taking Motrin and using Voltaren gel occasionally for left knee pain.       VITALS:  Vitals:    08/19/24 1553   BP: 139/91   Pulse: 60       PHYSICAL EXAMINATION:  General: well developed and well nourished, alert, oriented times 3, and appears comfortable  Psychiatric: Normal    MUSCULOSKELETAL EXAMINATION:    Left Lower Extremity   Incision: Clean, dry, and intact  Range of Motion: 0 to 120°  Strength: 5/5 quad strength  +EHL/FHL/TA/GS  SILT throughout  Palpable DP pulse       PLAN:    Leslee is a pleasant 54 y.o. female who presents 4.5 months status post Knee Arthroscopic partial medial meniscectomy - Left on 4/3/2024. I believe her medial-sided left knee pain is consistent with her underlying osteoarthritis of the knee. I offered her a corticosteroid injection of the left knee today as she will be very active on her upcoming vacation to [facility]. She was amenable to this and tolerated the procedure well. Injection aftercare instructions were provided to her. She will follow-up as needed in regards to the left knee.    Large joint arthrocentesis: L knee  Universal Protocol:  Consent: Verbal consent obtained.  Risks and benefits: risks, benefits and alternatives were discussed  Consent given by: patient  Time out: Immediately prior to procedure a \"time out\" was called to verify the correct patient, procedure, equipment, support staff and site/side marked as required.  Patient understanding: patient states understanding of the procedure being performed  Site marked: the " operative site was marked  Patient identity confirmed: verbally with patient  Supporting Documentation  Indications: pain   Procedure Details  Location: knee - L knee  Preparation: Patient was prepped and draped in the usual sterile fashion  Needle size: 22 G  Approach: anterolateral  Medications administered: 40 mg triamcinolone acetonide 40 mg/mL (4 mL ropivicaine 0.2 % injection)    Patient tolerance: patient tolerated the procedure well with no immediate complications  Dressing:  Sterile dressing applied           Scribe Attestation      I,:  Cecilia Brandon am acting as a scribe while in the presence of the attending physician.:       I,:  Karl Regalado MD personally performed the services described in this documentation    as scribed in my presence.:

## 2024-08-20 RX ORDER — TRIAMCINOLONE ACETONIDE 40 MG/ML
40 INJECTION, SUSPENSION INTRA-ARTICULAR; INTRAMUSCULAR
Status: COMPLETED | OUTPATIENT
Start: 2024-08-19 | End: 2024-08-19

## 2024-08-29 ENCOUNTER — OFFICE VISIT (OUTPATIENT)
Dept: OBGYN CLINIC | Facility: CLINIC | Age: 54
End: 2024-08-29
Payer: COMMERCIAL

## 2024-08-29 VITALS
BODY MASS INDEX: 30.61 KG/M2 | WEIGHT: 195 LBS | HEIGHT: 67 IN | HEART RATE: 69 BPM | DIASTOLIC BLOOD PRESSURE: 83 MMHG | SYSTOLIC BLOOD PRESSURE: 135 MMHG

## 2024-08-29 DIAGNOSIS — M17.11 PRIMARY OSTEOARTHRITIS OF RIGHT KNEE: Primary | ICD-10-CM

## 2024-08-29 PROCEDURE — 99213 OFFICE O/P EST LOW 20 MIN: CPT | Performed by: ORTHOPAEDIC SURGERY

## 2024-08-29 PROCEDURE — 20610 DRAIN/INJ JOINT/BURSA W/O US: CPT | Performed by: ORTHOPAEDIC SURGERY

## 2024-08-29 RX ADMIN — TRIAMCINOLONE ACETONIDE 40 MG: 40 INJECTION, SUSPENSION INTRA-ARTICULAR; INTRAMUSCULAR at 14:30

## 2024-09-03 RX ORDER — TRIAMCINOLONE ACETONIDE 40 MG/ML
40 INJECTION, SUSPENSION INTRA-ARTICULAR; INTRAMUSCULAR
Status: COMPLETED | OUTPATIENT
Start: 2024-08-29 | End: 2024-08-29

## 2024-09-03 NOTE — PROGRESS NOTES
"SUBJECTIVE:  Patient is a 54 y.o. year old female who presents for follow up now 5 months s/p  Knee Arthroscopic partial medial meniscectomy - Left performed on  4/3/2024.  Left knee has been doing well since she received injection at last visit.  Today she is presenting with pain in the contralateral right knee.  She has previously been treated for early osteoarthritis in this knee.  Is not having any significant locking or swelling.  She is having achy pain that is worse after prolonged sitting and loosens up with motion.  Is all localized to the medial aspect of the joint.  She is have a trip coming up soon and she isinterested in an injection in this knee today.      VITALS:  Vitals:    08/29/24 1421   BP: 135/83   Pulse: 69       PHYSICAL EXAMINATION:  General: well developed and well nourished, alert, oriented times 3, and appears comfortable  Psychiatric: Normal    MUSCULOSKELETAL EXAMINATION:  Right knee with no significant effusion or wounds  Range of motion from 0-1 30  Stable to varus, valgus, Lachman, posterior drawer  Medial joint line tenderness  Neuro vas intact distally          PLAN:    Leslee is a pleasant 54 y.o. female who presents 5+ months status post Knee Arthroscopic partial medial meniscectomy - Left on 4/3/2024.  She now is having right knee pain is consistent with osteoarthritis.  X-rays show minimal joint space narrowing.  Her description of pain is consistent with arthritis that is stiff after prolonged sitting and loosens up.  He will become more painful after prolonged activity as well.  She has a trip coming up and she is requesting an injection today which I did perform.  This was tolerated well.  She will follow-up as needed in the future.    Large joint arthrocentesis: R knee  Universal Protocol:  Consent: Verbal consent obtained.  Risks and benefits: risks, benefits and alternatives were discussed  Consent given by: patient  Time out: Immediately prior to procedure a \"time out\" was " called to verify the correct patient, procedure, equipment, support staff and site/side marked as required.  Patient understanding: patient states understanding of the procedure being performed  Site marked: the operative site was marked  Patient identity confirmed: verbally with patient  Supporting Documentation  Indications: pain   Procedure Details  Location: knee - R knee  Preparation: Patient was prepped and draped in the usual sterile fashion  Needle size: 22 G  Approach: anterolateral  Medications administered: 40 mg triamcinolone acetonide 40 mg/mL (4 mL ropivicaine 0.2 % injection)    Patient tolerance: patient tolerated the procedure well with no immediate complications  Dressing:  Sterile dressing applied         Scribe Attestation      I,:   am acting as a scribe while in the presence of the attending physician.:       I,:   personally performed the services described in this documentation    as scribed in my presence.:

## 2024-10-27 DIAGNOSIS — K21.9 GERD (GASTROESOPHAGEAL REFLUX DISEASE): ICD-10-CM

## 2024-10-28 RX ORDER — PANTOPRAZOLE SODIUM 40 MG/1
40 TABLET, DELAYED RELEASE ORAL DAILY
Qty: 90 TABLET | Refills: 1 | Status: SHIPPED | OUTPATIENT
Start: 2024-10-28

## 2024-10-30 ENCOUNTER — ANNUAL EXAM (OUTPATIENT)
Dept: OBGYN CLINIC | Facility: CLINIC | Age: 54
End: 2024-10-30
Payer: COMMERCIAL

## 2024-10-30 VITALS
SYSTOLIC BLOOD PRESSURE: 122 MMHG | BODY MASS INDEX: 31.55 KG/M2 | DIASTOLIC BLOOD PRESSURE: 90 MMHG | WEIGHT: 201 LBS | HEIGHT: 67 IN

## 2024-10-30 DIAGNOSIS — N95.1 MENOPAUSAL VASOMOTOR SYNDROME: ICD-10-CM

## 2024-10-30 DIAGNOSIS — E78.5 DYSLIPIDEMIA: ICD-10-CM

## 2024-10-30 DIAGNOSIS — Z01.419 WOMEN'S ANNUAL ROUTINE GYNECOLOGICAL EXAMINATION: Primary | ICD-10-CM

## 2024-10-30 PROCEDURE — S0612 ANNUAL GYNECOLOGICAL EXAMINA: HCPCS | Performed by: STUDENT IN AN ORGANIZED HEALTH CARE EDUCATION/TRAINING PROGRAM

## 2024-10-30 RX ORDER — PAROXETINE 10 MG/1
10 TABLET, FILM COATED ORAL DAILY
Qty: 30 TABLET | Refills: 1 | Status: SHIPPED | OUTPATIENT
Start: 2024-10-30
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
## 2024-11-02 ENCOUNTER — APPOINTMENT (OUTPATIENT)
Dept: LAB | Facility: HOSPITAL | Age: 54
End: 2024-11-02
Attending: STUDENT IN AN ORGANIZED HEALTH CARE EDUCATION/TRAINING PROGRAM
Payer: COMMERCIAL

## 2024-11-02 DIAGNOSIS — Z01.419 WOMEN'S ANNUAL ROUTINE GYNECOLOGICAL EXAMINATION: ICD-10-CM

## 2024-11-02 DIAGNOSIS — E78.5 DYSLIPIDEMIA: ICD-10-CM

## 2024-11-02 LAB
ALBUMIN SERPL BCG-MCNC: 4.2 G/DL (ref 3.5–5)
ALP SERPL-CCNC: 61 U/L (ref 34–104)
ALT SERPL W P-5'-P-CCNC: 19 U/L (ref 7–52)
ANION GAP SERPL CALCULATED.3IONS-SCNC: 4 MMOL/L (ref 4–13)
AST SERPL W P-5'-P-CCNC: 15 U/L (ref 13–39)
BILIRUB SERPL-MCNC: 0.46 MG/DL (ref 0.2–1)
BUN SERPL-MCNC: 25 MG/DL (ref 5–25)
CALCIUM SERPL-MCNC: 9.7 MG/DL (ref 8.4–10.2)
CHLORIDE SERPL-SCNC: 107 MMOL/L (ref 96–108)
CHOLEST SERPL-MCNC: 199 MG/DL
CO2 SERPL-SCNC: 30 MMOL/L (ref 21–32)
CREAT SERPL-MCNC: 0.72 MG/DL (ref 0.6–1.3)
GFR SERPL CREATININE-BSD FRML MDRD: 95 ML/MIN/1.73SQ M
GLUCOSE P FAST SERPL-MCNC: 93 MG/DL (ref 65–99)
HDLC SERPL-MCNC: 63 MG/DL
LDLC SERPL CALC-MCNC: 111 MG/DL (ref 0–100)
NONHDLC SERPL-MCNC: 136 MG/DL
POTASSIUM SERPL-SCNC: 4.5 MMOL/L (ref 3.5–5.3)
PROT SERPL-MCNC: 7.1 G/DL (ref 6.4–8.4)
SODIUM SERPL-SCNC: 141 MMOL/L (ref 135–147)
TRIGL SERPL-MCNC: 127 MG/DL
TSH SERPL DL<=0.05 MIU/L-ACNC: 1.72 UIU/ML (ref 0.45–4.5)

## 2024-11-02 PROCEDURE — 80061 LIPID PANEL: CPT

## 2024-11-02 PROCEDURE — 80053 COMPREHEN METABOLIC PANEL: CPT

## 2024-11-02 PROCEDURE — 84443 ASSAY THYROID STIM HORMONE: CPT

## 2024-11-02 PROCEDURE — 36415 COLL VENOUS BLD VENIPUNCTURE: CPT

## 2024-11-11 ENCOUNTER — OFFICE VISIT (OUTPATIENT)
Dept: OBGYN CLINIC | Facility: CLINIC | Age: 54
End: 2024-11-11
Payer: COMMERCIAL

## 2024-11-11 ENCOUNTER — CLINICAL SUPPORT (OUTPATIENT)
Dept: BARIATRICS | Facility: CLINIC | Age: 54
End: 2024-11-11

## 2024-11-11 VITALS — WEIGHT: 198.6 LBS | BODY MASS INDEX: 31.92 KG/M2 | HEIGHT: 66 IN

## 2024-11-11 VITALS
DIASTOLIC BLOOD PRESSURE: 82 MMHG | HEIGHT: 67 IN | SYSTOLIC BLOOD PRESSURE: 119 MMHG | HEART RATE: 65 BPM | WEIGHT: 200 LBS | BODY MASS INDEX: 31.39 KG/M2

## 2024-11-11 DIAGNOSIS — R63.5 ABNORMAL WEIGHT GAIN: Primary | ICD-10-CM

## 2024-11-11 DIAGNOSIS — M23.91 INTERNAL DERANGEMENT OF RIGHT KNEE: Primary | ICD-10-CM

## 2024-11-11 PROCEDURE — RECHECK

## 2024-11-11 PROCEDURE — WMDI30

## 2024-11-11 PROCEDURE — 99214 OFFICE O/P EST MOD 30 MIN: CPT | Performed by: ORTHOPAEDIC SURGERY

## 2024-11-11 NOTE — PROGRESS NOTES
"Weight Management Medical Nutrition Assessment  Pt is here today for menu planning. Current weight 198.6#.  She reports that her most average weight is 160-170#, but has struggled since she had her kids over 20 years ago. Reviewed diet recall.  Noted she wakes up early but does not eat until 9am, has protein at her lunch and dinner and will grab a snack before her workout. She is currently weight lifting but is struggling with cardio due to knee pain.  Did some menu planning suggesting a protein shake earlier in the morning to get the day started and help better meet protein needs and continue to have something at 9am that is about 200 cals.  Suggested pt log her food to get a better understanding of the number of calories she is consuming since 1200 cals can add up quickly.  Pt will f/u in one month.     Patient seen by Medical Provider in past 6 months:  no  Requested to schedule appointment with Medical Provider: yes    Anthropometric Measurements  Current Weight (#): 198.6#  TBW % Change from start weight: --%  IBW: 130# (66\")  Goal Weight (#): 160-170#    Weight Loss History  Previous weight loss attempts: Commercial Programs (Weight Watchers, CRV, etc.)  Exercise  Self Created Diets (Portion Control, Healthy Food Choices, etc.)    Food and Nutrition Related History  Wake up: wakes 5-5:30   Bed Time: 10pm  Sleep quality: poor-possibly due to hot flashes and the knee pain that is waking.     Dietary Recall  Coffee with an oatmilk creamer  Breakfast: 9am-used to like eggs, but has been bother her stomach OR english muffin OR yogurt + fruit     Snack: -  Lunch: 12pm-Salad w/homemade vinaigrette or balsalmic with chicken tenders + tortilla strips at times  Snack: apple   Snacks:  granola bar/energy bar  Weight lifts + walks the dog  Dinner: 6:15-7:30pm:  chicken many different ways or steak/rushing broil +broccoli/asparagus/[place] sprouts + baked potato OR pasta   Snack: occ ice cream at night or ice " pop    Beverages: water, coffee, hot tea  Volume of beverage intake: 30oz water per day, 12oz coffee + 12oz hot tea    Weekends: busier, maybe burger for lunch   Cravings: not usually   Trouble area of day: not so much    Frequency of Eating out: once every two weeks  Food restrictions: maybe eggs  Lives with   Cooking: pt  Food Shopping: pt    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)    Physical Activity  Activity: lifting 4 days per week--upper body right now because R knee has been bother her (3 years)   Frequency: 4 days per week  Physical limitations/barriers to exercise: cardio is limited due to R knee pain going for MRI    Estimated Needs  [employer] Needs (needs at 198.6#)  BMR: 1518 kcal  Maintenance calories (sedentary): 1821 kcal  1-2#/week loss (sedentary): 821-1321 kcal  1-2#/week loss (light activity): 4982-5367 kcal    Protein: 71-88 grams (1.2-1.5g/kg IBW)  Fluid: 2068ml (35mL/kg IBW)    Nutrition Diagnosis  Yes;    Overweight/obesity  related to Excess energy intake as evidenced by  BMI more than normative standard for age and sex (obesity-grade I 30-34.9)       Nutrition Intervention    Nutrition Prescription  Calories: 1200 kcal  Protein: 75-85 g  Fluid: 2000 ml    Meal Plan (Franko/Pro)  Breakfast: 6am-150 franko protein shake   Snack:  200/20   Lunch: 300cals/21  Snack: 150  Dinner: 400cals/21g  Snack: --    Nutrition Education  Calorie controlled menu  Lean protein food choices  Healthy snack options  Food journaling tips    Nutrition Counseling  Strategies: meal planning, portion sizes, healthy snack choices, hydration, fiber intake, protein intake, exercise, food logging    Monitoring and Evaluation:    Evaluation criteria  Energy Intake  Meet protein needs  Maintain adequate hydration  Monitor weekly weight  Meal planning/preparation  Food journal   Decreased portions at mealtimes and snacks  Physical activity     Barriers to change:none  Readiness to  change: Preparation:  (Getting ready to change)   Comprehension: good  Expected Compliance: good
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
## 2024-11-15 ENCOUNTER — TELEPHONE (OUTPATIENT)
Dept: BARIATRICS | Facility: CLINIC | Age: 54
End: 2024-11-15

## 2024-11-15 NOTE — TELEPHONE ENCOUNTER
Previsit call attempted. Pt not available to complete questionnaire at this time. Requested to call back at another time.

## 2024-11-19 ENCOUNTER — OFFICE VISIT (OUTPATIENT)
Age: 54
End: 2024-11-19
Payer: COMMERCIAL

## 2024-11-19 VITALS
WEIGHT: 199.8 LBS | HEART RATE: 68 BPM | BODY MASS INDEX: 32.11 KG/M2 | SYSTOLIC BLOOD PRESSURE: 120 MMHG | HEIGHT: 66 IN | TEMPERATURE: 97.5 F | DIASTOLIC BLOOD PRESSURE: 72 MMHG | RESPIRATION RATE: 16 BRPM

## 2024-11-19 DIAGNOSIS — E66.811 OBESITY, CLASS I, BMI 30-34.9: Primary | ICD-10-CM

## 2024-11-19 DIAGNOSIS — R73.01 IMPAIRED FASTING GLUCOSE: ICD-10-CM

## 2024-11-19 DIAGNOSIS — E78.5 HYPERLIPIDEMIA: ICD-10-CM

## 2024-11-19 PROCEDURE — 99244 OFF/OP CNSLTJ NEW/EST MOD 40: CPT | Performed by: PHYSICIAN ASSISTANT
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
## 2024-11-19 NOTE — PATIENT INSTRUCTIONS
Visit [email or web address] for further information/injection instructions. Please eat small frequent meals to help reduce nausea. Lemon water and saltine crackers may help with this. If you experience fever, nausea/vomiting, and pain radiating to your back this may be a sign of pancreatitis. Please go to the emergency room if this occurs.      I am sending the Wegovy to your pharmacy. This will start the prior authorization process. My office takes care of that. It can take up to 3 weeks to process.     Start Wegovy 0.25 mg subcutaneously once a week for 4 weeks, then increase to 0.5 mg subcutaneously each week. A few days after you take the first pen of the starting dose of 0.25 mg, please message me with an update regarding how you are feeling. As long as you are tolerating it well, I will submit the next dose of 0.5 mg to the pharmacy, as we will also likely need to do another prior authorization for that dose.     - Side effects of Wegovy: nausea, vomiting, diarrhea, and constipation. If severe abdominal pain develops, stop Wegovy and go to the ER, as this could be pancreatitis. Monitor heart rate while on Wegovy and if resting heart rate greater than 100 beats per minute, patient will notify me.   - If you need to have surgery or another procedure, such as an upper endoscopy or colonoscopy, please contact my office as often medications like Wegovy need to be held for a certain amount of time prior to a procedure.      GLP-1 Managing Side Effects Tips:  - focus on small frequent meals  - moderate sugar and fat intake  - change the injection site (abdomen --> thigh--> back of arm)  - eat protein, crackers, mints and dawson-based drinks  - nighttime injections  - drink plenty of water  - consider fiber supplements like miralax    Tips for Nausea:  - Don't drink and eat simultaneously: separate fluids 30-60 minutes before and after meals when experiencing nausea or if you notice you become full quickly  - Choose mild  smelling foods- strong smells can exacerbate nausea  - Shayy and peppermint- consider drinking shayy or peppermint tea, which can help alleviate symptoms  - Eat- don't skip meals, if you have nausea, it is understandable that you may not feel like eating. However, skipping meals is generally not recommended as this can lead to lower blood sugar and fatigue. Furthermore, it is essential to consume adequate protein during weight loss. You can opt for a protein shake, a meal replacement supplement, bone broth or soup.     Tips for Constipation:   - Drink plenty of water  - Eat food with a high fiber content: increase your fiber intake by consuming these types of foods:   Eat more whole grain products like barley, oats, farro, brown or wild rice and quinoa.    Look for choices with 100% whole wheat, rye, oats or bran as the first ingredient listed    Check nutrition fact labels and choose products with 4gm of dietary fiber or more per serving   Add more beans to your diet   Eat more fresh fruits and vegetables with skins on them    Exercise- increase physical activity as it helps stimulate gastric mobility, which moves stool through the digestive tract     Physical Activity RX:   Continue resistance training 4x per week   Overall goal of 150-200 mins per week of physical activity   As your knee pain heals we will work on increasing cardio activities   - Nutrition RX:   Please continue to meet with the dietitian team    Work on protein goals- 30 grams per meal    Increase fiber- 28-30 grams per day   Increase water intake 64-74 oz daily

## 2024-11-20 ENCOUNTER — HOSPITAL ENCOUNTER (OUTPATIENT)
Dept: RADIOLOGY | Facility: HOSPITAL | Age: 54
Discharge: HOME | End: 2024-11-20
Attending: ORTHOPAEDIC SURGERY
Payer: COMMERCIAL

## 2024-11-20 DIAGNOSIS — M23.91 INTERNAL DERANGEMENT OF RIGHT KNEE: ICD-10-CM

## 2024-11-20 PROCEDURE — 73721 MRI JNT OF LWR EXTRE W/O DYE: CPT

## 2024-11-21 ENCOUNTER — TELEPHONE (OUTPATIENT)
Age: 54
End: 2024-11-21

## 2024-11-21 NOTE — TELEPHONE ENCOUNTER
PA for Wegovy APPROVED     Date(s) approved 10/22/2024-6/19/2025    Case #    Patient advised by          [][employer] Message  [x]Phone call   []LMOM  []L/M to call office as no active Communication consent on file  []Unable to leave detailed message as VM not approved on Communication consent       Pharmacy advised by    []Fax  [x]Phone call    Approval letter scanned into [employer] -On covermymeds

## 2024-11-21 NOTE — TELEPHONE ENCOUNTER
PA for Wegovy SUBMITTED to [employer]    via    [x]CMM-KEY: [id number]  []Surescripts-Case ID #    []Availity-Auth ID #  NDC #    []Faxed to plan   []Other website    []Phone call Case ID #      []PA sent as URGENT    All office notes, labs and other pertaining documents and studies sent. Clinical questions answered. Awaiting determination from insurance company.     Turnaround time for your insurance to make a decision on your Prior Authorization can take 7-21 business days.

## 2024-11-22 DIAGNOSIS — N95.1 MENOPAUSAL VASOMOTOR SYNDROME: ICD-10-CM

## 2024-11-22 RX ORDER — PAROXETINE 10 MG/1
10 TABLET, FILM COATED ORAL DAILY
Qty: 90 TABLET | Refills: 1 | Status: SHIPPED | OUTPATIENT
Start: 2024-11-22

## 2024-11-22 NOTE — TELEPHONE ENCOUNTER
Spoke with patient and notified her of stock issue at [facility]. Recommended contacting other pharmacies to locate medication and return call when she finds one.

## 2024-11-22 NOTE — TELEPHONE ENCOUNTER
Patient called in because she was notified of an approval for her PA. Patient reached out to pharmacy yesterday afternoon and they told her that there was not a PA on file. Patient reached out to her insurance who states PA may have been submitted under wrong ID number. Rx ID number should be [id number]. If needed RX Bin:[id number] and PCN: [id number]. Patient is asking for a call back once looked into further. Patient can be reached at [phone]

## 2024-11-29 ENCOUNTER — OFFICE VISIT (OUTPATIENT)
Dept: OBGYN CLINIC | Facility: CLINIC | Age: 54
End: 2024-11-29
Payer: COMMERCIAL

## 2024-11-29 ENCOUNTER — PREP FOR PROCEDURE (OUTPATIENT)
Dept: OBGYN CLINIC | Facility: CLINIC | Age: 54
End: 2024-11-29

## 2024-11-29 ENCOUNTER — APPOINTMENT (OUTPATIENT)
Dept: LAB | Facility: HOSPITAL | Age: 54
End: 2024-11-29
Attending: ORTHOPAEDIC SURGERY
Payer: COMMERCIAL

## 2024-11-29 VITALS
BODY MASS INDEX: 32.3 KG/M2 | SYSTOLIC BLOOD PRESSURE: 111 MMHG | HEART RATE: 81 BPM | WEIGHT: 201 LBS | DIASTOLIC BLOOD PRESSURE: 77 MMHG | HEIGHT: 66 IN

## 2024-11-29 DIAGNOSIS — S83.241A OTHER TEAR OF MEDIAL MENISCUS, CURRENT INJURY, RIGHT KNEE, INITIAL ENCOUNTER: Primary | ICD-10-CM

## 2024-11-29 DIAGNOSIS — S83.241A OTHER TEAR OF MEDIAL MENISCUS, CURRENT INJURY, RIGHT KNEE, INITIAL ENCOUNTER: ICD-10-CM

## 2024-11-29 LAB
BASOPHILS # BLD AUTO: 0.05 THOUSANDS/ΜL (ref 0–0.1)
BASOPHILS NFR BLD AUTO: 1 % (ref 0–1)
EOSINOPHIL # BLD AUTO: 0.22 THOUSAND/ΜL (ref 0–0.61)
EOSINOPHIL NFR BLD AUTO: 3 % (ref 0–6)
ERYTHROCYTE [DISTWIDTH] IN BLOOD BY AUTOMATED COUNT: 13.5 % (ref 11.6–15.1)
HCT VFR BLD AUTO: 42.9 % (ref 34.8–46.1)
HGB BLD-MCNC: 13.8 G/DL (ref 11.5–15.4)
IMM GRANULOCYTES # BLD AUTO: 0.01 THOUSAND/UL (ref 0–0.2)
IMM GRANULOCYTES NFR BLD AUTO: 0 % (ref 0–2)
LYMPHOCYTES # BLD AUTO: 1.34 THOUSANDS/ΜL (ref 0.6–4.47)
LYMPHOCYTES NFR BLD AUTO: 21 % (ref 14–44)
MCH RBC QN AUTO: 28.4 PG (ref 26.8–34.3)
MCHC RBC AUTO-ENTMCNC: 32.2 G/DL (ref 31.4–37.4)
MCV RBC AUTO: 88 FL (ref 82–98)
MONOCYTES # BLD AUTO: 0.55 THOUSAND/ΜL (ref 0.17–1.22)
MONOCYTES NFR BLD AUTO: 9 % (ref 4–12)
NEUTROPHILS # BLD AUTO: 4.32 THOUSANDS/ΜL (ref 1.85–7.62)
NEUTS SEG NFR BLD AUTO: 66 % (ref 43–75)
NRBC BLD AUTO-RTO: 0 /100 WBCS
PLATELET # BLD AUTO: 209 THOUSANDS/UL (ref 149–390)
PMV BLD AUTO: 11.4 FL (ref 8.9–12.7)
RBC # BLD AUTO: 4.86 MILLION/UL (ref 3.81–5.12)
WBC # BLD AUTO: 6.49 THOUSAND/UL (ref 4.31–10.16)

## 2024-11-29 PROCEDURE — 36415 COLL VENOUS BLD VENIPUNCTURE: CPT

## 2024-11-29 PROCEDURE — 99214 OFFICE O/P EST MOD 30 MIN: CPT | Performed by: ORTHOPAEDIC SURGERY

## 2024-11-29 PROCEDURE — 85025 COMPLETE CBC W/AUTO DIFF WBC: CPT

## 2024-11-29 RX ORDER — CHLORHEXIDINE GLUCONATE ORAL RINSE 1.2 MG/ML
15 SOLUTION DENTAL ONCE
Status: CANCELLED | OUTPATIENT
Start: 2024-11-29 | End: 2024-11-29

## 2024-11-29 RX ORDER — CEFAZOLIN SODIUM 2 G/50ML
2000 SOLUTION INTRAVENOUS ONCE
Status: CANCELLED | OUTPATIENT
Start: 2024-11-29 | End: 2024-11-29
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
## 2024-12-02 ENCOUNTER — HOSPITAL ENCOUNTER (OUTPATIENT)
Dept: RADIOLOGY | Facility: HOSPITAL | Age: 54
Discharge: HOME | End: 2024-12-02
Attending: STUDENT IN AN ORGANIZED HEALTH CARE EDUCATION/TRAINING PROGRAM
Payer: COMMERCIAL

## 2024-12-02 ENCOUNTER — RESULTS FOLLOW-UP (OUTPATIENT)
Dept: OBGYN CLINIC | Facility: CLINIC | Age: 54
End: 2024-12-02

## 2024-12-02 DIAGNOSIS — Z12.31 ENCOUNTER FOR SCREENING MAMMOGRAM FOR BREAST CANCER: ICD-10-CM

## 2024-12-02 DIAGNOSIS — Z80.3 FAMILY HISTORY OF BREAST CANCER: ICD-10-CM

## 2024-12-02 PROCEDURE — C8908 MRI W/O FOL W/CONT, BREAST,: HCPCS

## 2024-12-02 PROCEDURE — C8937 CAD BREAST MRI: HCPCS

## 2024-12-02 PROCEDURE — A9585 GADOBUTROL INJECTION: HCPCS | Performed by: STUDENT IN AN ORGANIZED HEALTH CARE EDUCATION/TRAINING PROGRAM

## 2024-12-02 PROCEDURE — G1004 CDSM NDSC: HCPCS

## 2024-12-02 RX ORDER — GADOBUTROL 604.72 MG/ML
9 INJECTION INTRAVENOUS
Status: COMPLETED | OUTPATIENT
Start: 2024-12-02 | End: 2024-12-02

## 2024-12-02 RX ADMIN — GADOBUTROL 9 ML: 604.72 INJECTION INTRAVENOUS at 10:22

## 2024-12-02 NOTE — PRE-PROCEDURE INSTRUCTIONS
Pre-Surgery Instructions:   Medication Instructions    Aspirin Low Dose 81 MG chewable tablet Stop taking 7 days prior to surgery.    Calcium Carbonate-Vit D-Min (CALCIUM 1200 PO) Stop taking 7 days prior to surgery.    Magnesium Glycinate 100 MG CAPS Stop taking 7 days prior to surgery.    Omega-3 Fatty Acids (Omega-3 Fish Oil) 1000 MG CAPS Stop taking 7 days prior to surgery.    pantoprazole (PROTONIX) 40 mg tablet Uses PRN- OK to take day of surgery    PARoxetine (PAXIL) 10 mg tablet Take night before surgery   Medication instructions for day surgery reviewed. Please use only a sip of water to take your instructed medications. Avoid all over the counter vitamins, supplements and NSAIDS for one week prior to surgery per anesthesia guidelines. Tylenol is ok to take as needed.     You will receive a call one business day prior to surgery with an arrival time and hospital directions. If your surgery is scheduled on a Monday, the hospital will be calling you on the Friday prior to your surgery. If you have not heard from anyone by 8pm, please call the hospital supervisor through the hospital  at [phone]. ([place] [phone] or [place] [phone]).    Do not eat or drink anything after midnight the night before your surgery, including candy, mints, lifesavers, or chewing gum. Do not drink alcohol 24hrs before your surgery. Try not to smoke at least 24hrs before your surgery.       Follow the pre surgery showering instructions as listed in the “My Surgical Experience Booklet” or otherwise provided by your surgeon's office. Do not use a blade to shave the surgical area 1 week before surgery. It is okay to use a clean electric clippers up to 24 hours before surgery. Do not apply any lotions, creams, including makeup, cologne, deodorant, or perfumes after showering on the day of your surgery. Do not use dry shampoo, hair spray, hair gel, or any type of hair products.     No contact lenses, eye make-up,  or artificial eyelashes. Remove nail polish, including gel polish, and any artificial, gel, or acrylic nails if possible. Remove all jewelry including rings and body piercing jewelry.     Wear causal clothing that is easy to take on and off. Consider your type of surgery.    Keep any valuables, jewelry, piercings at home. Please bring any specially ordered equipment (sling, braces) if indicated.    Arrange for a responsible person to drive you to and from the hospital on the day of your surgery. Please confirm the visitor policy for the day of your procedure when you receive your phone call with an arrival time.     Call the surgeon's office with any new illnesses, exposures, or additional questions prior to surgery.    Please reference your “My Surgical Experience Booklet” for additional information to prepare for your upcoming surgery.

## 2024-12-03 ENCOUNTER — ANESTHESIA EVENT (OUTPATIENT)
Dept: PERIOP | Facility: HOSPITAL | Age: 54
End: 2024-12-03
Payer: COMMERCIAL

## 2024-12-03 PROBLEM — Z90.710 HISTORY OF HYSTERECTOMY: Status: ACTIVE | Noted: 2024-12-03

## 2024-12-03 NOTE — ANESTHESIA PREPROCEDURE EVALUATION
Procedure:  Knee Arthroscopy partial medial meniscectomy (Right: Knee)    Relevant Problems   ANESTHESIA   (+) Delayed emergence from anesthesia      CARDIO   (+) Holosystolic murmur   (+) Internal hemorrhoids      GI/HEPATIC   (+) Gastroesophageal reflux disease      GYN   (+) History of hysterectomy      FEN/Gastrointestinal   (+) Irritable bowel syndrome      Other   (+) Prediabetes        Physical Exam    Airway    Mallampati score: II  TM Distance: >3 FB  Neck ROM: full     Dental       Cardiovascular  Rhythm: regular, Rate: normal    Pulmonary   Breath sounds clear to auscultation    Other Findings  post-pubertal.      Anesthesia Plan  ASA Score- 2     Anesthesia Type- general with ASA Monitors.         Additional Monitors:     Airway Plan: LMA.    Comment: GA with LMA; pre-procedure adductor canal block.       Plan Factors-    Chart reviewed.        Patient is not a current smoker.              Induction- intravenous.    Postoperative Plan- Plan for postoperative opioid use.     Perioperative Resuscitation Plan -  The DNR status was discussed with the patient and/or POA, and Level 2 code status (DNR but accepts intubation) will be maintained throughout the perioperative period.      Informed Consent- Anesthetic plan and risks discussed with patient.  I personally reviewed this patient with the CRNA. Discussed and agreed on the Anesthesia Plan with the CRNA..

## 2024-12-04 ENCOUNTER — HOSPITAL ENCOUNTER (OUTPATIENT)
Facility: HOSPITAL | Age: 54
Setting detail: OUTPATIENT SURGERY
Discharge: HOME | End: 2024-12-04
Attending: ORTHOPAEDIC SURGERY | Admitting: ORTHOPAEDIC SURGERY
Payer: COMMERCIAL

## 2024-12-04 ENCOUNTER — ANESTHESIA (OUTPATIENT)
Dept: PERIOP | Facility: HOSPITAL | Age: 54
End: 2024-12-04
Payer: COMMERCIAL

## 2024-12-04 VITALS
OXYGEN SATURATION: 100 % | HEART RATE: 58 BPM | SYSTOLIC BLOOD PRESSURE: 130 MMHG | RESPIRATION RATE: 18 BRPM | DIASTOLIC BLOOD PRESSURE: 66 MMHG | WEIGHT: 195.11 LBS | BODY MASS INDEX: 31.36 KG/M2 | HEIGHT: 66 IN | TEMPERATURE: 97.9 F

## 2024-12-04 DIAGNOSIS — Z87.828 S/P ARTHROSCOPIC PARTIAL MEDIAL MENISCECTOMY OF RIGHT KNEE: Primary | ICD-10-CM

## 2024-12-04 DIAGNOSIS — Z47.89 AFTERCARE FOLLOWING SURGERY OF THE MUSCULOSKELETAL SYSTEM: ICD-10-CM

## 2024-12-04 DIAGNOSIS — Z98.890 S/P ARTHROSCOPIC PARTIAL MEDIAL MENISCECTOMY OF RIGHT KNEE: Primary | ICD-10-CM

## 2024-12-04 PROCEDURE — 29881 ARTHRS KNE SRG MNISECTMY M/L: CPT | Performed by: ORTHOPAEDIC SURGERY

## 2024-12-04 PROCEDURE — NC001 PR NO CHARGE: Performed by: ORTHOPAEDIC SURGERY

## 2024-12-04 PROCEDURE — 29881 ARTHRS KNE SRG MNISECTMY M/L: CPT

## 2024-12-04 PROCEDURE — C9290 INJ, BUPIVACAINE LIPOSOME: HCPCS | Performed by: ANESTHESIOLOGY

## 2024-12-04 RX ORDER — SODIUM CHLORIDE, SODIUM LACTATE, POTASSIUM CHLORIDE, CALCIUM CHLORIDE 600; 310; 30; 20 MG/100ML; MG/100ML; MG/100ML; MG/100ML
75 INJECTION, SOLUTION INTRAVENOUS CONTINUOUS
Status: DISCONTINUED | OUTPATIENT
Start: 2024-12-04 | End: 2024-12-04 | Stop reason: HOSPADM

## 2024-12-04 RX ORDER — ACETAMINOPHEN 325 MG/1
650 TABLET ORAL EVERY 6 HOURS PRN
Status: CANCELLED | OUTPATIENT
Start: 2024-12-04

## 2024-12-04 RX ORDER — KETOROLAC TROMETHAMINE 30 MG/ML
INJECTION, SOLUTION INTRAMUSCULAR; INTRAVENOUS AS NEEDED
Status: DISCONTINUED | OUTPATIENT
Start: 2024-12-04 | End: 2024-12-04

## 2024-12-04 RX ORDER — FENTANYL CITRATE/PF 50 MCG/ML
25 SYRINGE (ML) INJECTION
Status: DISCONTINUED | OUTPATIENT
Start: 2024-12-04 | End: 2024-12-04 | Stop reason: HOSPADM

## 2024-12-04 RX ORDER — PROPOFOL 10 MG/ML
INJECTION, EMULSION INTRAVENOUS AS NEEDED
Status: DISCONTINUED | OUTPATIENT
Start: 2024-12-04 | End: 2024-12-04

## 2024-12-04 RX ORDER — ONDANSETRON 2 MG/ML
4 INJECTION INTRAMUSCULAR; INTRAVENOUS EVERY 6 HOURS PRN
Status: CANCELLED | OUTPATIENT
Start: 2024-12-04

## 2024-12-04 RX ORDER — ASPIRIN 81 MG/1
81 TABLET, CHEWABLE ORAL 2 TIMES DAILY
Qty: 84 TABLET | Refills: 0 | Status: CANCELLED | OUTPATIENT
Start: 2024-12-04

## 2024-12-04 RX ORDER — MIDAZOLAM HYDROCHLORIDE 2 MG/2ML
INJECTION, SOLUTION INTRAMUSCULAR; INTRAVENOUS AS NEEDED
Status: DISCONTINUED | OUTPATIENT
Start: 2024-12-04 | End: 2024-12-04

## 2024-12-04 RX ORDER — CEFAZOLIN SODIUM 2 G/50ML
2000 SOLUTION INTRAVENOUS ONCE
Status: COMPLETED | OUTPATIENT
Start: 2024-12-04 | End: 2024-12-04

## 2024-12-04 RX ORDER — ASPIRIN 81 MG/1
81 TABLET, CHEWABLE ORAL 2 TIMES DAILY
Qty: 84 TABLET | Refills: 0 | Status: SHIPPED | OUTPATIENT
Start: 2024-12-04

## 2024-12-04 RX ORDER — LIDOCAINE HYDROCHLORIDE 10 MG/ML
INJECTION, SOLUTION EPIDURAL; INFILTRATION; INTRACAUDAL; PERINEURAL AS NEEDED
Status: DISCONTINUED | OUTPATIENT
Start: 2024-12-04 | End: 2024-12-04

## 2024-12-04 RX ORDER — MAGNESIUM HYDROXIDE 1200 MG/15ML
LIQUID ORAL AS NEEDED
Status: DISCONTINUED | OUTPATIENT
Start: 2024-12-04 | End: 2024-12-04 | Stop reason: HOSPADM

## 2024-12-04 RX ORDER — OXYCODONE HYDROCHLORIDE 5 MG/1
5 TABLET ORAL EVERY 4 HOURS PRN
Status: DISCONTINUED | OUTPATIENT
Start: 2024-12-04 | End: 2024-12-04 | Stop reason: HOSPADM

## 2024-12-04 RX ORDER — ALBUTEROL SULFATE 0.83 MG/ML
2.5 SOLUTION RESPIRATORY (INHALATION) ONCE AS NEEDED
Status: DISCONTINUED | OUTPATIENT
Start: 2024-12-04 | End: 2024-12-04 | Stop reason: HOSPADM

## 2024-12-04 RX ORDER — BUPIVACAINE HYDROCHLORIDE 5 MG/ML
INJECTION, SOLUTION EPIDURAL; INTRACAUDAL AS NEEDED
Status: DISCONTINUED | OUTPATIENT
Start: 2024-12-04 | End: 2024-12-04 | Stop reason: HOSPADM

## 2024-12-04 RX ORDER — HYDROMORPHONE HCL IN WATER/PF 6 MG/30 ML
0.2 PATIENT CONTROLLED ANALGESIA SYRINGE INTRAVENOUS
Status: DISCONTINUED | OUTPATIENT
Start: 2024-12-04 | End: 2024-12-04 | Stop reason: HOSPADM

## 2024-12-04 RX ORDER — ACETAMINOPHEN 500 MG
1000 TABLET ORAL EVERY 8 HOURS
Qty: 60 TABLET | Refills: 0 | Status: SHIPPED | OUTPATIENT
Start: 2024-12-04

## 2024-12-04 RX ORDER — METOCLOPRAMIDE HYDROCHLORIDE 5 MG/ML
10 INJECTION INTRAMUSCULAR; INTRAVENOUS ONCE AS NEEDED
Status: DISCONTINUED | OUTPATIENT
Start: 2024-12-04 | End: 2024-12-04 | Stop reason: HOSPADM

## 2024-12-04 RX ORDER — ONDANSETRON 2 MG/ML
INJECTION INTRAMUSCULAR; INTRAVENOUS AS NEEDED
Status: DISCONTINUED | OUTPATIENT
Start: 2024-12-04 | End: 2024-12-04

## 2024-12-04 RX ORDER — BUPIVACAINE HYDROCHLORIDE 5 MG/ML
INJECTION, SOLUTION EPIDURAL; INTRACAUDAL
Status: COMPLETED | OUTPATIENT
Start: 2024-12-04 | End: 2024-12-04

## 2024-12-04 RX ORDER — OXYCODONE HYDROCHLORIDE 5 MG/1
5 TABLET ORAL EVERY 6 HOURS PRN
Qty: 10 TABLET | Refills: 0 | Status: SHIPPED | OUTPATIENT
Start: 2024-12-04

## 2024-12-04 RX ORDER — CHLORHEXIDINE GLUCONATE ORAL RINSE 1.2 MG/ML
15 SOLUTION DENTAL ONCE
Status: COMPLETED | OUTPATIENT
Start: 2024-12-04 | End: 2024-12-04

## 2024-12-04 RX ORDER — SODIUM CHLORIDE, SODIUM LACTATE, POTASSIUM CHLORIDE, CALCIUM CHLORIDE 600; 310; 30; 20 MG/100ML; MG/100ML; MG/100ML; MG/100ML
INJECTION, SOLUTION INTRAVENOUS CONTINUOUS PRN
Status: DISCONTINUED | OUTPATIENT
Start: 2024-12-04 | End: 2024-12-04

## 2024-12-04 RX ORDER — DEXAMETHASONE SODIUM PHOSPHATE 4 MG/ML
INJECTION, SOLUTION INTRA-ARTICULAR; INTRALESIONAL; INTRAMUSCULAR; INTRAVENOUS; SOFT TISSUE AS NEEDED
Status: DISCONTINUED | OUTPATIENT
Start: 2024-12-04 | End: 2024-12-04

## 2024-12-04 RX ORDER — FENTANYL CITRATE 50 UG/ML
INJECTION, SOLUTION INTRAMUSCULAR; INTRAVENOUS AS NEEDED
Status: DISCONTINUED | OUTPATIENT
Start: 2024-12-04 | End: 2024-12-04

## 2024-12-04 RX ORDER — NAPROXEN 500 MG/1
500 TABLET ORAL 2 TIMES DAILY WITH MEALS
Qty: 20 TABLET | Refills: 0 | Status: SHIPPED | OUTPATIENT
Start: 2024-12-04

## 2024-12-04 RX ADMIN — FENTANYL CITRATE 25 MCG: 50 INJECTION INTRAMUSCULAR; INTRAVENOUS at 13:09

## 2024-12-04 RX ADMIN — OXYCODONE HYDROCHLORIDE 5 MG: 5 TABLET ORAL at 13:43

## 2024-12-04 RX ADMIN — CEFAZOLIN SODIUM 2000 MG: 2 SOLUTION INTRAVENOUS at 12:18

## 2024-12-04 RX ADMIN — DEXAMETHASONE SODIUM PHOSPHATE 10 MG: 4 INJECTION, SOLUTION INTRA-ARTICULAR; INTRALESIONAL; INTRAMUSCULAR; INTRAVENOUS; SOFT TISSUE at 12:28

## 2024-12-04 RX ADMIN — FENTANYL CITRATE 50 MCG: 50 INJECTION, SOLUTION INTRAMUSCULAR; INTRAVENOUS at 11:32

## 2024-12-04 RX ADMIN — BUPIVACAINE 20 ML: 13.3 INJECTION, SUSPENSION, LIPOSOMAL INFILTRATION at 11:41

## 2024-12-04 RX ADMIN — SODIUM CHLORIDE, SODIUM LACTATE, POTASSIUM CHLORIDE, AND CALCIUM CHLORIDE: .6; .31; .03; .02 INJECTION, SOLUTION INTRAVENOUS at 11:30

## 2024-12-04 RX ADMIN — MIDAZOLAM 2 MG: 1 INJECTION INTRAMUSCULAR; INTRAVENOUS at 11:32

## 2024-12-04 RX ADMIN — FENTANYL CITRATE 50 MCG: 50 INJECTION, SOLUTION INTRAMUSCULAR; INTRAVENOUS at 12:37

## 2024-12-04 RX ADMIN — KETOROLAC TROMETHAMINE 30 MG: 30 INJECTION, SOLUTION INTRAMUSCULAR at 12:49

## 2024-12-04 RX ADMIN — PROPOFOL 150 MG: 10 INJECTION, EMULSION INTRAVENOUS at 12:21

## 2024-12-04 RX ADMIN — LIDOCAINE HYDROCHLORIDE 50 MG: 10 INJECTION, SOLUTION EPIDURAL; INFILTRATION; INTRACAUDAL; PERINEURAL at 12:21

## 2024-12-04 RX ADMIN — SODIUM CHLORIDE, SODIUM LACTATE, POTASSIUM CHLORIDE, AND CALCIUM CHLORIDE 75 ML/HR: .6; .31; .03; .02 INJECTION, SOLUTION INTRAVENOUS at 10:20

## 2024-12-04 RX ADMIN — BUPIVACAINE HYDROCHLORIDE 5 ML: 5 INJECTION, SOLUTION EPIDURAL; INTRACAUDAL; PERINEURAL at 11:41

## 2024-12-04 RX ADMIN — CHLORHEXIDINE GLUCONATE 15 ML: 1.2 RINSE ORAL at 10:21

## 2024-12-04 RX ADMIN — ONDANSETRON 4 MG: 2 INJECTION INTRAMUSCULAR; INTRAVENOUS at 12:28

## 2024-12-04 NOTE — ANESTHESIA PROCEDURE NOTES
Peripheral Block    Patient location during procedure: holding area  Start time: 12/4/2024 11:41 AM  Reason for block: procedure for pain, at surgeon's request and post-op pain management  Staffing  Performed by: Geo Serrano MD  Authorized by: Geo Serrano MD    Preanesthetic Checklist  Completed: patient identified, IV checked, site marked, risks and benefits discussed, surgical consent, monitors and equipment checked, pre-op evaluation and timeout performed  Peripheral Block  Patient position: supine  Prep: ChloraPrep  Patient monitoring: continuous pulse oximetry and heart rate  Block type: Adductor Canal  Laterality: right  Injection technique: single-shot  Procedures: ultrasound guided, Ultrasound guidance required for the procedure to increase accuracy and safety of medication placement and decrease risk of complications.  Ultrasound permanent image saved  bupivacaine (PF) (MARCAINE) 0.5 % injection 20 mL - Perineural   5 mL - 12/4/2024 11:41:00 AM  bupivacaine liposomal (EXPAREL) 1.3 % injection 20 mL - Perineural   20 mL - 12/4/2024 11:41:00 AM  Needle  Needle type: Stimuplex   Needle gauge: 22 G  Needle length: 4 in  Needle localization: ultrasound guidance  Assessment  Injection assessment: frequent aspiration, injected with ease, negative aspiration, no symptoms of intraneural/intravenous injection, no paresthesia on injection, negative for heart rate change, needle tip visualized at all times and incremental injection  Paresthesia pain: none  Post-procedure:  site cleaned  patient tolerated the procedure well with no immediate complications
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
## 2024-12-04 NOTE — OP NOTE
OPERATIVE REPORT  PATIENT NAME: Leslee Stephenson    :  1970  MRN: [id number]  Pt Location: [facility] OR ROOM 01    SURGERY DATE: 2024    Surgeons and Role:     * Karl Regalado MD - Primary     * Elisabet Story PA-C - Assisting    The presence of Elisabet Story PA-C was required for poisitioning, retraction, assistance, and closure. No qualified resident was available.      Preop Diagnosis:  Other tear of medial meniscus, current injury, right knee, initial encounter [S83.241A]    Post-Op Diagnosis Codes:     * Other tear of medial meniscus, current injury, right knee, initial encounter [S83.241A]    Procedure(s):  Right - Knee Arthroscopy partial medial meniscectomy    Specimen(s):  * No specimens in log *    Estimated Blood Loss:   Minimal    Drains:  * No LDAs found *    Anesthesia Type:   General w/ Regional    Operative Indications:  54-year-old female who presented with pain in the right knee.  She had mild degenerative changes only on x-ray.  We did start a course of nonoperative treatment including physical therapy and injection but this did not significant relieve her symptoms.  MRI was ordered which showed again mild degenerative changes worse in the patellofemoral compartment.  There was a medial meniscus tear with a flipped fragment in the medial gutter.  We did review that surgical intervention at this point would not be guaranteed to remove all of her knee pain given the fact that she does have mild arthritis.  However displaced meniscus tear in the medial recess has been shown to improve significantly with surgical intervention for partial meniscectomy even in the setting of mild degenerative changes.  We discussed risk benefits and alternatives to surgery.  Written consent was signed and we elected to proceed.       Findings:      Arthroscopic evaluation of the right knee revealed the following:     Medial meniscus: Oblique radial tear with a flipped fragment in the medial recess  Medial  femoral condyle: Grade 2 and 3 degenerative changes  Medial tibial plateau: Grade 2 degenerative changes  Anterior cruciate ligament: Normal appearance.  Posterior cruciate ligament: Normal appearance.   Lateral meniscus: Normal  Lateral femoral condyle: Grade 1 degenerative changes  Lateral tibial plateau: Grade 1 degenerative changes  Medial and lateral gutters: No loose bodies.   Patella: Grade 2 and 3 degenerative changes  Trochlea: Grade 1 degenerative changes         Procedure:  In the pre-operative holding area, the patient identified the correct operative extremity and I marked that extremity with my initials. The patient was then brought to the operating room and positioned supine. Following satisfactory induction of anesthesia, the right knee was prepped and draped in the usual sterile fashion for surgical arthroscopy of the right knee. Before any surgical instrumentation was passed to me by the surgical technician, a formalized time-out occurred, which involves the surgeon, circulating nurse, and anesthesia staff all verifying the correct operative extremity. My initials were visible on the prepped and draped operative field.      The anatomic landmarks of the anteromedial and anterolateral portals were marked and these portal sites were injected with local anesthetic.  The limb was exsanguinated using an Esmarch bandage and the tourniquet was inflated to 250 mmHg.  The anterolateral portal was established with a #11 blade. The arthroscope was introduced through this portal. Under direct visualization, the anteromedial portal was established with a localizing needle followed by a scalpel. A probe was then introduced into the anteromedial portal. A systematic diagnostic arthroscopy evaluated the following:  medial compartment, notch, lateral compartment, patellofemoral compartment, medial gutter, and lateral gutter.      The medial meniscus was evaluated and found to have an oblique radial tear that  extended into the red red zone.  There was a flipped fragment in the medial gutter.  This tear had degenerative fraying at the edges and was not repairable.  I used an arthroscopic probe to reduce the flipped fragment out of the medial gutter.  I then used a combination of arthroscopic shaver and biter to debride the meniscus back to a stable rim.  The meniscus remnant was evaluated with the probe and found to be stable with no additional tearing.  I used an arthroscopic shaver to debride frayed chondral tissue and synovitis that extended into the lateral compartment.  All loose bodies were removed from the knee.     There was no additional pathology. All particulate debris was removed. The knee was copiously rinsed and then drained. The portals were closed with an interrupted 3-0 nylon suture. The skin was cleansed with sterile saline and dried before Steri-Strips were applied. Finally, a sterile dressing was secured by Webril and an Ace wrap.  Tourniquet was deflated.    The procedure was well-tolerated.  There is no apparent complications.  The patient emerged from anesthesia was taken recovery room in stable condition.         SIGNATURE: Karl Regalado MD  DATE: December 4, 2024  TIME: 1:19 PM

## 2024-12-04 NOTE — ANESTHESIA POSTPROCEDURE EVALUATION
Post-Op Assessment Note    CV Status:  Stable  Pain Score: 0    Pain management: adequate       Mental Status:  Awake and sleepy   Hydration Status:  Euvolemic   PONV Controlled:  Controlled   Airway Patency:  Patent  Two or more mitigation strategies used for obstructive sleep apnea   Post Op Vitals Reviewed: Yes    No anethesia notable event occurred.    Staff: CRNA           Last Filed PACU Vitals:  Vitals Value Taken Time   Temp 98.6 °F (37 °C) 12/04/24 1305   Pulse 65 12/04/24 1305   /71 12/04/24 1305   Resp 16 12/04/24 1305   SpO2 100 % 12/04/24 1305       Modified Nasrin:  No data recorded

## 2024-12-04 NOTE — ANESTHESIA POSTPROCEDURE EVALUATION
Post-Op Assessment Note    CV Status:  Stable  Pain Score: 2    Pain management: adequate       Mental Status:  Awake   Hydration Status:  Stable   PONV Controlled:  None   Airway Patency:  Patent     Post Op Vitals Reviewed: Yes    No anethesia notable event occurred.    Staff: Anesthesiologist           Last Filed PACU Vitals:  Vitals Value Taken Time   Temp 97.9 °F (36.6 °C) 12/04/24 1320   Pulse 56 12/04/24 1320   /78 12/04/24 1320   Resp 18 12/04/24 1320   SpO2 100 % 12/04/24 1320       Modified Nasrin:  Activity: 2 (12/4/2024  1:20 PM)  Respiration: 2 (12/4/2024  1:20 PM)  Circulation: 2 (12/4/2024  1:20 PM)  Consciousness: 2 (12/4/2024  1:20 PM)  Oxygen Saturation: 2 (12/4/2024  1:20 PM)  Modified Nasrin Score: 10 (12/4/2024  1:20 PM)

## 2024-12-04 NOTE — PERIOPERATIVE NURSING NOTE
Received patient from PACU via stretcher  awake and alert. VSS.Right knee dressing D+I with ice pack to knee. Right pedal pulse +1 palpable. Toes on right foot warm and mobile with good capillary refill. Medicated for level 4 right knee pain now with oxycodone 5mg. Tolerating oral fluids and crackers  well.

## 2024-12-04 NOTE — DISCHARGE INSTR - AVS FIRST PAGE
Postoperative Instructions Following Knee Surgery    MEDICATIONS:  Resume all home medications unless otherwise instructed by your surgeon.  Pain Medication:   Take Tylenol and Naproxen on prescribed schedule for 7 days  Take Oxycodone as needed for severe pain   If you were given a regional anesthetic (nerve block), it is helpful to take your pain medication before the block wear off.    Possible side effects include nausea, constipation, and urinary retention.  If you experience these side effects, please call our office for assistance.  Pain med refills are authorized only during office hours (8am-4pm, Mon-Fri).  Blood Clot Prevention:   Pump your foot up and down 20 times per hour while you are less mobile.  Ambulate with your crutches at least once every hour   Wear a inocencia stocking on the operative leg at all times except for hygiene for 2 weeks following surgery. Tip for smoother placement of stocking - place a plastic bag over the toes/foot then slide the stockings over the foot/ankle and remove the plastic bag. You may need another person to assist you.  Take Aspirin 81 mg twice daily for 6 weeks following surgery.    WOUND CARE:  Keep the dressing clean and dry.  Light drainage may occur the first 2 days postop.  You may remove the dressings and get the incision wet in the shower 72 hours after surgery.  DO NOT remove steri-strips or sutures.  DO NOT immerse the incision under water.  Carefully pat the incision dry.  If there is wound drainage, re-apply a fresh dry gauze dressing.  Please call our office ([phone]) if you experience either of the following:  Sudden increase in swelling, redness, or warmth at the surgical site  Excessive incisional drainage that persists beyond the 3rd day after surgery  Oral temperature greater than 101 degrees, not relieved with Tylenol  Shortness of breath, chest pain, nausea, or any other concerning symptoms    Other pain/swelling control measures:  Cold Therapy:   "Apply ice (20 min on, 20 min off) as often as you feel is necessary. Ice helps with pain.   Elevation:  Elevate the entire leg above heart level.  Place pillows under your ankle to keep your knee straight. This will help with swelling and prevents stiffness   Compression:  Keep an ace wrap on the operative leg until you return to the office. It may be removed to shower as described above.     RANGE OF MOTION:  {POSTOP CARE ROM:62151}    IMMOBILIZATION:  {POSTOP CARE IMMOBILIZATION:74797}    ACTIVITY:   {POSTOP CARE WEIGHTBEARIN}  Using Crutches on Stairs:  Going up, lead with your \"good\" (nonoperative) leg.  Going down, lead with your \"bad\" (operative) leg.  Use a hand rail when available.  Knee Extension:  Place a rolled towel or pillow under your ankle for 20-30 minutes 3-5 times per day.  This will help to maintain full knee extension.  Quad Sets:  Sit or lie with your knee straight.  Tighten your quadriceps (front thigh) muscle.  Hold for 3 seconds, then relax.  Repeat 20 times per hour while awake.    PHYSICAL THERAPY:  Begin therapy 1 TO 3 DAYS*** AFTER SURGERY.  You were given a prescription for therapy at your preoperative office visit or on the day of surgery.  If you do not have physical therapy scheduled yet, please call our office for assistance.      FOLLOW-UP APPOINTMENT:  10-14 days with:    Dr. Moises Regalado MD    [facility]  [address], [address]  [place] [address]    [facility] [address] [address]    Phone:   [phone]   "

## 2024-12-05 ENCOUNTER — TELEPHONE (OUTPATIENT)
Age: 54
End: 2024-12-05

## 2024-12-05 DIAGNOSIS — R92.8 ABNORMAL MRI, BREAST: Primary | ICD-10-CM

## 2024-12-05 NOTE — TELEPHONE ENCOUNTER
Pt called. Pt had MRI breast bilateral done 12/02 order by Dr Anand. Pt stated radiology suggested a MRI assisted biopsy. Results are available for provider. Pt needs order to schedule. Please advise.

## 2024-12-05 NOTE — TELEPHONE ENCOUNTER
Pt contacted and states she did speak to Dr. Anand and order was placed for MRI right breast biopsy.

## 2024-12-09 ENCOUNTER — TELEPHONE (OUTPATIENT)
Age: 54
End: 2024-12-09

## 2024-12-09 NOTE — TELEPHONE ENCOUNTER
PA for Wegovy APPROVED     Date(s) approved 11/9/2024-7/7/2025    Case #    Patient advised by          []Alter[employer]ohart Message  [x]Phone call   []LMOM  []L/M to call office as no active Communication consent on file  []Unable to leave detailed message as VM not approved on Communication consent       Pharmacy advised by    []Fax  [x]Phone call    Approval letter scanned into Media No - On covermymeds

## 2024-12-09 NOTE — TELEPHONE ENCOUNTER
PA for Wegovy SUBMITTED to [employer]    via    [x]CMM-KEY: [id number]  []Surescripts-Case ID #    []Availity-Auth ID #  NDC #    []Faxed to plan   []Other website    []Phone call Case ID #      []PA sent as URGENT    All office notes, labs and other pertaining documents and studies sent. Clinical questions answered. Awaiting determination from insurance company.     Turnaround time for your insurance to make a decision on your Prior Authorization can take 7-21 business days.

## 2024-12-16 ENCOUNTER — OFFICE VISIT (OUTPATIENT)
Dept: OBGYN CLINIC | Facility: CLINIC | Age: 54
End: 2024-12-16

## 2024-12-16 VITALS
HEART RATE: 62 BPM | SYSTOLIC BLOOD PRESSURE: 115 MMHG | HEIGHT: 66 IN | BODY MASS INDEX: 31.34 KG/M2 | OXYGEN SATURATION: 99 % | DIASTOLIC BLOOD PRESSURE: 74 MMHG | WEIGHT: 195 LBS

## 2024-12-16 DIAGNOSIS — Z98.890 S/P ARTHROSCOPIC PARTIAL MEDIAL MENISCECTOMY OF RIGHT KNEE: Primary | ICD-10-CM

## 2024-12-16 DIAGNOSIS — Z87.828 S/P ARTHROSCOPIC PARTIAL MEDIAL MENISCECTOMY OF RIGHT KNEE: Primary | ICD-10-CM

## 2024-12-16 PROCEDURE — 99024 POSTOP FOLLOW-UP VISIT: CPT | Performed by: ORTHOPAEDIC SURGERY

## 2024-12-16 RX ORDER — CHOLECALCIFEROL (VITAMIN D3) 1250 MCG
CAPSULE ORAL
COMMUNITY
Start: 2024-11-01

## 2024-12-16 NOTE — PROGRESS NOTES
SUBJECTIVE:  Patient is a 54 y.o. year old female who presents for follow up now 12 days s/p Knee Arthroscopy partial medial meniscectomy - Right performed on  12/4/2024.  Today patient has soreness about the right knee. She rates her pain 3/10 today. She notes experiencing increased pain yesterday from being on her feet all day. She states she took Tylenol and Naprosyn at night, which seemed to help. She does report feeling as thought the right knee may give out on her a few times a day. She has been completing home exercises for the right knee, which she feels has been helpful. She plans to begin more strengthening this week. She recalls experiencing areas of decreased sensation of the calf, which has been subsiding.      VITALS:  Vitals:    12/16/24 0903   BP: 115/74   Pulse: 62   SpO2: 99%       PHYSICAL EXAMINATION:  General: well developed and well nourished, alert, oriented times 3, and appears comfortable  Psychiatric: Normal    MUSCULOSKELETAL EXAMINATION:    Right Lower Extremity   Incision: Clean, dry, and intact  Range of Motion: 0° to 120° with slight discomfort at end range flexion  Strength: deferred  +EHL/FHL/TA/GS  SILT throughout  Palpable DP pulse       PLAN:    Leslee is a pleasant 54 y.o. female who presents 12 days s/p Knee Arthroscopy partial medial meniscectomy - Right on 12/4/2024. She is doing well postoperatively today. We discussed the feelings of instability she is experiencing should improve as she continues to heal and strengthen the leg after the procedure. She should continue her efforts with home exercises, especially strengthening. She may continue taking Tylenol and Naprosyn as needed for pain control. She will follow-up in 4 weeks for re-evaluation of her right knee.    Scribe Attestation    I,:  Cecilia Brandon am acting as a scribe while in the presence of the attending physician.:       I,:  Karl Regalado MD personally performed the services described in this  documentation    as scribed in my presence.:

## 2024-12-17 ENCOUNTER — CLINICAL SUPPORT (OUTPATIENT)
Dept: BARIATRICS | Facility: CLINIC | Age: 54
End: 2024-12-17
Payer: COMMERCIAL

## 2024-12-17 VITALS — WEIGHT: 194.2 LBS | BODY MASS INDEX: 31.21 KG/M2 | HEIGHT: 66 IN

## 2024-12-17 DIAGNOSIS — R63.5 ABNORMAL WEIGHT GAIN: Primary | ICD-10-CM

## 2024-12-17 PROCEDURE — RECHECK

## 2024-12-17 PROCEDURE — DB3PK

## 2024-12-17 NOTE — PROGRESS NOTES
"Weight Management Medical Nutrition Assessment  Pt is here today for menu planning. Current weight 194.2# which is a 5.5# weight loss since meeting with WMM provider (3% TBW).  Pt was started on Wegovy which is helping to better control her portions and snacking. Reviewed diet recall and she is focusing on protein and likely meeting   80-90gm protein per day. She did have sx on her knee so has been limited in her activity. She is slowly getting back into her exercise regimen.  Pt will f/u with RD in one month.    Patient seen by Medical Provider in past 6 months:  yes  Requested to schedule appointment with Medical Provider: yes    Anthropometric Measurements  Start Weight with RD on 11/11/24 (#): 198.6#  Start wt with provider on 11/19/24:  199.8#  Current weight:  194.2#  TBW % Change from start weight: 3%  IBW: 130# (66\")  Goal Weight (#): 160-170#    Weight Loss History  Previous weight loss attempts: Commercial Programs (Weight Watchers, Parsimotionig, etc.)  Exercise  Self Created Diets (Portion Control, Healthy Food Choices, etc.)    Food and Nutrition Related History  Wake up: wakes 5-5:30   Bed Time: 10pm  Sleep quality: poor-possibly due to hot flashes and the knee pain that is waking.     Dietary Recall  Coffee with an oatmilk creamer  Breakfast: now eating by 7am-protein shake (protein powder Ascent + 1-2 % milk/almond milk/oat milk) + berries OR 1-2 eggs occ with cheese + 1 slices of toast (70cals) OR yogurt with berries + flax/hemp/granola  Snack: -not hungry any more at this time  Lunch: 11:30am-Salad w/homemade vinaigrette or balsalmic with grilled chicken or tuna or jorge a barely there breaded chicken  Snack: yogurt w/berries   Dinner: 6:15-7:30pm:  3oz chicken many different ways or steak/rushing broil +broccoli/asparagus/[place] sprouts + baked potato/3/4 cup starch OR pasta   Snack: maybe just a sf ice pop, but typically not hungry at this time.     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 Was lifting 4 days per week  Frequency: 4 days per week  Physical limitations/barriers to exercise: cardio is limited due to R knee pain going for MRI    Estimated Needs  Ariel [employer] Energy Needs (needs at 198.6#)  BMR: 1518 kcal  Maintenance calories (sedentary): 1821 kcal  1-2#/week loss (sedentary): 821-1321 kcal  1-2#/week loss (light activity): 3431-0706 kcal    Protein: 71-88 grams (1.2-1.5g/kg IBW)  Fluid: 2068ml (35mL/kg IBW)    Nutrition Diagnosis  Yes;    Overweight/obesity  related to Excess energy intake as evidenced by  BMI more than normative standard for age and sex (obesity-grade I 30-34.9)       Nutrition Intervention    Nutrition Prescription  Calories: 1200 kcal  Protein: 75-85 g  Fluid: 2000 ml    Meal Plan (Franko/Pro)  Breakfast: 6am-150 franko protein shake   Snack:  200/20   Lunch: 300cals/21  Snack: 150  Dinner: 400cals/21g  Snack: --    Nutrition Education  Calorie controlled menu  Lean protein food choices  Healthy snack options  Food journaling tips    Nutrition Counseling  Strategies: meal planning, portion sizes, healthy snack choices, hydration, fiber intake, protein intake, exercise, food logging    Monitoring and Evaluation:    Evaluation criteria  Energy Intake  Meet protein needs  Maintain adequate hydration  Monitor weekly weight  Meal planning/preparation  Food journal   Decreased portions at mealtimes and snacks  Physical activity     Barriers to  change:none  Readiness to change: Preparation:  (Getting ready to change)   Comprehension: good  Expected Compliance: good

## 2024-12-30 ENCOUNTER — TELEPHONE (OUTPATIENT)
Age: 54
End: 2024-12-30

## 2024-12-30 NOTE — TELEPHONE ENCOUNTER
How are you tolerating the medication?   [] Nausea  [] Vomiting  [x] Diarrhea in the beginning when did the first shot  [] Asymptomatic  [] Other:    Last visit weight: 194    Current weight: 194    Date of last injection: tomorrow    How many injections do you have left:0    Patient called the RX Refill Line. Message is being forwarded to the office.     Patient is requesting an increase of Wegovy. Currently on 0.25 mg and is doing fine, had diarrhea towards the even when patient did first injection, but fine now.     Please contact patient at [phone] if any issues.

## 2025-01-02 ENCOUNTER — TELEPHONE (OUTPATIENT)
Age: 55
End: 2025-01-02

## 2025-01-02 DIAGNOSIS — E78.5 HYPERLIPIDEMIA: ICD-10-CM

## 2025-01-02 DIAGNOSIS — E66.811 OBESITY, CLASS I, BMI 30-34.9: Primary | ICD-10-CM

## 2025-01-02 DIAGNOSIS — R73.01 IMPAIRED FASTING GLUCOSE: ICD-10-CM

## 2025-01-02 NOTE — TELEPHONE ENCOUNTER
Caller: Leslee     Doctor/Office: Fabricio / [facility]     #: [phone]    Work or school note: Work     Return to work/school date: Is currently out on FMLA and would like to know if she can get a return to work note for Monday 1/6/2024    Please send to Patient portal

## 2025-01-03 ENCOUNTER — HOSPITAL ENCOUNTER (OUTPATIENT)
Dept: RADIOLOGY | Facility: HOSPITAL | Age: 55
Discharge: HOME | End: 2025-01-03
Payer: COMMERCIAL

## 2025-01-03 ENCOUNTER — HOSPITAL ENCOUNTER (OUTPATIENT)
Dept: RADIOLOGY | Facility: HOSPITAL | Age: 55
Discharge: HOME | End: 2025-01-03
Attending: STUDENT IN AN ORGANIZED HEALTH CARE EDUCATION/TRAINING PROGRAM
Payer: COMMERCIAL

## 2025-01-03 VITALS — HEART RATE: 60 BPM | DIASTOLIC BLOOD PRESSURE: 75 MMHG | SYSTOLIC BLOOD PRESSURE: 148 MMHG

## 2025-01-03 DIAGNOSIS — R92.8 ABNORMAL MRI, BREAST: ICD-10-CM

## 2025-01-03 PROCEDURE — 88341 IMHCHEM/IMCYTCHM EA ADD ANTB: CPT | Performed by: PATHOLOGY

## 2025-01-03 PROCEDURE — A4648 IMPLANTABLE TISSUE MARKER: HCPCS

## 2025-01-03 PROCEDURE — 88305 TISSUE EXAM BY PATHOLOGIST: CPT | Performed by: PATHOLOGY

## 2025-01-03 PROCEDURE — 88342 IMHCHEM/IMCYTCHM 1ST ANTB: CPT | Performed by: PATHOLOGY

## 2025-01-03 PROCEDURE — A9585 GADOBUTROL INJECTION: HCPCS | Performed by: STUDENT IN AN ORGANIZED HEALTH CARE EDUCATION/TRAINING PROGRAM

## 2025-01-03 PROCEDURE — 19085 BX BREAST 1ST LESION MR IMAG: CPT

## 2025-01-03 RX ORDER — GADOBUTROL 604.72 MG/ML
9 INJECTION INTRAVENOUS
Status: COMPLETED | OUTPATIENT
Start: 2025-01-03 | End: 2025-01-03

## 2025-01-03 RX ORDER — LIDOCAINE HYDROCHLORIDE AND EPINEPHRINE BITARTRATE 20; .01 MG/ML; MG/ML
20 INJECTION, SOLUTION SUBCUTANEOUS ONCE
Status: COMPLETED | OUTPATIENT
Start: 2025-01-03 | End: 2025-01-03

## 2025-01-03 RX ORDER — LIDOCAINE HYDROCHLORIDE 10 MG/ML
5 INJECTION, SOLUTION EPIDURAL; INFILTRATION; INTRACAUDAL; PERINEURAL ONCE
Status: COMPLETED | OUTPATIENT
Start: 2025-01-03 | End: 2025-01-03

## 2025-01-03 RX ADMIN — GADOBUTROL 9 ML: 604.72 INJECTION INTRAVENOUS at 08:32

## 2025-01-03 RX ADMIN — LIDOCAINE HYDROCHLORIDE,EPINEPHRINE BITARTRATE 20 ML: 20; .01 INJECTION, SOLUTION INFILTRATION; PERINEURAL at 08:18

## 2025-01-03 RX ADMIN — LIDOCAINE HYDROCHLORIDE 5 ML: 10 INJECTION, SOLUTION EPIDURAL; INFILTRATION; INTRACAUDAL; PERINEURAL at 08:17

## 2025-01-03 NOTE — PROGRESS NOTES
Procedure type:    _____ Ultrasound guided   _____ Stereotactic    __x____ MRI Guided    Breast:    _____ Left Breast biopsy __X___ Right Breast biopsy    Time-out called @ 9830 and procedure confirmed with patient, myself Mali Smith PCA, radiologist Eloisa Buenrostro MD, Namita Hughes RT(MR), and Franchesca Motta  RT(MR).      Location: 9:00    Needle: 9G x 14cm     # of passes: 7    Clip: stoplight    Performed by: Dr Porter DORAN    Pressure held for 5 minutes by: Mali Smith    Steri Strips:    __x___ yes _____ no    Band aid:    _____ yes __x___ no  *Gauze and tape in place above steri-strip adhesive bandages at breast biopsy incision site.    Tolerated procedure:    __x___ yes _____ no

## 2025-01-03 NOTE — DISCHARGE INSTR - OTHER ORDERS
POST LARGE CORE BREAST BIOPSY PROCEDURE: PATIENT INFORMATION      Place an ice pack inside your bra over the top of the gauze dressing every hour for 20 minutes (20 minutes on, 60 minutes off). Do this until bedtime. The ice pack should be used whether pain is or is not present, as it significantly reduces the chance for bruising, bleeding, or hematoma development at home after your procedure. Please use as instructed.    Do not shower or bathe until the following morning Saturday 01/04/2025 @ 9am.    You may shower/bathe your breast carefully with the steri-strips in place.  Be careful not to loosen them.  The steri-strips should remain in place 3-5 days to allow adequate time for your body to heal the breast biopsy incision site. If steri-strips have not fallen off on their own by Tuesday evening 01/07/2025, it would be appropriate to remove them.    You may have mild discomfort, and you may have some bruising where the needle entered the skin. Following a breast biopsy, it can be very common to have bruising around the biopsy site & in some cases the underside of the breast due to positioning during the procedure. This should clear within 1-2 weeks time post-procedure.    If you need medicine for discomfort, take acetaminophen products such as Tylenol. You may also take Advil or Motrin products. Avoid using any aspirin based products (avoid Aleve, Avoid Naproxen), as these medications can increase the risk for bleeding/ hematoma development at breast biopsy site.    Do not participate in strenuous activities such as-tennis, aerobics, skiing, weight lifting > 10 lbs, etc. for 24 hours. Refrain from swimming/soaking until biopsy incision is fully healed to reduce any risk for infection.    Wearing a bra for sleeping may be more comfortable for the first 24-48 hours.    Watch for continued bleeding, pain or fever over 101. If any of these symptoms occur, please contact our breast nurse navigator at the location  where your biopsy was performed.    During normal business hours (7:30 am-4:00 pm) please call the nurse navigator at the site where your   procedure was performed:    [facility]/ [place]:  [phone], [phone] or [phone]  [facility]:  Sheree Patel Radiology RN P# [phone]         or      [facility] P# [phone]              After 4 PM - please call your physician or go to the nearest Emergency Department location.          9.         The final results of your biopsy are usually available within one week.

## 2025-01-06 ENCOUNTER — TELEPHONE (OUTPATIENT)
Dept: RADIOLOGY | Facility: HOSPITAL | Age: 55
End: 2025-01-06

## 2025-01-08 ENCOUNTER — RESULTS FOLLOW-UP (OUTPATIENT)
Dept: OBGYN CLINIC | Facility: CLINIC | Age: 55
End: 2025-01-08

## 2025-01-08 ENCOUNTER — TELEPHONE (OUTPATIENT)
Dept: RADIOLOGY | Facility: HOSPITAL | Age: 55
End: 2025-01-08

## 2025-01-08 PROCEDURE — 88305 TISSUE EXAM BY PATHOLOGIST: CPT | Performed by: PATHOLOGY

## 2025-01-08 PROCEDURE — 88342 IMHCHEM/IMCYTCHM 1ST ANTB: CPT | Performed by: PATHOLOGY

## 2025-01-08 PROCEDURE — 88341 IMHCHEM/IMCYTCHM EA ADD ANTB: CPT | Performed by: PATHOLOGY

## 2025-01-13 ENCOUNTER — OFFICE VISIT (OUTPATIENT)
Dept: OBGYN CLINIC | Facility: CLINIC | Age: 55
End: 2025-01-13

## 2025-01-13 VITALS — WEIGHT: 192 LBS | BODY MASS INDEX: 30.86 KG/M2 | HEIGHT: 66 IN

## 2025-01-13 DIAGNOSIS — Z98.890 S/P ARTHROSCOPIC PARTIAL MEDIAL MENISCECTOMY OF RIGHT KNEE: Primary | ICD-10-CM

## 2025-01-13 DIAGNOSIS — Z87.828 S/P ARTHROSCOPIC PARTIAL MEDIAL MENISCECTOMY OF RIGHT KNEE: Primary | ICD-10-CM

## 2025-01-13 DIAGNOSIS — Z47.89 AFTERCARE FOLLOWING SURGERY OF THE MUSCULOSKELETAL SYSTEM: ICD-10-CM

## 2025-01-13 PROCEDURE — 99024 POSTOP FOLLOW-UP VISIT: CPT | Performed by: ORTHOPAEDIC SURGERY

## 2025-01-13 NOTE — PROGRESS NOTES
SUBJECTIVE:  Patient is a 54 y.o. year old female who presents for follow up now nearly 6 weeks s/p Knee Arthroscopy partial medial meniscectomy - Right performed on  12/4/2024.  Today patient has no significant pain about the right knee. She states her pain is mainly activity-related when she does have pain. She states this can be sharp and located medially. She also notes activity-related swelling. Her pain and swelling resolves on its own with ice, Tylenol, or Naproxen. She has been completing home exercises, working on strengthening. She states her feelings of instability about the right knee are less frequent than before..       PHYSICAL EXAMINATION:  General: well developed and well nourished, alert, oriented times 3, and appears comfortable  Psychiatric: Normal    MUSCULOSKELETAL EXAMINATION:    Right Lower Extremity   Incision: Well-healed, Clean, dry, and intact  Range of Motion: 0° to 130°  Strength: improving quadriceps bulk  +EHL/FHL/TA/GS  SILT throughout  Palpable DP pulse       PLAN:    Leslee is a pleasant 54 y.o. female who presents nearly 6 weeks s/p Knee Arthroscopy partial medial meniscectomy - Right on 12/4/2024. She is doing well postoperatively. We discussed the activity-related pain and swelling is to be expected this soon after the procedure. We did discuss there were mild degenerative changes present in the right knee prior to surgery; therefore, it is possible she could continue to have some pain and swelling intermittently about the right knee. She should continue her efforts with home exercises, focusing on strengthening the right leg. She does have a trip to the [place] [employer] [place] planned for the fall. We can consider potential corticosteroid injections for either knee closer to the trip if she is experiencing pain. She may follow-up on an as needed basis for her bilateral knees.    Scribe Attestation    I,:  Cecilia Brandon am acting as a scribe while in the presence of the attending  physician.:       I,:  Karl Regalado MD personally performed the services described in this documentation    as scribed in my presence.:

## 2025-01-21 ENCOUNTER — OFFICE VISIT (OUTPATIENT)
Age: 55
End: 2025-01-21
Payer: COMMERCIAL

## 2025-01-21 VITALS
HEIGHT: 66 IN | BODY MASS INDEX: 31.15 KG/M2 | RESPIRATION RATE: 16 BRPM | SYSTOLIC BLOOD PRESSURE: 118 MMHG | HEART RATE: 69 BPM | TEMPERATURE: 97.3 F | WEIGHT: 193.8 LBS | DIASTOLIC BLOOD PRESSURE: 70 MMHG

## 2025-01-21 DIAGNOSIS — R73.01 IMPAIRED FASTING GLUCOSE: ICD-10-CM

## 2025-01-21 DIAGNOSIS — E66.811 OBESITY, CLASS I, BMI 30-34.9: Primary | ICD-10-CM

## 2025-01-21 DIAGNOSIS — E78.5 HYPERLIPIDEMIA: ICD-10-CM

## 2025-01-21 PROCEDURE — 99213 OFFICE O/P EST LOW 20 MIN: CPT | Performed by: PHYSICIAN ASSISTANT

## 2025-01-21 NOTE — PATIENT INSTRUCTIONS
GLP-1 Managing Side Effects Tips:  - focus on small frequent meals  - moderate sugar and fat intake  - change the injection site (abdomen --> thigh--> back of arm)  - eat protein, crackers, mints and shayy-based drinks  - nighttime injections  - drink plenty of water  - consider fiber supplements like miralax    Tips for Nausea:  - Don't drink and eat simultaneously: separate fluids 30-60 minutes before and after meals when experiencing nausea or if you notice you become full quickly  - Choose mild smelling foods- strong smells can exacerbate nausea  - Shayy and peppermint- consider drinking shayy or peppermint tea, which can help alleviate symptoms  - Eat- don't skip meals, if you have nausea, it is understandable that you may not feel like eating. However, skipping meals is generally not recommended as this can lead to lower blood sugar and fatigue. Furthermore, it is essential to consume adequate protein during weight loss. You can opt for a protein shake, a meal replacement supplement, bone broth or soup.     Tips for Constipation:   - Drink plenty of water  - Eat food with a high fiber content: increase your fiber intake by consuming these types of foods:   Eat more whole grain products like barley, oats, farro, brown or wild rice and quinoa.    Look for choices with 100% whole wheat, rye, oats or bran as the first ingredient listed    Check nutrition fact labels and choose products with 4gm of dietary fiber or more per serving   Add more beans to your diet   Eat more fresh fruits and vegetables with skins on them    Exercise- increase physical activity as it helps stimulate gastric mobility, which moves stool through the digestive tract     Setting SMART Goals for Weight Management Success:    Setting SMART goals is an effective way to enhance focus and commitment to weight management. By breaking down your goals into actionable steps, you increase the likelihood of achieving your desired outcomes and  "improving your overall health. Remember, consistency is key and small changes can lead to significant results over time.    Understanding SMART Goals:  SMART is an acronym that stands for:  -Specific: The goal should be clear and specific to help focus efforts.    - Measurable: Quantifying the goal allows tracking progress and maintaining motivation.     - Achievable: The goal should be realistic and attainable to encourage commitment.     - Relevant: It should align with their overall health and lifestyle objectives.     - Time-bound: Setting a deadline creates urgency and prompts action.     Steps to Set SMART goals for Weight Management:  1) Identify the areas for change:  - Discuss and identify what aspects of nutrition, physical activity or behavior you would like to improve.  2) Write down your goals:  - Use the SMART criteria to formulate each goal: Here are examples:   - Specific: \"I want to lose weight\" becomes \"I want to lose 10 lbs.\"     - Measurable: I will eat healthy becomes \" I will include 5 servings of fruits and vegetables in my diet daily\"     - Achievable: \"I will exercise more\" becomes \"I will walk for 30 minutes, 3 days a week\"     - Relevant: Ensure your goal aligns with your overall health objectives, like \"losing weight to improve blood pressure\"     Time-bound:  \"I want to lose weight someday\" becomes \"I will lose 10 lbs in 3 months\"     Tips for Success:  - Start small: begin with one or two goals and expand as you gain more confidence.  - Stay flexible: if you find certain goals are not working, don't hesitate to modify them to be more attainable.   - Track progress: keep a journal or use an josee to monitor progress. This helps you stay accountable and recognizes your accomplishments.   - Celebrate Milestones: acknowledge your achievements along the way, no matter how small they may seem.    "
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
## 2025-01-22 ENCOUNTER — TELEPHONE (OUTPATIENT)
Dept: SURGICAL ONCOLOGY | Facility: CLINIC | Age: 55
End: 2025-01-22

## 2025-01-22 NOTE — TELEPHONE ENCOUNTER
Spoke with pt regarding need to cancel appt 3/7/25 @ 8 AM because of change in provider schedule.  New appt is 3/7/25 8:30 AM with Marichuy

## 2025-02-17 ENCOUNTER — OFFICE VISIT (OUTPATIENT)
Dept: FAMILY MEDICINE CLINIC | Facility: CLINIC | Age: 55
End: 2025-02-17
Payer: COMMERCIAL

## 2025-02-17 VITALS
TEMPERATURE: 97.4 F | DIASTOLIC BLOOD PRESSURE: 74 MMHG | RESPIRATION RATE: 18 BRPM | HEART RATE: 68 BPM | SYSTOLIC BLOOD PRESSURE: 122 MMHG | HEIGHT: 66 IN | WEIGHT: 190.4 LBS | BODY MASS INDEX: 30.6 KG/M2

## 2025-02-17 DIAGNOSIS — J20.8 ACUTE BRONCHITIS DUE TO OTHER SPECIFIED ORGANISMS: Primary | ICD-10-CM

## 2025-02-17 DIAGNOSIS — N95.1 MENOPAUSAL VASOMOTOR SYNDROME: ICD-10-CM

## 2025-02-17 DIAGNOSIS — E66.9 OBESITY (BMI 30-39.9): ICD-10-CM

## 2025-02-17 PROCEDURE — 99214 OFFICE O/P EST MOD 30 MIN: CPT | Performed by: FAMILY MEDICINE

## 2025-02-17 RX ORDER — AZITHROMYCIN 250 MG/1
TABLET, FILM COATED ORAL
Qty: 6 TABLET | Refills: 0 | Status: SHIPPED | OUTPATIENT
Start: 2025-02-17 | End: 2025-02-22

## 2025-02-17 NOTE — PROGRESS NOTES
"Name: Leslee Stephenson      : 1970      MRN: [id number]  Encounter Provider: Maikel Bose DO  Encounter Date: 2025   Encounter department: [facility]  :  Assessment & Plan  Acute bronchitis due to other specified organisms  And otc  F/u if no better  Orders:    azithromycin (ZITHROMAX) 250 mg tablet; Take 500mg on day 1, 250mg on days 2-5    Obesity (BMI 30-39.9)  On meds from wt mgmt           Menopausal vasomotor syndrome  On paxil  States it helps  Caution with wt gain potential              History of Present Illness   HPI  Cough  Retiring next month  6d of sx  Robitussin DM  Moist cough at night  Yellow/brown phlegm  No sob  No fever    Review of Systems   Respiratory:  Positive for cough. Negative for shortness of breath and wheezing.    Cardiovascular:  Negative for chest pain.       Objective   /74   Pulse 68   Temp (!) 97.4 °F (36.3 °C)   Resp 18   Ht 5' 6\" (1.676 m)   Wt 86.4 kg (190 lb 6.4 oz)   BMI 30.73 kg/m²      Physical Exam  Vitals and nursing note reviewed.   Constitutional:       General: She is not in acute distress.     Appearance: She is well-developed. She is not ill-appearing.   HENT:      Head: Normocephalic.      Right Ear: Tympanic membrane normal.      Left Ear: Tympanic membrane normal.      Nose: Congestion present.   Eyes:      General: No scleral icterus.     Conjunctiva/sclera: Conjunctivae normal.   Cardiovascular:      Rate and Rhythm: Normal rate and regular rhythm.      Heart sounds: No murmur heard.  Pulmonary:      Effort: Pulmonary effort is normal. No respiratory distress.      Breath sounds: No wheezing.      Comments: Coarse midline cough present.    Abdominal:      General: There is no distension.      Palpations: Abdomen is soft.   Musculoskeletal:         General: No deformity.      Cervical back: Neck supple.   Skin:     General: Skin is warm and dry.      Coloration: Skin is not pale.   Neurological:      Mental Status: She is " alert.      Motor: No weakness.      Gait: Gait normal.   Psychiatric:         Mood and Affect: Mood normal.         Behavior: Behavior normal.         Thought Content: Thought content normal.

## 2025-02-19 ENCOUNTER — CLINICAL SUPPORT (OUTPATIENT)
Dept: BARIATRICS | Facility: CLINIC | Age: 55
End: 2025-02-19

## 2025-02-19 VITALS — HEIGHT: 66 IN | WEIGHT: 190.3 LBS | BODY MASS INDEX: 30.58 KG/M2

## 2025-02-19 DIAGNOSIS — R63.5 ABNORMAL WEIGHT GAIN: Primary | ICD-10-CM

## 2025-02-19 PROCEDURE — RECHECK

## 2025-02-19 NOTE — PROGRESS NOTES
"Weight Management Medical Nutrition Assessment  Pt is here today for menu planning. Current weight 190.8# which is a 9.0# weight loss since started (5% TBW).  Pt on Wegovy 1.0mg which is helping to better control her portions and snacking. Reviewed diet recall and she is likely not meeting her protein needs everyday. She ran out of the protein powder so is doing more eggs or raisin bran for breakfast.  She isn't snacking which is good, but it is also a loss opportunity to better meet protein needs. Suggested a protein bar, cottage cheese, greek yogurt, etc. She has been getting back to exercise 4 days per week and still working her way back up to where she was prior to knee sx.  She would like to start working on logging her intake more regularly to ensure she is meeting her protein needs. Pt has [facility] f/u appt in March therefore will f/u with RD in April.    Patient seen by Medical Provider in past 6 months:  yes  Requested to schedule appointment with Medical Provider: no, has appt on 3/7    Anthropometric Measurements  Start Weight with RD on 11/11/24 (#): 198.6#  Start wt with provider on 11/19/24:  199.8#  Current weight:  190.8#  TBW % Change from start weight: 5%  IBW: 130# (66\")  Goal Weight (#): 160-170#    Weight Loss History  Previous weight loss attempts: Commercial Programs (Weight Watchers, [employer], etc.)  Exercise  Self Created Diets (Portion Control, Healthy Food Choices, etc.)    Food and Nutrition Related History  Wake up: wakes 5-5:30   Bed Time: 10pm  Sleep quality: poor-possibly due to hot flashes and the knee pain that is waking.   Going to be retired as of March 14th--going to try to stay active.  Warned pt to be mindful of possible mindless snacking while at home.      Dietary Recall  Coffee with an oatmilk creamer  Breakfast:  1-2 eggs occ with cheese + 1 slices of toast (70cals) or on low carb wrap OR yogurt with berries + flax/hemp/granola OR raisin bran  Snack: -not hungry any more at " this time, occ Halo orange  Lunch: 11:30am-Salad w/homemade vinaigrette or balsalmic with 2-3oz grilled chicken or tuna or jorge a barely there breaded chicken  Snack: not often   Dinner: 6:15-7:30pm:  3oz chicken many different ways or steak/rushing broil/chicken +broccoli/asparagus/[place] sprouts + baked potato/3/4 cup starch OR pasta   Snack: maybe just a sf ice pop, but typically not hungry at this time.     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 4 days per week of strength training. Starting with light weights   Frequency: 4 days per week  Physical limitations/barriers to exercise: cardio is limited due to R knee pain going for MRI    Estimated Needs  [employer] Needs (needs at 198.6#)  BMR: 1518 kcal  Maintenance calories (sedentary): 1821 kcal  1-2#/week loss (sedentary): 821-1321 kcal  1-2#/week loss (light activity): 9759-3878 kcal    Protein: 71-88 grams (1.2-1.5g/kg IBW)  Fluid: 2068ml (35mL/kg IBW)    Nutrition Diagnosis  Yes;    Overweight/obesity  related to Excess energy intake as evidenced by  BMI more than normative standard for age and sex (obesity-grade I 30-34.9)       Nutrition Intervention    Nutrition Prescription  Calories: 1200 kcal  Protein: 75-85 g  Fluid: 2000 ml    Meal Plan (Franko/Pro)  Breakfast: 6am-150 franko protein shake   Snack:  200/20   Lunch: 300cals/21  Snack: 150  Dinner: 400cals/21g  Snack: --    Nutrition Education  Calorie controlled menu  Lean protein food choices  Healthy snack options  Food journaling  tips    Nutrition Counseling  Strategies: meal planning, portion sizes, healthy snack choices, hydration, fiber intake, protein intake, exercise, food logging    Monitoring and Evaluation:    Evaluation criteria  Energy Intake  Meet protein needs  Maintain adequate hydration  Monitor weekly weight  Meal planning/preparation  Food journal   Decreased portions at mealtimes and snacks  Physical activity     Barriers to change:none  Readiness to change: Action  Comprehension: good  Expected Compliance: good

## 2025-03-06 NOTE — PROGRESS NOTES
Name: Leslee Stephenson      : 1970      MRN: [id number]  Encounter Provider: YAA Singh  Encounter Date: 3/7/2025   Encounter department: [facility] [place]  :  Assessment & Plan  At high risk for breast cancer  Ms. Leslee Stephenson is a 55 y.o. female presenting today for 1 year follow up due to her high risk of breast cancer. Pt does not have a personal hx of cancer, however her family hx is notable for breast cancer in her mother (dx age 61), maternal aunt (dx age 85), paternal grandmother (dx age 75). Other cancers in the family include head and neck in her father, basal cell carcinoma in her sister, as well as colon cancer in 2 maternal cousins and a maternal aunt. Genetic testing was negative for pathogenic variants, (61 gene panel via [employer] in ). She continues enhanced breast cancer screening with annual mammogram and breast MRI, . Her last bilateral mammogram was on 2024, which demonstrated BI-RADS 1, category 3 density (Heterogeneously dense). Breast MRI last completed 2024, with recommendation for MRI-guided biopsy of the right breast secondary to a suspicious linear non-mass enhancement. Biopsy was completed 2025, with benign pathology without atypia.    Today I discussed breast cancer risk at length with Ms. Leslee Stephenson. Including the fact that breast cancer screening is largely dependent on lifetime risk of breast cancer. Tyrer-Cuzick (TC) risk score was calculated today, with results demonstrating a 41.1% lifetime risk of breast cancer (utilizing competing mortality). I advised Ms. Leslee Stephenson that she is considered high risk. With this, enhanced breast cancer screening is recommended per NCCN guidelines. This includes a clinical breast exam (CBE) every 6-12 months, self-breast awareness, as well as annual mammogram and annual breast MRI with and w/o contrast, ideally  by 6 months. In addition, pt may consider  risk reduction in the form of lifestyle modifications and/or chemoprophylaxis (Tamoxifen). Pt is s/p BSO, and has a fam hx of TNBC. With this, I have recommended to defer option for chemoprevention. I suspect her risk is slightly lower than TC due to absence of ovaries. We can consider running CANRISK at next visit together for further insight into her lifetime risk of breast cancer. Pt is agreeable to continuing with enhanced screening with MRI and mammogram. We briefly reviewed recommendations to reduce breast cancer risk via healthy lifestyle, including avoiding or limiting alcohol use, obtaining 150-300 minutes of weekly exercise, as well as weight control (BMI <25).    There were no concerning findings upon clinical breast exam (CBE) today. No dominant masses, nodularities, skin changes, enlarged lymph nodes or nipple discharge present. Pt is tender in right breast, 9:00 position, near the prior biopsy site. Discussed warm/cold compress for comfort. Script provided for 2025 mammogram and breast MRI. I encouraged pt to promptly call if any questions or concerns prior to next scheduled appmt. I will see the patient back in 1 year or sooner should the need arise. All questions were answered today.  Orders:  •  Mammo screening bilateral w 3d and cad; Future  •  MRI breast bilateral w and wo contrast w cad; Future    Family history of breast cancer  Orders:  •  Mammo screening bilateral w 3d and cad; Future  •  MRI breast bilateral w and wo contrast w cad; Future    Breast cancer screening by mammogram  Orders:  •  Mammo screening bilateral w 3d and cad; Future      History of Present Illness   Leslee Stephenson is a 55 y.o. year old female who presents today for 1 year follow up due to her high risk of breast cancer. .   Pt reports current post menopausal state, menarche age 13, and states she was 27 y/o at the time of her first live birth. She denies any known [employer] descent.. She reports she is s/p BSO, as  "well as use of HRT with oral estrogen X23 years, last utilized about 1 year ago. She brought in records from her mother's cancer treatment, which reports TNBC dx with left mastectomy and chemotherapy as treatment. Her mother did not have genetic testing. Her mother was disease free for 13 years, before ultimately passing of unrelated etiology. She reports her sister to also have negative genetic testing. She denies any breast changes, palpable concerns, or nipple discharge. She notes tenderness with palpation to the right breast at the 9 oclock position, near the biopsy site in Jan 2025.     Review of Systems   Constitutional:  Negative for activity change, appetite change, fatigue, fever and unexpected weight change.   Skin: Negative.    Neurological: Negative.    Psychiatric/Behavioral:  Negative for confusion.     A complete review of systems is negative other than that noted above in the HPI.       Objective   /80 (BP Location: Left arm, Patient Position: Sitting, Cuff Size: Large)   Pulse 67   Temp 97.8 °F (36.6 °C) (Temporal)   Ht 5' 6\" (1.676 m)   SpO2 99%   BMI 30.72 kg/m²       Physical Exam  Vitals and nursing note reviewed.   Constitutional:       Appearance: Normal appearance. She is normal weight.   Eyes:      Conjunctiva/sclera: Conjunctivae normal.   Chest:      Chest wall: No mass.   Breasts:     Right: Skin change (biopsy site) and tenderness present. No swelling, bleeding, inverted nipple, mass or nipple discharge.      Left: No swelling, bleeding, inverted nipple, mass, nipple discharge, skin change or tenderness.       Lymphadenopathy:      Upper Body:      Right upper body: No supraclavicular or axillary adenopathy.      Left upper body: No supraclavicular or axillary adenopathy.   Skin:     General: Skin is warm and dry.   Neurological:      Mental Status: She is alert. Mental status is at baseline.   Psychiatric:         Mood and Affect: Mood normal.         Behavior: Behavior " normal.         Thought Content: Thought content normal.         Judgment: Judgment normal.

## 2025-03-07 ENCOUNTER — OFFICE VISIT (OUTPATIENT)
Dept: SURGICAL ONCOLOGY | Facility: CLINIC | Age: 55
End: 2025-03-07
Payer: COMMERCIAL

## 2025-03-07 ENCOUNTER — OFFICE VISIT (OUTPATIENT)
Age: 55
End: 2025-03-07
Payer: COMMERCIAL

## 2025-03-07 VITALS
TEMPERATURE: 97.6 F | WEIGHT: 187.6 LBS | BODY MASS INDEX: 30.15 KG/M2 | HEART RATE: 78 BPM | DIASTOLIC BLOOD PRESSURE: 78 MMHG | OXYGEN SATURATION: 98 % | HEIGHT: 66 IN | SYSTOLIC BLOOD PRESSURE: 118 MMHG

## 2025-03-07 VITALS
SYSTOLIC BLOOD PRESSURE: 110 MMHG | HEART RATE: 67 BPM | OXYGEN SATURATION: 99 % | HEIGHT: 66 IN | TEMPERATURE: 97.8 F | DIASTOLIC BLOOD PRESSURE: 80 MMHG | BODY MASS INDEX: 30.72 KG/M2

## 2025-03-07 DIAGNOSIS — Z91.89 AT HIGH RISK FOR BREAST CANCER: Primary | ICD-10-CM

## 2025-03-07 DIAGNOSIS — Z80.3 FAMILY HISTORY OF BREAST CANCER: ICD-10-CM

## 2025-03-07 DIAGNOSIS — E66.811 OBESITY, CLASS I, BMI 30-34.9: Primary | ICD-10-CM

## 2025-03-07 DIAGNOSIS — Z12.31 BREAST CANCER SCREENING BY MAMMOGRAM: ICD-10-CM

## 2025-03-07 DIAGNOSIS — R73.01 IMPAIRED FASTING GLUCOSE: ICD-10-CM

## 2025-03-07 DIAGNOSIS — Z12.39 BREAST CANCER SCREENING OTHER THAN MAMMOGRAM: ICD-10-CM

## 2025-03-07 DIAGNOSIS — E78.5 HYPERLIPIDEMIA: ICD-10-CM

## 2025-03-07 PROCEDURE — 99214 OFFICE O/P EST MOD 30 MIN: CPT | Performed by: PHYSICIAN ASSISTANT

## 2025-03-07 PROCEDURE — 99213 OFFICE O/P EST LOW 20 MIN: CPT
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
## 2025-03-07 NOTE — ASSESSMENT & PLAN NOTE
Orders:  •  Mammo screening bilateral w 3d and cad; Future  •  MRI breast bilateral w and wo contrast w cad; Future

## 2025-03-26 ENCOUNTER — APPOINTMENT (OUTPATIENT)
Dept: LAB | Facility: HOSPITAL | Age: 55
End: 2025-03-26
Payer: COMMERCIAL

## 2025-03-26 DIAGNOSIS — E66.811 OBESITY, CLASS I, BMI 30-34.9: ICD-10-CM

## 2025-03-26 DIAGNOSIS — R73.01 IMPAIRED FASTING GLUCOSE: ICD-10-CM

## 2025-03-26 LAB
ALBUMIN SERPL BCG-MCNC: 4.5 G/DL (ref 3.5–5)
ALP SERPL-CCNC: 60 U/L (ref 34–104)
ALT SERPL W P-5'-P-CCNC: 14 U/L (ref 7–52)
ANION GAP SERPL CALCULATED.3IONS-SCNC: 10 MMOL/L (ref 4–13)
AST SERPL W P-5'-P-CCNC: 14 U/L (ref 13–39)
BILIRUB SERPL-MCNC: 0.71 MG/DL (ref 0.2–1)
BUN SERPL-MCNC: 21 MG/DL (ref 5–25)
CALCIUM SERPL-MCNC: 10 MG/DL (ref 8.4–10.2)
CHLORIDE SERPL-SCNC: 106 MMOL/L (ref 96–108)
CO2 SERPL-SCNC: 24 MMOL/L (ref 21–32)
CREAT SERPL-MCNC: 0.8 MG/DL (ref 0.6–1.3)
EST. AVERAGE GLUCOSE BLD GHB EST-MCNC: 108 MG/DL
GFR SERPL CREATININE-BSD FRML MDRD: 83 ML/MIN/1.73SQ M
GLUCOSE P FAST SERPL-MCNC: 88 MG/DL (ref 65–99)
HBA1C MFR BLD: 5.4 %
INSULIN SERPL-ACNC: 9.43 UIU/ML (ref 1.9–23)
POTASSIUM SERPL-SCNC: 4.1 MMOL/L (ref 3.5–5.3)
PROT SERPL-MCNC: 7.5 G/DL (ref 6.4–8.4)
SODIUM SERPL-SCNC: 140 MMOL/L (ref 135–147)

## 2025-03-26 PROCEDURE — 36415 COLL VENOUS BLD VENIPUNCTURE: CPT

## 2025-03-26 PROCEDURE — 80053 COMPREHEN METABOLIC PANEL: CPT

## 2025-03-26 PROCEDURE — 83525 ASSAY OF INSULIN: CPT

## 2025-03-26 PROCEDURE — 83036 HEMOGLOBIN GLYCOSYLATED A1C: CPT

## 2025-04-14 DIAGNOSIS — R73.01 IMPAIRED FASTING GLUCOSE: ICD-10-CM

## 2025-04-14 DIAGNOSIS — E78.5 HYPERLIPIDEMIA: ICD-10-CM

## 2025-04-14 DIAGNOSIS — E66.811 OBESITY, CLASS I, BMI 30-34.9: ICD-10-CM

## 2025-04-19 ENCOUNTER — OFFICE VISIT (OUTPATIENT)
Dept: URGENT CARE | Facility: CLINIC | Age: 55
End: 2025-04-19
Payer: COMMERCIAL

## 2025-04-19 VITALS
HEART RATE: 69 BPM | BODY MASS INDEX: 29.05 KG/M2 | TEMPERATURE: 97.7 F | WEIGHT: 180 LBS | DIASTOLIC BLOOD PRESSURE: 62 MMHG | SYSTOLIC BLOOD PRESSURE: 118 MMHG | OXYGEN SATURATION: 98 %

## 2025-04-19 DIAGNOSIS — L23.7 POISON IVY: Primary | ICD-10-CM

## 2025-04-19 PROCEDURE — 99213 OFFICE O/P EST LOW 20 MIN: CPT | Performed by: PHYSICIAN ASSISTANT

## 2025-04-19 RX ORDER — BETAMETHASONE DIPROPIONATE 0.5 MG/G
OINTMENT, AUGMENTED TOPICAL 2 TIMES DAILY
Qty: 30 G | Refills: 0 | Status: SHIPPED | OUTPATIENT
Start: 2025-04-19

## 2025-04-19 RX ORDER — PREDNISONE 10 MG/1
TABLET ORAL
Qty: 28 TABLET | Refills: 0 | Status: SHIPPED | OUTPATIENT
Start: 2025-04-19 | End: 2025-05-01

## 2025-04-19 NOTE — PATIENT INSTRUCTIONS
Patient Education     Poison [place], Poison [place], Poison Sumac Discharge Instructions   About this topic   Poison ivy, oak, and sumac are plants that may cause rashes on the skin. The plants can be found anywhere, including the woods or your yard. The rash is caused by the oily sap of the plants. It does not smell and it is clear so you probably can't tell that it is there. Sometimes, you get a rash by touching the plants. Other times, it is from touching something else, like clothes, pets, another person, or gardening tools that have touched a plant. Smoke from burning these plants can also cause a rash.  Not everyone who comes in contact with the sap will get a reaction, but most people will. A reaction may happen a few hours after you touch the sap or it may happen up to 5 days later. If the sap gets on your skin, it may become red, swollen, and very itchy. Blisters may also form. You can't catch a rash from someone else, but you can get it from someone if they transfer the sap to you. The rash may last 1 to 3 weeks.  What care is needed at home?   Ask your doctor what you need to do when you go home. Make sure you ask questions if you do not understand what the doctor says. This way you will know what you need to do.  If you have a rash:  Do not scratch or rub your skin. This can lead to infection or spread the rash.  Try putting a cool, wet cloth on your rash.  Creams and lotions, like hydrocortisone cream and Calamine lotion, may help itching and blistering.  Take a cool shower to help ease the itching.  Take a bath using lukewarm water. Hot water can make itching worse. Adding oatmeal bath products or baking soda may also help.  After bathing or showering, blot your body dry rather than rubbing to avoid spread of the rash.  What follow-up care is needed?   Your doctor may ask you to make visits to the office to check on your progress. Be sure to keep these visits.  What drugs may be needed?   If you have a very  bad rash, your doctor may give you drugs to help with swelling and itching.  What can be done to prevent this health problem?   Learn what the plants look like and stay away from them. Poison ivy and poison oak have three leaves on one stem. Poison sumac has 7 to 13 leaves that grow in pairs.  Wear long pants, long sleeves, and gloves if you must be around these plants.  Wash your hands with soap and water within 15 minutes if you have contact with the plants.  Scrub your fingernails carefully to prevent spreading to other parts of your body.  Take a shower if the plants have touched your arms, legs, or other body parts. Wash well with soap and water.  Wash clothing and shoes with soap and hot water. Wash anything else, like gardening tools that may have touched a plant.  Wash your pets to remove oil from the fur. Pets do not get this rash but you may get it from touching oil on their fur.  Do not burn these plants. This will spread the oil into the air. It can cause very bad problems with other people if the wind is blowing.  If you are prone to getting a reaction from these plants, you may want to consider using over-the-counter products that can help block the oil from getting into the skin.  When do I need to call the doctor?   Signs of infection. These include a fever of 100.4°F (38°C) or higher, chills, wound that will not heal.  Trouble breathing or swallowing  Swelling of the face and lips or swelling that makes your eyes puffy or partially shut  A rash that covers a large part of your body, or that is spreading quickly  Pain, swelling, warmth, pus around the rash  A rash in your eyes, mouth, or genital area  Very bad itching that doesn't get better or keeps you awake at night  You are not feeling better in 2 to 3 days or you are feeling worse  Teach Back: Helping You Understand   The Teach Back Method helps you understand the information we are giving you. After you talk with the staff, tell them in your  own words what you learned. This helps to make sure the staff has described each thing clearly. It also helps to explain things that may have been confusing. Before going home, make sure you can do these:  I can tell you about my condition.  I can tell you how to care for my rash.  I can tell you how I will take extra care to prevent this from happening in the future.  I can tell you what I will do if I have trouble breathing or swallowing, or if the rash covers a large part of my face or my body.  Last Reviewed Date   2021-11-15  Consumer Information Use and Disclaimer   This generalized information is a limited summary of diagnosis, treatment, and/or medication information. It is not meant to be comprehensive and should be used as a tool to help the user understand and/or assess potential diagnostic and treatment options. It does NOT include all information about conditions, treatments, medications, side effects, or risks that may apply to a specific patient. It is not intended to be medical advice or a substitute for the medical advice, diagnosis, or treatment of a health care provider based on the health care provider's examination and assessment of a patient’s specific and unique circumstances. Patients must speak with a health care provider for complete information about their health, medical questions, and treatment options, including any risks or benefits regarding use of medications. This information does not endorse any treatments or medications as safe, effective, or approved for treating a specific patient. [employer] and its affiliates disclaim any warranty or liability relating to this information or the use thereof. The use of this information is governed by the Terms of Use, available at https://www.woltersThirdMotionuwer.com/en/know/clinical-effectiveness-terms   Copyright   Copyright © 2024 [employer] and its affiliates and/or licensors. All rights reserved.
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
## 2025-05-07 ENCOUNTER — OFFICE VISIT (OUTPATIENT)
Age: 55
End: 2025-05-07
Payer: COMMERCIAL

## 2025-05-07 VITALS
RESPIRATION RATE: 16 BRPM | HEIGHT: 66 IN | BODY MASS INDEX: 28.38 KG/M2 | TEMPERATURE: 96.8 F | WEIGHT: 176.6 LBS | HEART RATE: 83 BPM | SYSTOLIC BLOOD PRESSURE: 120 MMHG | DIASTOLIC BLOOD PRESSURE: 80 MMHG

## 2025-05-07 DIAGNOSIS — R73.03 PREDIABETES: ICD-10-CM

## 2025-05-07 DIAGNOSIS — R73.01 IMPAIRED FASTING GLUCOSE: ICD-10-CM

## 2025-05-07 DIAGNOSIS — E78.5 HYPERLIPIDEMIA: ICD-10-CM

## 2025-05-07 DIAGNOSIS — E66.811 OBESITY, CLASS I, BMI 30-34.9: Primary | ICD-10-CM

## 2025-05-07 PROCEDURE — 99214 OFFICE O/P EST MOD 30 MIN: CPT | Performed by: PHYSICIAN ASSISTANT
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
## 2025-05-08 ENCOUNTER — TELEPHONE (OUTPATIENT)
Age: 55
End: 2025-05-08

## 2025-05-08 NOTE — TELEPHONE ENCOUNTER
PA for Wegovy SUBMITTED to [employer]    via    [x]CMM-KEY: [id number]  []Surescripts-Case ID #    []Availity-Auth ID #  NDC #    []Faxed to plan   []Other website    []Phone call Case ID #      []PA sent as URGENT    All office notes, labs and other pertaining documents and studies sent. Clinical questions answered. Awaiting determination from insurance company.     Turnaround time for your insurance to make a decision on your Prior Authorization can take 7-21 business days.

## 2025-05-09 NOTE — TELEPHONE ENCOUNTER
PA for Wegovy  APPROVED     Date(s) approved 4/8/2025-12/4/2025    Case #    Patient advised by          [][employer] Message  [x]Phone call   []LMOM  []L/M to call office as no active Communication consent on file  []Unable to leave detailed message as VM not approved on Communication consent       Pharmacy advised by    [x]Fax  []Phone call  []Secure Chat    Specialty Pharmacy    []     Approval letter scanned into [employer] - On covermymeds

## 2025-05-14 DIAGNOSIS — N95.1 MENOPAUSAL VASOMOTOR SYNDROME: ICD-10-CM

## 2025-05-14 RX ORDER — PAROXETINE 10 MG/1
10 TABLET, FILM COATED ORAL DAILY
Qty: 90 TABLET | Refills: 1 | Status: SHIPPED | OUTPATIENT
Start: 2025-05-14

## 2025-05-28 ENCOUNTER — OFFICE VISIT (OUTPATIENT)
Dept: FAMILY MEDICINE CLINIC | Facility: CLINIC | Age: 55
End: 2025-05-28
Payer: COMMERCIAL

## 2025-05-28 VITALS
BODY MASS INDEX: 28.09 KG/M2 | TEMPERATURE: 98.1 F | HEIGHT: 66 IN | SYSTOLIC BLOOD PRESSURE: 110 MMHG | RESPIRATION RATE: 16 BRPM | HEART RATE: 78 BPM | WEIGHT: 174.8 LBS | DIASTOLIC BLOOD PRESSURE: 80 MMHG

## 2025-05-28 DIAGNOSIS — J01.00 ACUTE NON-RECURRENT MAXILLARY SINUSITIS: Primary | ICD-10-CM

## 2025-05-28 DIAGNOSIS — H10.32 ACUTE CONJUNCTIVITIS OF LEFT EYE, UNSPECIFIED ACUTE CONJUNCTIVITIS TYPE: ICD-10-CM

## 2025-05-28 DIAGNOSIS — E66.9 OBESITY (BMI 30-39.9): ICD-10-CM

## 2025-05-28 PROCEDURE — 99214 OFFICE O/P EST MOD 30 MIN: CPT | Performed by: FAMILY MEDICINE

## 2025-05-28 RX ORDER — DOXYCYCLINE 100 MG/1
100 CAPSULE ORAL 2 TIMES DAILY
Qty: 20 CAPSULE | Refills: 0 | Status: SHIPPED | OUTPATIENT
Start: 2025-05-28 | End: 2025-06-07

## 2025-05-28 NOTE — PROGRESS NOTES
"Name: Leslee Stephenson      : 1970      MRN: [id number]  Encounter Provider: Maikel Bose DO  Encounter Date: 2025   Encounter department: [facility]  :  Assessment & Plan  Acute non-recurrent maxillary sinusitis  With left conjunctivitis    Orders:    doxycycline monohydrate (MONODOX) 100 mg capsule; Take 1 capsule (100 mg total) by mouth 2 (two) times a day for 10 days    Acute conjunctivitis of left eye, unspecified acute conjunctivitis type  Warm compresses       Obesity (BMI 30-39.9)    Wegovy per wt mgmt  Continue TLC efforts          History of Present Illness   Cough  This is a new problem. The current episode started in the past 7 days. The problem has been gradually worsening. The problem occurs every few minutes. The cough is Productive of brown sputum. Associated symptoms include headaches, nasal congestion, postnasal drip and rhinorrhea. Pertinent negatives include no chest pain, chills, ear congestion, ear pain, fever, heartburn, hemoptysis, myalgias, rash, sore throat, shortness of breath, sweats, weight loss or wheezing. The symptoms are aggravated by exercise and lying down.     Lost 25#  Cough  Allergies?  Thick mucus  Dark brown  No fever  Retired  Left eye irritation and dc yellow    On wegovy from [facility] wt mgmt  Tolerating w/o nausea  Might be on metformin in future for wt loss    Review of Systems   Constitutional:  Negative for chills, fever and weight loss.   HENT:  Positive for postnasal drip and rhinorrhea. Negative for ear pain and sore throat.    Respiratory:  Positive for cough. Negative for hemoptysis, shortness of breath and wheezing.    Cardiovascular:  Negative for chest pain.   Gastrointestinal:  Negative for heartburn.   Musculoskeletal:  Negative for myalgias.   Skin:  Negative for rash.   Neurological:  Positive for headaches.       Objective   /80   Pulse 78   Temp 98.1 °F (36.7 °C)   Resp 16   Ht 5' 6\" (1.676 m)   Wt 79.3 kg (174 lb 12.8 oz) "   BMI 28.21 kg/m²      Physical Exam  Vitals and nursing note reviewed.   Constitutional:       General: She is not in acute distress.     Appearance: She is well-developed. She is not ill-appearing.   HENT:      Head: Normocephalic.      Right Ear: Tympanic membrane normal.      Left Ear: Tympanic membrane normal.      Nose: Congestion present.     Eyes:      General: No scleral icterus.     Conjunctiva/sclera: Conjunctivae normal.      Comments: Left eye hyperemia     Cardiovascular:      Rate and Rhythm: Normal rate and regular rhythm.      Heart sounds: No murmur heard.  Pulmonary:      Effort: Pulmonary effort is normal. No respiratory distress.      Breath sounds: No wheezing.   Abdominal:      Palpations: Abdomen is soft.     Musculoskeletal:         General: No deformity.      Cervical back: Neck supple.     Skin:     General: Skin is warm and dry.      Coloration: Skin is not pale.     Neurological:      Mental Status: She is alert.      Motor: No weakness.      Gait: Gait normal.     Psychiatric:         Mood and Affect: Mood normal.         Behavior: Behavior normal.         Thought Content: Thought content normal.

## 2025-07-03 ENCOUNTER — HOSPITAL ENCOUNTER (OUTPATIENT)
Dept: RADIOLOGY | Facility: HOSPITAL | Age: 55
Discharge: HOME | End: 2025-07-03
Payer: COMMERCIAL

## 2025-07-03 DIAGNOSIS — Z80.3 FAMILY HISTORY OF BREAST CANCER: ICD-10-CM

## 2025-07-03 DIAGNOSIS — Z12.31 BREAST CANCER SCREENING BY MAMMOGRAM: ICD-10-CM

## 2025-07-03 DIAGNOSIS — Z91.89 AT HIGH RISK FOR BREAST CANCER: ICD-10-CM

## 2025-07-03 PROCEDURE — 77067 SCR MAMMO BI INCL CAD: CPT

## 2025-07-03 PROCEDURE — 77063 BREAST TOMOSYNTHESIS BI: CPT

## 2025-08-04 ENCOUNTER — OFFICE VISIT (OUTPATIENT)
Age: 55
End: 2025-08-04
Payer: COMMERCIAL

## 2025-08-04 VITALS
BODY MASS INDEX: 27.93 KG/M2 | OXYGEN SATURATION: 99 % | HEIGHT: 66 IN | DIASTOLIC BLOOD PRESSURE: 80 MMHG | SYSTOLIC BLOOD PRESSURE: 118 MMHG | HEART RATE: 85 BPM | WEIGHT: 173.8 LBS

## 2025-08-04 DIAGNOSIS — R73.03 PREDIABETES: ICD-10-CM

## 2025-08-04 DIAGNOSIS — E78.5 HYPERLIPIDEMIA, UNSPECIFIED HYPERLIPIDEMIA TYPE: ICD-10-CM

## 2025-08-04 DIAGNOSIS — R73.01 IMPAIRED FASTING GLUCOSE: ICD-10-CM

## 2025-08-04 DIAGNOSIS — E66.811 OBESITY, CLASS I, BMI 30-34.9: Primary | ICD-10-CM

## 2025-08-04 PROCEDURE — 99214 OFFICE O/P EST MOD 30 MIN: CPT | Performed by: PHYSICIAN ASSISTANT

## 2025-08-12 ENCOUNTER — OFFICE VISIT (OUTPATIENT)
Age: 55
End: 2025-08-12
Payer: COMMERCIAL

## 2025-08-14 ENCOUNTER — TELEPHONE (OUTPATIENT)
Age: 55
End: 2025-08-14

## 2025-08-22 ENCOUNTER — HOSPITAL ENCOUNTER (OUTPATIENT)
Dept: RADIOLOGY | Facility: HOSPITAL | Age: 55
Discharge: HOME | End: 2025-08-22
Payer: COMMERCIAL

## 2025-08-22 VITALS — BODY MASS INDEX: 27.64 KG/M2 | WEIGHT: 172 LBS | HEIGHT: 66 IN

## 2025-08-22 DIAGNOSIS — R92.8 ABNORMAL FINDING ON BREAST CANCER SCREENING: ICD-10-CM

## 2025-08-22 DIAGNOSIS — Z80.3 FAMILY HISTORY OF BREAST CANCER: ICD-10-CM

## 2025-08-22 DIAGNOSIS — Z91.89 AT HIGH RISK FOR BREAST CANCER: ICD-10-CM

## 2025-08-22 PROCEDURE — 77065 DX MAMMO INCL CAD UNI: CPT

## 2025-08-22 PROCEDURE — G0279 TOMOSYNTHESIS, MAMMO: HCPCS

## (undated) DEVICE — TRAY FOLEY 16FR URIMETER SURESTEP

## (undated) DEVICE — GLOVE SRG BIOGEL ECLIPSE 7

## (undated) DEVICE — SUT MONOCRYL 4-0 PS-2 18 IN Y496G

## (undated) DEVICE — 3M™ STERI-DRAPE™ U-DRAPE 1015: Brand: STERI-DRAPE™

## (undated) DEVICE — CHLORAPREP HI-LITE 26ML ORANGE

## (undated) DEVICE — SPONGE LAP 18 X 18 IN

## (undated) DEVICE — TUBING ARTHROSCOPIC WAVE  MAIN PUMP

## (undated) DEVICE — GAUZE SPONGES,16 PLY: Brand: CURITY

## (undated) DEVICE — INTENDED FOR TISSUE SEPARATION, AND OTHER PROCEDURES THAT REQUIRE A SHARP SURGICAL BLADE TO PUNCTURE OR CUT.: Brand: BARD-PARKER ® CARBON RIB-BACK BLADES

## (undated) DEVICE — ABDOMINAL PAD: Brand: DERMACEA

## (undated) DEVICE — PADDING CAST 4 IN  COTTON STRL

## (undated) DEVICE — MAT ABSORBANT ARTHROSCOPY FLOOR 46 X 40 IN

## (undated) DEVICE — FABRIC REINFORCED, SURGICAL GOWN, XL: Brand: CONVERTORS

## (undated) DEVICE — DECANTER: Brand: UNBRANDED

## (undated) DEVICE — SUT SILK 2-0 TIES 144 IN LA55G

## (undated) DEVICE — DRAPE EQUIPMENT RF WAND

## (undated) DEVICE — ACE WRAP 6 IN STERILE

## (undated) DEVICE — STRAP LITHOTOMY CANDY CANE

## (undated) DEVICE — BLADE SHAVER TORPEDO 4MM 13CM  COOLCUT

## (undated) DEVICE — CURITY IDOFORM PACKING STRIP: Brand: CURITY

## (undated) DEVICE — TUBING SUCTION 5MM X 12 FT

## (undated) DEVICE — VIOLET BRAIDED (POLYGLACTIN 910), SYNTHETIC ABSORBABLE SUTURE: Brand: COATED VICRYL

## (undated) DEVICE — GLOVE SRG BIOGEL 8

## (undated) DEVICE — BETHLEHEM UNIVERSAL MINOR GEN: Brand: CARDINAL HEALTH

## (undated) DEVICE — U-DRAPE: Brand: CONVERTORS

## (undated) DEVICE — GLOVE INDICATOR PI UNDERGLOVE SZ 8 BLUE

## (undated) DEVICE — MEDI-VAC YANK SUCT HNDL W/TPRD BULBOUS TIP: Brand: CARDINAL HEALTH

## (undated) DEVICE — LIGHT GLOVE GREEN

## (undated) DEVICE — BLADE SHAVER TORPEDO CRV 4MM 13MM COOLCUT

## (undated) DEVICE — NEEDLE HYPO 22G X 1-1/2 IN

## (undated) DEVICE — PROXIMATE LINEAR CUTTER RELOAD, BLUE, 75MM: Brand: PROXIMATE

## (undated) DEVICE — GLOVE SRG BIOGEL 6.5

## (undated) DEVICE — GLOVE INDICATOR PI UNDERGLOVE SZ 6.5 BLUE

## (undated) DEVICE — ARTHROSCOPY FLOOR MAT

## (undated) DEVICE — CURITY NON-ADHERENT STRIPS: Brand: CURITY

## (undated) DEVICE — SUT VICRYL 2-0 REEL 54 IN J286G

## (undated) DEVICE — SUT VICRYL 3-0 SH 27 IN J416H

## (undated) DEVICE — HEMOCLIP CARTRIDGE MED

## (undated) DEVICE — SPONGE GAUZE 4 X 9

## (undated) DEVICE — TOWEL SURG XR DETECT GREEN STRL RFD

## (undated) DEVICE — INTENDED FOR TISSUE SEPARATION, AND OTHER PROCEDURES THAT REQUIRE A SHARP SURGICAL BLADE TO PUNCTURE OR CUT.: Brand: BARD-PARKER SAFETY BLADES SIZE 15, STERILE

## (undated) DEVICE — DUAL SPIKE ADAPTER: Brand: CONMED

## (undated) DEVICE — TOWEL SET X-RAY

## (undated) DEVICE — ANTI-EMBOLISM STOCKINGS,KNEE LENGTH,X-LARGE,REGULAR,SIZE G-: Brand: T.E.D.

## (undated) DEVICE — CAST PADDING 4 IN SYNTHETIC NON-STRL

## (undated) DEVICE — GLOVE SRG BIOGEL ORTHOPEDIC 7.5

## (undated) DEVICE — SUT SILK 3-0 SH 30 IN K832H

## (undated) DEVICE — BULB SYRINGE,IRRIGATION WITH PROTECTIVE CAP: Brand: DOVER

## (undated) DEVICE — PROBE ABLATION  APOLLO RF 50 DEG MULTI PORT

## (undated) DEVICE — ACE WRAP 6 IN UNSTERILE

## (undated) DEVICE — DRAPE LAPAROTOMY W/POUCHES

## (undated) DEVICE — PROXIMATE RELOADABLE LINEAR CUTTER WITH SAFETY LOCK-OUT, 75MM: Brand: PROXIMATE

## (undated) DEVICE — ANTI-EMBOLISM STOCKINGS,KNEE LENGTH,LARGE,REGULAR,SIZE E-: Brand: T.E.D.

## (undated) DEVICE — 3M™ IOBAN™ 2 ANTIMICROBIAL INCISE DRAPE 6640EZ: Brand: IOBAN™ 2

## (undated) DEVICE — SPONGE LAP 18 X 18 IN STRL RFD

## (undated) DEVICE — ADHESIVE SKIN HIGH VISCOSITY EXOFIN 1ML

## (undated) DEVICE — BANDAGE, ESMARK LF STR 6"X9' (20/CS): Brand: CYPRESS

## (undated) DEVICE — PACK ARTHROSCOPY

## (undated) DEVICE — PENCIL ELECTROSURG E-Z CLEAN -0035H

## (undated) DEVICE — ASTOUND SURGICAL GOWN, XXX LARGE, X-LONG: Brand: CONVERTORS

## (undated) DEVICE — TIBURON EXTREMITY SHEET: Brand: CONVERTORS

## (undated) DEVICE — ACE WRAP 6 IN XL STERILE

## (undated) DEVICE — 1820 FOAM BLOCK NEEDLE COUNTER: Brand: DEVON

## (undated) DEVICE — 3000CC GUARDIAN II: Brand: GUARDIAN

## (undated) DEVICE — PLUMEPEN PRO 10FT

## (undated) DEVICE — 1016 S-DRAPE IRRIG POUCH 10/BOX: Brand: STERI-DRAPE™

## (undated) DEVICE — PAD GROUNDING ADULT

## (undated) DEVICE — 3M™ IOBAN™ 2 ANTIMICROBIAL INCISE DRAPE 6650EZ: Brand: IOBAN™ 2

## (undated) DEVICE — POUCH URO CATCHER II STERILE

## (undated) DEVICE — BETHLEHEM MAJOR GENERAL PACK: Brand: CARDINAL HEALTH

## (undated) DEVICE — SPONGE GAUZE 4 X 8 12 PLY STRL LF

## (undated) DEVICE — ELECTRODE BLADE MOD E-Z CLEAN 2.5IN 6.4CM -0012M

## (undated) DEVICE — INTENDED FOR TISSUE SEPARATION, AND OTHER PROCEDURES THAT REQUIRE A SHARP SURGICAL BLADE TO PUNCTURE OR CUT.: Brand: BARD-PARKER SAFETY BLADES SIZE 10, STERILE

## (undated) DEVICE — NON-STERILE REUSABLE TOURNIQUET CUFF SINGLE BLADDER, DUAL PORT AND QUICK CONNECT CONNECTOR: Brand: COLOR CUFF